# Patient Record
Sex: MALE | Race: WHITE | NOT HISPANIC OR LATINO | URBAN - METROPOLITAN AREA
[De-identification: names, ages, dates, MRNs, and addresses within clinical notes are randomized per-mention and may not be internally consistent; named-entity substitution may affect disease eponyms.]

---

## 2017-02-27 ENCOUNTER — FOLLOW UP ESTABLISHED (OUTPATIENT)
Dept: URBAN - METROPOLITAN AREA CLINIC 85 | Facility: CLINIC | Age: 77
End: 2017-02-27
Payer: MEDICARE

## 2017-02-27 DIAGNOSIS — H26.493 OTHER SECONDARY CATARACT, BILATERAL: ICD-10-CM

## 2017-02-27 DIAGNOSIS — E11.3293 DIABETES MELLITUS TYPE 2 WITH MILD NON-PROLIFERATI: Primary | ICD-10-CM

## 2017-02-27 DIAGNOSIS — Z79.4 LONG TERM (CURRENT) USE OF INSULIN: ICD-10-CM

## 2017-02-27 DIAGNOSIS — H35.372 PUCKERING OF MACULA, LEFT EYE: ICD-10-CM

## 2017-02-27 PROCEDURE — 92014 COMPRE OPH EXAM EST PT 1/>: CPT | Performed by: OPTOMETRIST

## 2017-02-27 ASSESSMENT — INTRAOCULAR PRESSURE
OS: 13
OD: 13

## 2017-02-27 ASSESSMENT — VISUAL ACUITY
OD: 20/25
OS: 20/40

## 2019-01-16 ENCOUNTER — APPOINTMENT (OUTPATIENT)
Dept: URGENT CARE | Facility: PHYSICIAN GROUP | Age: 79
End: 2019-01-16
Payer: MEDICARE

## 2019-01-17 ENCOUNTER — OFFICE VISIT (OUTPATIENT)
Dept: URGENT CARE | Facility: PHYSICIAN GROUP | Age: 79
End: 2019-01-17
Payer: MEDICARE

## 2019-01-17 VITALS
BODY MASS INDEX: 27.2 KG/M2 | WEIGHT: 190 LBS | HEIGHT: 70 IN | TEMPERATURE: 98.3 F | OXYGEN SATURATION: 98 % | HEART RATE: 96 BPM | SYSTOLIC BLOOD PRESSURE: 130 MMHG | RESPIRATION RATE: 16 BRPM | DIASTOLIC BLOOD PRESSURE: 80 MMHG

## 2019-01-17 DIAGNOSIS — B37.9 CANDIDA INFECTION: ICD-10-CM

## 2019-01-17 DIAGNOSIS — L30.4 INTERTRIGO: ICD-10-CM

## 2019-01-17 DIAGNOSIS — N48.1 BALANITIS: ICD-10-CM

## 2019-01-18 NOTE — PROGRESS NOTES
Willard through the physical exam patient decided that he would rather be seen in a different facility by a male doctor.  I did examine him enough to determine that he had balanitis and intertrigo.  Patient states that he wants to forego treatment from me and wait until he can see a male doctor.  Advised patient that if he should do that he should seek care sooner rather than later.

## 2019-03-20 ENCOUNTER — HOSPITAL ENCOUNTER (OUTPATIENT)
Facility: MEDICAL CENTER | Age: 79
End: 2019-03-21
Attending: EMERGENCY MEDICINE | Admitting: HOSPITALIST
Payer: MEDICARE

## 2019-03-20 DIAGNOSIS — T83.9XXA PROBLEM WITH FOLEY CATHETER, INITIAL ENCOUNTER (HCC): ICD-10-CM

## 2019-03-20 LAB
APPEARANCE UR: ABNORMAL
BACTERIA #/AREA URNS HPF: ABNORMAL /HPF
BASOPHILS # BLD AUTO: 0.8 % (ref 0–1.8)
BASOPHILS # BLD: 0.09 K/UL (ref 0–0.12)
BILIRUB UR QL STRIP.AUTO: NEGATIVE
COLOR UR: YELLOW
EOSINOPHIL # BLD AUTO: 0.3 K/UL (ref 0–0.51)
EOSINOPHIL NFR BLD: 2.7 % (ref 0–6.9)
EPI CELLS #/AREA URNS HPF: NEGATIVE /HPF
ERYTHROCYTE [DISTWIDTH] IN BLOOD BY AUTOMATED COUNT: 39.8 FL (ref 35.9–50)
GLUCOSE UR STRIP.AUTO-MCNC: >=1000 MG/DL
HCT VFR BLD AUTO: 42.8 % (ref 42–52)
HGB BLD-MCNC: 14.3 G/DL (ref 14–18)
HYALINE CASTS #/AREA URNS LPF: ABNORMAL /LPF
IMM GRANULOCYTES # BLD AUTO: 0.07 K/UL (ref 0–0.11)
IMM GRANULOCYTES NFR BLD AUTO: 0.6 % (ref 0–0.9)
INR PPP: 1.07 (ref 0.87–1.13)
KETONES UR STRIP.AUTO-MCNC: 15 MG/DL
LEUKOCYTE ESTERASE UR QL STRIP.AUTO: ABNORMAL
LYMPHOCYTES # BLD AUTO: 1.52 K/UL (ref 1–4.8)
LYMPHOCYTES NFR BLD: 13.6 % (ref 22–41)
MCH RBC QN AUTO: 29.8 PG (ref 27–33)
MCHC RBC AUTO-ENTMCNC: 33.4 G/DL (ref 33.7–35.3)
MCV RBC AUTO: 89.2 FL (ref 81.4–97.8)
MICRO URNS: ABNORMAL
MONOCYTES # BLD AUTO: 0.72 K/UL (ref 0–0.85)
MONOCYTES NFR BLD AUTO: 6.5 % (ref 0–13.4)
NEUTROPHILS # BLD AUTO: 8.44 K/UL (ref 1.82–7.42)
NEUTROPHILS NFR BLD: 75.8 % (ref 44–72)
NITRITE UR QL STRIP.AUTO: NEGATIVE
NRBC # BLD AUTO: 0 K/UL
NRBC BLD-RTO: 0 /100 WBC
PH UR STRIP.AUTO: 5.5 [PH]
PLATELET # BLD AUTO: 269 K/UL (ref 164–446)
PMV BLD AUTO: 10.9 FL (ref 9–12.9)
PROT UR QL STRIP: 30 MG/DL
PROTHROMBIN TIME: 14 SEC (ref 12–14.6)
RBC # BLD AUTO: 4.8 M/UL (ref 4.7–6.1)
RBC # URNS HPF: >150 /HPF
RBC UR QL AUTO: ABNORMAL
SP GR UR STRIP.AUTO: 1.03
UROBILINOGEN UR STRIP.AUTO-MCNC: 0.2 MG/DL
WBC # BLD AUTO: 11.1 K/UL (ref 4.8–10.8)
WBC #/AREA URNS HPF: ABNORMAL /HPF

## 2019-03-20 PROCEDURE — 85610 PROTHROMBIN TIME: CPT

## 2019-03-20 PROCEDURE — 700105 HCHG RX REV CODE 258: Performed by: EMERGENCY MEDICINE

## 2019-03-20 PROCEDURE — 96372 THER/PROPH/DIAG INJ SC/IM: CPT | Mod: XU

## 2019-03-20 PROCEDURE — 87186 SC STD MICRODIL/AGAR DIL: CPT

## 2019-03-20 PROCEDURE — 303105 HCHG CATHETER EXTRA

## 2019-03-20 PROCEDURE — 87077 CULTURE AEROBIC IDENTIFY: CPT

## 2019-03-20 PROCEDURE — 87086 URINE CULTURE/COLONY COUNT: CPT

## 2019-03-20 PROCEDURE — 99285 EMERGENCY DEPT VISIT HI MDM: CPT

## 2019-03-20 PROCEDURE — 81001 URINALYSIS AUTO W/SCOPE: CPT

## 2019-03-20 PROCEDURE — 80053 COMPREHEN METABOLIC PANEL: CPT

## 2019-03-20 PROCEDURE — 51702 INSERT TEMP BLADDER CATH: CPT

## 2019-03-20 PROCEDURE — 302140 GU CART: Performed by: EMERGENCY MEDICINE

## 2019-03-20 PROCEDURE — 85025 COMPLETE CBC W/AUTO DIFF WBC: CPT

## 2019-03-20 PROCEDURE — 84153 ASSAY OF PSA TOTAL: CPT

## 2019-03-20 RX ORDER — SODIUM CHLORIDE 9 MG/ML
1000 INJECTION, SOLUTION INTRAVENOUS ONCE
Status: COMPLETED | OUTPATIENT
Start: 2019-03-20 | End: 2019-03-21

## 2019-03-20 RX ADMIN — SODIUM CHLORIDE 1000 ML: 9 INJECTION, SOLUTION INTRAVENOUS at 23:20

## 2019-03-21 ENCOUNTER — PATIENT OUTREACH (OUTPATIENT)
Dept: HEALTH INFORMATION MANAGEMENT | Facility: OTHER | Age: 79
End: 2019-03-21

## 2019-03-21 VITALS
WEIGHT: 175.27 LBS | BODY MASS INDEX: 25.09 KG/M2 | SYSTOLIC BLOOD PRESSURE: 167 MMHG | DIASTOLIC BLOOD PRESSURE: 86 MMHG | OXYGEN SATURATION: 95 % | HEIGHT: 70 IN | TEMPERATURE: 98.6 F | RESPIRATION RATE: 16 BRPM | HEART RATE: 88 BPM

## 2019-03-21 PROBLEM — R33.9 URINARY RETENTION: Status: ACTIVE | Noted: 2019-03-21

## 2019-03-21 PROBLEM — N40.0 BPH (BENIGN PROSTATIC HYPERPLASIA): Status: ACTIVE | Noted: 2019-03-21

## 2019-03-21 PROBLEM — E11.65 TYPE 2 DIABETES MELLITUS WITH HYPERGLYCEMIA, WITH LONG-TERM CURRENT USE OF INSULIN (HCC): Status: ACTIVE | Noted: 2019-03-21

## 2019-03-21 PROBLEM — Z79.4 TYPE 2 DIABETES MELLITUS WITH HYPERGLYCEMIA, WITH LONG-TERM CURRENT USE OF INSULIN (HCC): Status: ACTIVE | Noted: 2019-03-21

## 2019-03-21 LAB
ALBUMIN SERPL BCP-MCNC: 4.3 G/DL (ref 3.2–4.9)
ALBUMIN/GLOB SERPL: 1.5 G/DL
ALP SERPL-CCNC: 79 U/L (ref 30–99)
ALT SERPL-CCNC: 9 U/L (ref 2–50)
ANION GAP SERPL CALC-SCNC: 14 MMOL/L (ref 0–11.9)
AST SERPL-CCNC: 17 U/L (ref 12–45)
BILIRUB SERPL-MCNC: 0.7 MG/DL (ref 0.1–1.5)
BUN SERPL-MCNC: 15 MG/DL (ref 8–22)
CALCIUM SERPL-MCNC: 9.5 MG/DL (ref 8.5–10.5)
CHLORIDE SERPL-SCNC: 99 MMOL/L (ref 96–112)
CO2 SERPL-SCNC: 20 MMOL/L (ref 20–33)
CREAT SERPL-MCNC: 0.86 MG/DL (ref 0.5–1.4)
GLOBULIN SER CALC-MCNC: 2.8 G/DL (ref 1.9–3.5)
GLUCOSE BLD-MCNC: 216 MG/DL (ref 65–99)
GLUCOSE BLD-MCNC: 316 MG/DL (ref 65–99)
GLUCOSE BLD-MCNC: 348 MG/DL (ref 65–99)
GLUCOSE SERPL-MCNC: 395 MG/DL (ref 65–99)
POTASSIUM SERPL-SCNC: 4.1 MMOL/L (ref 3.6–5.5)
PROT SERPL-MCNC: 7.1 G/DL (ref 6–8.2)
PSA SERPL-MCNC: 23.93 NG/ML (ref 0–4)
SODIUM SERPL-SCNC: 133 MMOL/L (ref 135–145)

## 2019-03-21 PROCEDURE — 306015 LOCK STAT FOLEY: Performed by: HOSPITALIST

## 2019-03-21 PROCEDURE — 700105 HCHG RX REV CODE 258: Performed by: EMERGENCY MEDICINE

## 2019-03-21 PROCEDURE — G0378 HOSPITAL OBSERVATION PER HR: HCPCS

## 2019-03-21 PROCEDURE — 82962 GLUCOSE BLOOD TEST: CPT

## 2019-03-21 PROCEDURE — 700105 HCHG RX REV CODE 258: Performed by: HOSPITALIST

## 2019-03-21 PROCEDURE — 99235 HOSP IP/OBS SAME DATE MOD 70: CPT | Performed by: FAMILY MEDICINE

## 2019-03-21 PROCEDURE — 96365 THER/PROPH/DIAG IV INF INIT: CPT

## 2019-03-21 PROCEDURE — A9270 NON-COVERED ITEM OR SERVICE: HCPCS | Performed by: EMERGENCY MEDICINE

## 2019-03-21 PROCEDURE — 96372 THER/PROPH/DIAG INJ SC/IM: CPT

## 2019-03-21 PROCEDURE — 700102 HCHG RX REV CODE 250 W/ 637 OVERRIDE(OP): Performed by: HOSPITALIST

## 2019-03-21 PROCEDURE — 96372 THER/PROPH/DIAG INJ SC/IM: CPT | Mod: XU

## 2019-03-21 PROCEDURE — 700111 HCHG RX REV CODE 636 W/ 250 OVERRIDE (IP): Performed by: EMERGENCY MEDICINE

## 2019-03-21 PROCEDURE — 700102 HCHG RX REV CODE 250 W/ 637 OVERRIDE(OP): Performed by: EMERGENCY MEDICINE

## 2019-03-21 RX ORDER — OXYCODONE HYDROCHLORIDE 5 MG/1
2.5 TABLET ORAL
Status: DISCONTINUED | OUTPATIENT
Start: 2019-03-21 | End: 2019-03-21 | Stop reason: HOSPADM

## 2019-03-21 RX ORDER — AMOXICILLIN 250 MG
2 CAPSULE ORAL 2 TIMES DAILY
Status: DISCONTINUED | OUTPATIENT
Start: 2019-03-21 | End: 2019-03-21 | Stop reason: HOSPADM

## 2019-03-21 RX ORDER — OXYBUTYNIN CHLORIDE 10 MG/1
10 TABLET, EXTENDED RELEASE ORAL EVERY EVENING
Status: DISCONTINUED | OUTPATIENT
Start: 2019-03-21 | End: 2019-03-21 | Stop reason: HOSPADM

## 2019-03-21 RX ORDER — DEXTROSE MONOHYDRATE 25 G/50ML
25 INJECTION, SOLUTION INTRAVENOUS
Status: DISCONTINUED | OUTPATIENT
Start: 2019-03-21 | End: 2019-03-21 | Stop reason: HOSPADM

## 2019-03-21 RX ORDER — OXYCODONE HYDROCHLORIDE 5 MG/1
5 TABLET ORAL
Status: DISCONTINUED | OUTPATIENT
Start: 2019-03-21 | End: 2019-03-21 | Stop reason: HOSPADM

## 2019-03-21 RX ORDER — OXYCODONE AND ACETAMINOPHEN 10; 325 MG/1; MG/1
1 TABLET ORAL ONCE
Status: COMPLETED | OUTPATIENT
Start: 2019-03-21 | End: 2019-03-21

## 2019-03-21 RX ORDER — BISACODYL 10 MG
10 SUPPOSITORY, RECTAL RECTAL
Status: DISCONTINUED | OUTPATIENT
Start: 2019-03-21 | End: 2019-03-21 | Stop reason: HOSPADM

## 2019-03-21 RX ORDER — OXYBUTYNIN CHLORIDE 10 MG/1
10 TABLET, EXTENDED RELEASE ORAL EVERY EVENING
Qty: 30 TAB | Refills: 1 | Status: ON HOLD | OUTPATIENT
Start: 2019-03-21 | End: 2019-04-09

## 2019-03-21 RX ORDER — ONDANSETRON 2 MG/ML
4 INJECTION INTRAMUSCULAR; INTRAVENOUS EVERY 4 HOURS PRN
Status: DISCONTINUED | OUTPATIENT
Start: 2019-03-21 | End: 2019-03-21 | Stop reason: HOSPADM

## 2019-03-21 RX ORDER — POLYETHYLENE GLYCOL 3350 17 G/17G
1 POWDER, FOR SOLUTION ORAL
Status: DISCONTINUED | OUTPATIENT
Start: 2019-03-21 | End: 2019-03-21 | Stop reason: HOSPADM

## 2019-03-21 RX ORDER — ONDANSETRON 4 MG/1
4 TABLET, ORALLY DISINTEGRATING ORAL EVERY 4 HOURS PRN
Status: DISCONTINUED | OUTPATIENT
Start: 2019-03-21 | End: 2019-03-21 | Stop reason: HOSPADM

## 2019-03-21 RX ORDER — ACETAMINOPHEN 325 MG/1
650 TABLET ORAL EVERY 6 HOURS PRN
Status: DISCONTINUED | OUTPATIENT
Start: 2019-03-21 | End: 2019-03-21 | Stop reason: HOSPADM

## 2019-03-21 RX ORDER — MORPHINE SULFATE 4 MG/ML
2 INJECTION, SOLUTION INTRAMUSCULAR; INTRAVENOUS
Status: DISCONTINUED | OUTPATIENT
Start: 2019-03-21 | End: 2019-03-21 | Stop reason: HOSPADM

## 2019-03-21 RX ORDER — SODIUM CHLORIDE 9 MG/ML
INJECTION, SOLUTION INTRAVENOUS CONTINUOUS
Status: DISCONTINUED | OUTPATIENT
Start: 2019-03-21 | End: 2019-03-21

## 2019-03-21 RX ADMIN — INSULIN HUMAN 4 UNITS: 100 INJECTION, SOLUTION PARENTERAL at 02:28

## 2019-03-21 RX ADMIN — SODIUM CHLORIDE: 9 INJECTION, SOLUTION INTRAVENOUS at 03:00

## 2019-03-21 RX ADMIN — INSULIN HUMAN 2 UNITS: 100 INJECTION, SOLUTION PARENTERAL at 06:32

## 2019-03-21 RX ADMIN — INSULIN HUMAN 4 UNITS: 100 INJECTION, SOLUTION PARENTERAL at 13:44

## 2019-03-21 RX ADMIN — CEFTRIAXONE 2 G: 2 INJECTION, POWDER, FOR SOLUTION INTRAMUSCULAR; INTRAVENOUS at 00:25

## 2019-03-21 RX ADMIN — OXYCODONE HYDROCHLORIDE AND ACETAMINOPHEN 1 TABLET: 10; 325 TABLET ORAL at 02:25

## 2019-03-21 ASSESSMENT — ENCOUNTER SYMPTOMS
PSYCHIATRIC NEGATIVE: 1
ABDOMINAL PAIN: 1
RESPIRATORY NEGATIVE: 1
EYES NEGATIVE: 1
CARDIOVASCULAR NEGATIVE: 1
NEUROLOGICAL NEGATIVE: 1
CONSTITUTIONAL NEGATIVE: 1
MUSCULOSKELETAL NEGATIVE: 1

## 2019-03-21 ASSESSMENT — PATIENT HEALTH QUESTIONNAIRE - PHQ9
1. LITTLE INTEREST OR PLEASURE IN DOING THINGS: NOT AT ALL
SUM OF ALL RESPONSES TO PHQ9 QUESTIONS 1 AND 2: 0
SUM OF ALL RESPONSES TO PHQ9 QUESTIONS 1 AND 2: 0
1. LITTLE INTEREST OR PLEASURE IN DOING THINGS: NOT AT ALL
2. FEELING DOWN, DEPRESSED, IRRITABLE, OR HOPELESS: NOT AT ALL
2. FEELING DOWN, DEPRESSED, IRRITABLE, OR HOPELESS: NOT AT ALL

## 2019-03-21 ASSESSMENT — LIFESTYLE VARIABLES
EVER_SMOKED: YES
ALCOHOL_USE: NO
DO YOU DRINK ALCOHOL: NO

## 2019-03-21 NOTE — PROGRESS NOTES
Seen pt, AOx 4. Denies any pain. Callejas flowing fine, no blood/clots seen. Fluids on NS at 83 ml/hr. Plan of care discussed includes Safety, pain management, fluids, NPO for possible Sx/procedure? Awaits urology consult and pt understands.

## 2019-03-21 NOTE — PROGRESS NOTES
Pt returned stating he does not have the funds to pay for taxi. House  called to help arrange ride home   Awaiting call back

## 2019-03-21 NOTE — DISCHARGE PLANNING
Received request that patient wanted to speak with CM. Spoke with patient at bedside. Patient request to be discharged and update patient that CM is not incharge of D/C's. Patient then states he has no ride home. Call placed to Gopal @ [phone] to see if [employer] or Transport can go to [place] Awaiting call back.

## 2019-03-21 NOTE — ED NOTES
Med rec updated and complete.  Allergies reviewed. Pt should be using insulin  But ran out approx 1 month ago.  Pt unsure of what kind it was.  Pt denies oral antibiotic use in last 30 days at home.

## 2019-03-21 NOTE — CARE PLAN
Problem: Discharge Barriers/Planning  Goal: Patient's continuum of care needs will be met  Outcome: PROGRESSING AS EXPECTED  Admitted for urinary catheter issues/blockage. Will be on observation for fluids, pain control and possible Sx/procedure. Awaits for urology consult.

## 2019-03-21 NOTE — DISCHARGE PLANNING
Care Transition Team Assessment    Spoke with patient at bedside. Anticipate no needs @ present time. Lives with son who is a long haul . Uncertain of ride home @ present time.    Information Source  Orientation : Oriented x 4  Information Given By: Patient    Readmission Evaluation  Is this a readmission?: No    Interdisciplinary Discharge Planning  Does Admitting Nurse Feel This Could be a Complex Discharge?: No  Primary Care Physician: Banner melanie Montalvo  Lives with - Patient's Self Care Capacity: Adult Children  Patient or legal guardian wants to designate a caregiver (see row info): No  Support Systems: Children  Housing / Facility: [address]  Do You Take your Prescribed Medications Regularly: Yes  Able to Return to Previous ADL's: Yes  Mobility Issues: No  Prior Services: Home-Independent  Patient Expects to be Discharged to:: Home  Assistance Needed: No  Durable Medical Equipment: Not Applicable    Discharge Preparedness  What are your discharge supports?: Child  Prior Functional Level: Ambulatory    Functional Assesment  Prior Functional Level: Ambulatory    Finances  Prescription Coverage: Yes    Anticipated Discharge Information  Anticipated discharge disposition: Home  Discharge [facility] [address] [place] [address]  Discharge Contact Phone Number: [phone]

## 2019-03-21 NOTE — CONSULTS
UROLOGY Consult Note:    Boyd Freedman  Date & Time note created:    3/21/2019   12:26 PM     Referring MD:  Dr. Brandon    Patient ID:   Name:             Alec Howell     YOB: 1940  Age:                 78 y.o.  male   MRN:               [id number]                                                             Reason for Consult:       Urinary retention with an indwelling Callejas catheter that is been quite problematic for the patient.    History of Present Illness:    This is a 78-year-old man who went into urinary in early March.  He has been treated with an indwelling Callejas catheter.  He has had problems with hematuria and catheter drainage issues that have caused him to come to the emergency room on 3-4 separate occasions.    Review of Systems:      Constitutional: Denies fevers, Denies weight changes  Eyes: Denies changes in vision, no eye pain  Ears/Nose/Throat/Mouth: Denies nasal congestion or sore throat   Cardiovascular: no chest pain, no palpitations   Respiratory: no shortness of breath , Denies cough  Gastrointestinal/Hepatic: Denies abdominal pain, nausea, vomiting, diarrhea, constipation or GI bleeding   Genitourinary: Bladder spasms and hematuria, no dysuria or frequency  Musculoskeletal/Rheum: Denies  joint pain and swelling, no edema  Skin: Denies rash  Neurological: Denies headache, confusion, memory loss or focal weakness/parasthesias  Psychiatric: denies mood disorder   Endocrine: Tereza thyroid problems  Heme/Oncology/Lymph Nodes: Denies enlarged lymph nodes, denies brusing or known bleeding disorder  All other systems were reviewed and are negative (AMA/CMS criteria)                Past Medical History:   Past Medical History:   Diagnosis Date   • Diabetes (HCC)    • Hypertension      Active Hospital Problems    Diagnosis   • Type 2 diabetes mellitus with hyperglycemia, with long-term current use of insulin (HCC) [E11.65, Z79.4]   • BPH (benign prostatic hyperplasia) [N40.0]   •  Urinary retention [R33.9]       Past Surgical History:  Past Surgical History:   Procedure Laterality Date   • OTHER CARDIAC SURGERY      Cardiac stent x1       Hospital Medications:    Current Facility-Administered Medications:   •  acetaminophen (TYLENOL) tablet 650 mg, 650 mg, Oral, Q6HRS PRN, Hill Brandon M.D.  •  Notify provider if pain remains uncontrolled, , , CONTINUOUS **AND** Use the numeric rating scale (NRS-11) on regular floors and Critical-Care Pain Observation Tool (CPOT) on ICUs/Trauma to assess pain, , , CONTINUOUS **AND** Pulse Ox (Oximetry), , , CONTINUOUS **AND** Pharmacy Consult Request ...Pain Management Review 1 Each, 1 Each, Other, PHARMACY TO DOSE **AND** If patient difficult to arouse and/or has respiratory depression, stop any opiates that are currently infusing and call a Rapid Response., , , CONTINUOUS **AND** oxyCODONE immediate-release (ROXICODONE) tablet 2.5 mg, 2.5 mg, Oral, Q3HRS PRN **AND** oxyCODONE immediate-release (ROXICODONE) tablet 5 mg, 5 mg, Oral, Q3HRS PRN **AND** morphine (pf) 4 mg/ml injection 2 mg, 2 mg, Intravenous, Q3HRS PRN, Hill Brandon M.D.  •  ondansetron (ZOFRAN) syringe/vial injection 4 mg, 4 mg, Intravenous, Q4HRS PRN, Hill Brandon M.D.  •  ondansetron (ZOFRAN ODT) dispertab 4 mg, 4 mg, Oral, Q4HRS PRN, Hill Brandon M.D.  •  senna-docusate (PERICOLACE or SENOKOT S) 8.6-50 MG per tablet 2 Tab, 2 Tab, Oral, BID **AND** polyethylene glycol/lytes (MIRALAX) PACKET 1 Packet, 1 Packet, Oral, QDAY PRN **AND** magnesium hydroxide (MILK OF MAGNESIA) suspension 30 mL, 30 mL, Oral, QDAY PRN **AND** bisacodyl (DULCOLAX) suppository 10 mg, 10 mg, Rectal, QDAY PRN, Hill Brandon M.D.  •  insulin regular (HUMULIN R) injection 1-6 Units, 1-6 Units, Subcutaneous, Q6HRS, 2 Units at 03/21/19 0632 **AND** Accu-Chek Q6 if NPO, , , Q6H **AND** NOTIFY MD and PharmD, , , Once **AND** glucose 4 g chewable tablet 16 g, 16 g, Oral, Q15 MIN PRN **AND** dextrose 50% (D50W)  "injection 25 mL, 25 mL, Intravenous, Q15 MIN LIBRAN, Hill Brandon M.D.    Current Outpatient Medications:  Prescriptions Prior to Admission   Medication Sig Dispense Refill Last Dose   • SITagliptin (JANUVIA) 100 MG Tab Take 100 mg by mouth every day.   3/20/2019 at 0830       Medication Allergy:  No Known Allergies    Family History:  Family History   Problem Relation Age of Onset   • Alzheimer's Disease Mother    • No Known Problems Father        Social History:  Social History     Social History   • Marital status: Single     Spouse name: N/A   • Number of children: N/A   • Years of education: N/A     Occupational History   • Not on file.     Social History Main Topics   • Smoking status: Current Some Day Smoker     Types: Cigars   • Smokeless tobacco: Never Used   • Alcohol use No   • Drug use: No   • Sexual activity: Not on file     Other Topics Concern   • Not on file     Social History Narrative   • No narrative on file         Physical Exam:  Vitals/ General Appearance:   Weight/BMI: Body mass index is 25.15 kg/m².  Blood pressure 149/76, pulse 79, temperature 36.7 °C (98 °F), temperature source Temporal, resp. rate 16, height 1.778 m (5' 10\"), weight 79.5 kg (175 lb 4.3 oz), SpO2 97 %.  Vitals:    03/21/19 0000 03/21/19 0045 03/21/19 0241 03/21/19 0433   BP:   (!) 162/88 149/76   Pulse: (!) 107 97 91 79   Resp:   16 16   Temp:   37.2 °C (98.9 °F) 36.7 °C (98 °F)   TempSrc:   Temporal Temporal   SpO2: 98% 95% 97% 97%   Weight:   79.5 kg (175 lb 4.3 oz)    Height:   1.778 m (5' 10\")      Oxygen Therapy:  Pulse Oximetry: 97 %, O2 (LPM): 0, O2 Delivery: None (Room Air)    Constitutional:   Well developed, Well nourished, No acute distress  HENMT:  Normocephalic, Atraumatic, Oropharynx moist mucous membranes, No oral exudates, Nose normal.  No thyromegaly.  Eyes:  EOMI, Conjunctiva normal, No discharge.  Neck:  Normal range of motion, No cervical tenderness,  no JVD.  Cardiovascular:  Normal heart rate, Normal " rhythm, No murmurs, No rubs, No gallops.   Extremitites with intact distal pulses, no cyanosis, or edema.  Lungs:  Normal breath sounds, breath sounds clear to auscultation bilaterally,  no crackles, no wheezing.   Abdomen: Bowel sounds normal, Soft, No tenderness, No guarding, No rebound, No masses, No hepatosplenomegaly.  : Normal external genitalia without lesion.  Callejas catheter in place draining clear yellow urine.  Skin: Warm, Dry, No erythema, No rash, no induration.  Neurologic: Alert & oriented x 3, No focal deficits noted, cranial nerves II through X are grossly intact.  Psychiatric: Affect normal, Judgment normal, Mood normal.      MDM (Data Review):     Records reviewed and summarized in current documentation    Lab Data Review:  Recent Results (from the past 24 hour(s))   CBC WITH DIFFERENTIAL    Collection Time: 03/20/19 10:22 PM   Result Value Ref Range    WBC 11.1 (H) 4.8 - 10.8 K/uL    RBC 4.80 4.70 - 6.10 M/uL    Hemoglobin 14.3 14.0 - 18.0 g/dL    Hematocrit 42.8 42.0 - 52.0 %    MCV 89.2 81.4 - 97.8 fL    MCH 29.8 27.0 - 33.0 pg    MCHC 33.4 (L) 33.7 - 35.3 g/dL    RDW 39.8 35.9 - 50.0 fL    Platelet Count 269 164 - 446 K/uL    MPV 10.9 9.0 - 12.9 fL    Neutrophils-Polys 75.80 (H) 44.00 - 72.00 %    Lymphocytes 13.60 (L) 22.00 - 41.00 %    Monocytes 6.50 0.00 - 13.40 %    Eosinophils 2.70 0.00 - 6.90 %    Basophils 0.80 0.00 - 1.80 %    Immature Granulocytes 0.60 0.00 - 0.90 %    Nucleated RBC 0.00 /100 WBC    Neutrophils (Absolute) 8.44 (H) 1.82 - 7.42 K/uL    Lymphs (Absolute) 1.52 1.00 - 4.80 K/uL    Monos (Absolute) 0.72 0.00 - 0.85 K/uL    Eos (Absolute) 0.30 0.00 - 0.51 K/uL    Baso (Absolute) 0.09 0.00 - 0.12 K/uL    Immature Granulocytes (abs) 0.07 0.00 - 0.11 K/uL    NRBC (Absolute) 0.00 K/uL   COMP METABOLIC PANEL    Collection Time: 03/20/19 10:22 PM   Result Value Ref Range    Sodium 133 (L) 135 - 145 mmol/L    Potassium 4.1 3.6 - 5.5 mmol/L    Chloride 99 96 - 112 mmol/L    Co2 20  20 - 33 mmol/L    Anion Gap 14.0 (H) 0.0 - 11.9    Glucose 395 (H) 65 - 99 mg/dL    Bun 15 8 - 22 mg/dL    Creatinine 0.86 0.50 - 1.40 mg/dL    Calcium 9.5 8.5 - 10.5 mg/dL    AST(SGOT) 17 12 - 45 U/L    ALT(SGPT) 9 2 - 50 U/L    Alkaline Phosphatase 79 30 - 99 U/L    Total Bilirubin 0.7 0.1 - 1.5 mg/dL    Albumin 4.3 3.2 - 4.9 g/dL    Total Protein 7.1 6.0 - 8.2 g/dL    Globulin 2.8 1.9 - 3.5 g/dL    A-G Ratio 1.5 g/dL   PROTHROMBIN TIME (INR)    Collection Time: 03/20/19 10:22 PM   Result Value Ref Range    PT 14.0 12.0 - 14.6 sec    INR 1.07 0.87 - 1.13   ESTIMATED GFR    Collection Time: 03/20/19 10:22 PM   Result Value Ref Range    GFR If African American >60 >60 mL/min/1.73 m 2    GFR If Non African American >60 >60 mL/min/1.73 m 2   URINALYSIS,CULTURE IF INDICATED    Collection Time: 03/20/19 11:19 PM   Result Value Ref Range    Color Yellow     Character Cloudy (A)     Specific Gravity 1.028 <1.035    Ph 5.5 5.0 - 8.0    Glucose >=1000 (A) Negative mg/dL    Ketones 15 (A) Negative mg/dL    Protein 30 (A) Negative mg/dL    Bilirubin Negative Negative    Urobilinogen, Urine 0.2 Negative    Nitrite Negative Negative    Leukocyte Esterase Small (A) Negative    Occult Blood Large (A) Negative    Micro Urine Req Microscopic    URINE MICROSCOPIC (W/UA)    Collection Time: 03/20/19 11:19 PM   Result Value Ref Range    WBC  (A) /hpf    RBC >150 (A) /hpf    Bacteria Few (A) None /hpf    Epithelial Cells Negative /hpf    Hyaline Cast 3-5 (A) /lpf   ACCU-CHEK GLUCOSE    Collection Time: 03/21/19  2:21 AM   Result Value Ref Range    Glucose - Accu-Ck 316 (H) 65 - 99 mg/dL   ACCU-CHEK GLUCOSE    Collection Time: 03/21/19  6:31 AM   Result Value Ref Range    Glucose - Accu-Ck 216 (H) 65 - 99 mg/dL       Imaging/Procedures Review:    Reviewed    MDM (Assessment and Plan):     Active Hospital Problems    Diagnosis   • Type 2 diabetes mellitus with hyperglycemia, with long-term current use of insulin (HCC) [E11.65,  Z79.4]   • BPH (benign prostatic hyperplasia) [N40.0]   • Urinary retention [R33.9]     I will try to move this gentlemen's TURP surgery up to next week.  If the surgery can be moved up he should have his catheter removed and start intermittent catheterization and start p.o. antibiotics prior to TURP surgery.  I will order a PSA.

## 2019-03-21 NOTE — ED NOTES
Patient's de león that was in place on arrival removed by Dr. Delgadillo. Patient able to urinate 200 mL of blood urine at this time.

## 2019-03-21 NOTE — ED TRIAGE NOTES
"Chief Complaint   Patient presents with   • Urinary Catheter Problem     Patient had a de león placed at [facility] 2 days ago for bloody urine. Patient has noticed continued bloody urine with clots. Patient is having pain with urine leaking around the catheter.      Patient is supposed to have surgery with a urologist 4/28. Patient has been having issues with his catheters becoming clogged and has had multiple catheters placed.    Blood pressure (!) 164/90, pulse 97, temperature 36.7 °C (98 °F), temperature source Temporal, resp. rate 18, height 1.778 m (5' 10\"), weight 78 kg (172 lb), SpO2 97 %.    "

## 2019-03-21 NOTE — H&P
Hospital Medicine History & Physical Note    Date of Service  3/21/2019    Primary Care Physician  Pcp Pt States None    Consultants  Urology     Code Status  Full code     Chief Complaint  Urine retention    History of Presenting Illness  78 y.o. male with history of diabetes mellitus on regimen with hyperglycemia, BPH awaiting TURP with in-situ de león catheter and reported essential hypertension controlled with unclear medication regimen was in his usual state of health until the day prior to admission, when he noted pelvic pain, and awoke to find large clots in his de león leg bag.  He reports this to be a recurrent issue.  He has presented to the emergency department for the same in the past.  He reports no exacerbating or alleviating factors.  No other complaints.      Review of Systems  Review of Systems   Constitutional: Negative.    HENT: Negative.    Eyes: Negative.    Respiratory: Negative.    Cardiovascular: Negative.    Gastrointestinal: Positive for abdominal pain.   Genitourinary: Positive for dysuria.   Musculoskeletal: Negative.    Skin: Negative.    Neurological: Negative.    Endo/Heme/Allergies: Negative.    Psychiatric/Behavioral: Negative.        Past Medical History   has a past medical history of Diabetes (HCC) and Hypertension.    Surgical History   has a past surgical history that includes other cardiac surgery.     Family History  family history includes Alzheimer's Disease in his mother; No Known Problems in his father.     Social History   reports that he has been smoking Cigars.  He has never used smokeless tobacco. He reports that he does not drink alcohol or use drugs.    Allergies  No Known Allergies    Medications  Prior to Admission Medications   Prescriptions Last Dose Informant Patient Reported? Taking?   SITagliptin (JANUVIA) 100 MG Tab 3/20/2019 at 0830  Yes No   Sig: Take 100 mg by mouth every day.      Facility-Administered Medications: None       Physical Exam  Temp:  [36.7 °C  (98 °F)] 36.7 °C (98 °F)  Pulse:  [] 95  Resp:  [18] 18  BP: (164)/(90) 164/90  SpO2:  [97 %-98 %] 97 %    Physical Exam   Constitutional: He is oriented to person, place, and time. He appears well-developed and well-nourished. No distress.   HENT:   Head: Normocephalic and atraumatic.   Eyes: Pupils are equal, round, and reactive to light. Conjunctivae are normal.   Neck: Normal range of motion. Neck supple. No tracheal deviation present. No thyromegaly present.   Cardiovascular: Normal rate, regular rhythm and normal heart sounds.  Exam reveals no gallop and no friction rub.    No murmur heard.  Pulmonary/Chest: Effort normal and breath sounds normal. No respiratory distress. He has no wheezes.   Abdominal: Soft. Bowel sounds are normal. He exhibits no distension and no mass. There is no tenderness. There is no rebound and no guarding.   Musculoskeletal: Normal range of motion. He exhibits no edema.   Lymphadenopathy:     He has no cervical adenopathy.   Neurological: He is alert and oriented to person, place, and time. No cranial nerve deficit.   Skin: Skin is warm and dry. He is not diaphoretic.   Psychiatric: He has a normal mood and affect.   Nursing note and vitals reviewed.      Laboratory:  Recent Labs      03/20/19 2222   WBC  11.1*   RBC  4.80   HEMOGLOBIN  14.3   HEMATOCRIT  42.8   MCV  89.2   MCH  29.8   MCHC  33.4*   RDW  39.8   PLATELETCT  269   MPV  10.9     Recent Labs      03/20/19   2222   SODIUM  133*   POTASSIUM  4.1   CHLORIDE  99   CO2  20   GLUCOSE  395*   BUN  15   CREATININE  0.86   CALCIUM  9.5     Recent Labs      03/20/19   2222   ALTSGPT  9   ASTSGOT  17   ALKPHOSPHAT  79   TBILIRUBIN  0.7   GLUCOSE  395*     Recent Labs      03/20/19   2222   INR  1.07             No results for input(s): TROPONINI in the last 72 hours.    Urinalysis:    Recent Labs      03/20/19   2319   SPECGRAVITY  1.028   GLUCOSEUR  >=1000*   KETONES  15*   NITRITE  Negative   LEUKESTERAS  Small*   WBCURINE   *   RBCURINE  >150*   BACTERIA  Few*   EPITHELCELL  Negative        Imaging:  No orders to display         Assessment/Plan:  I anticipate this patient is appropriate for observation status at this time.    * BPH (benign prostatic hyperplasia)   Assessment & Plan    Oupatient plans for TURP with Dr. Solano noted in chart.  Will consult urology in AM given recurrent episodes of hematuria/clotting requiring ED visits.  May need additional education regarding flushing catheter at home. Continue current de león catheter.       Urinary retention   Assessment & Plan    Recurrent, de león catheter in place chronically, but with recurrent visits for hematuria, clotting of flow.       Type 2 diabetes mellitus with hyperglycemia, with long-term current use of insulin (HCC)   Assessment & Plan    Questionable compliance with medication regimen, as he states that he is taking insulin but is unaware of the exact dose. Will monitor with correction dose insulin therapy for now.  Will be NPO in the event that urology wants to do further procedure at this time.          VTE prophylaxis: SCD

## 2019-03-21 NOTE — ASSESSMENT & PLAN NOTE
Recurrent, de león catheter in place chronically, but with recurrent visits for hematuria, clotting of flow.

## 2019-03-21 NOTE — ASSESSMENT & PLAN NOTE
Questionable compliance with medication regimen, as he states that he is taking insulin but is unaware of the exact dose. Will monitor with correction dose insulin therapy for now.  Will be NPO in the event that urology wants to do further procedure at this time.

## 2019-03-21 NOTE — DISCHARGE SUMMARY
Discharge Summary    CHIEF COMPLAINT ON ADMISSION  Chief Complaint   Patient presents with   • Urinary Catheter Problem     Patient had a de león placed at [facility] 2 days ago for bloody urine. Patient has noticed continued bloody urine with clots. Patient is having pain with urine leaking around the catheter.        Reason for Admission  EMS     Admission Date  3/20/2019    CODE STATUS  Full Code    HPI & HOSPITAL COURSE  This is a 78 y.o. male with a past medical history of BPH requiring chronic De León catheter here with complaints of pelvic pain and clots being present in his De León bag.  On admission the patient's De León catheter was exchanged and a larger size De León catheter was placed.  The patient is expected to have an outpatient TURP with Dr. Solano next month although the patient is requesting to have the procedure sooner as he is having ongoing issues with his De León catheter requiring multiple admissions.  He was evaluated by Dr. Freedman while in the hospital.  His pain is suspected to be secondary to bladder spasms and the patient was initiated on Ditropan.  His bladder spasms are likely causing intermittent obstruction of his catheter.  At this time the patient's De León catheter is draining appropriately.  He has no evidence of hematuria.  His pain is currently improved.  Dr. Freedman will attempt to move up the patient's surgery date.  The patient is expected to follow as an outpatient for confirmation of his surgery date.  The patient will be discharged home with his De León catheter in place.  If he continues to have ongoing problems with his De León catheter prior to his procedure, the possibility of intermittent catheterization will be evaluated by his urologist as an outpatient.  At this time the patient has no further need for acute intervention and is safe for discharge.       Therefore, he is discharged in good and stable condition to home with close outpatient follow-up.    Discharge Date  3/21/2019    DISCHARGE  DIAGNOSES  Principal Problem:    BPH (benign prostatic hyperplasia) POA: Unknown  Active Problems:    Type 2 diabetes mellitus with hyperglycemia, with long-term current use of insulin (HCC) POA: Unknown    Urinary retention POA: Unknown  Resolved Problems:    * No resolved hospital problems. *      FOLLOW UP    Weston Purcell M.D.  [address] [facility]  [place] [address]  [phone]    Go on 4/5/2019  PLEASE ARRIVE AT 8:15AM FOR YOUR 8:30AM APPOINTMENT. THANK YOU      MEDICATIONS ON DISCHARGE     Medication List      START taking these medications      Instructions   oxybutynin SR 10 MG CR tablet  Commonly known as:  DITROPAN-XL   Take 1 Tab by mouth every evening.  Dose:  10 mg        CONTINUE taking these medications      Instructions   JANUVIA 100 MG Tabs  Generic drug:  SITagliptin   Take 100 mg by mouth every day.  Dose:  100 mg            Allergies  No Known Allergies    DIET  Orders Placed This Encounter   Procedures   • Diet Order Diabetic     Standing Status:   Standing     Number of Occurrences:   1     Order Specific Question:   Diet:     Answer:   Diabetic [3]       ACTIVITY  As tolerated.    CONSULTATIONS  Urology-Dr. Freedman    LABORATORY  Lab Results   Component Value Date    SODIUM 133 (L) 03/20/2019    POTASSIUM 4.1 03/20/2019    CHLORIDE 99 03/20/2019    CO2 20 03/20/2019    GLUCOSE 395 (H) 03/20/2019    BUN 15 03/20/2019    CREATININE 0.86 03/20/2019        Lab Results   Component Value Date    WBC 11.1 (H) 03/20/2019    HEMOGLOBIN 14.3 03/20/2019    HEMATOCRIT 42.8 03/20/2019    PLATELETCT 269 03/20/2019

## 2019-03-21 NOTE — DISCHARGE PLANNING
Received call back from [employer] and [employer] and Transport unavailable. Patient updated and states he figured that. Azeb GODINEZ updated.

## 2019-03-21 NOTE — ED NOTES
Patient repositioned in bed and lights turned off for comfort. Patient given pain medications. Patient has no complaints at this time. NAD. AMBER

## 2019-03-21 NOTE — ASSESSMENT & PLAN NOTE
Oupatient plans for TURP with Dr. Solano noted in chart.  Will consult urology in AM given recurrent episodes of hematuria/clotting requiring ED visits.  May need additional education regarding flushing catheter at home. Continue current de león catheter.

## 2019-03-21 NOTE — PROGRESS NOTES
Pt asking to be discharged. Updated MD. MD stated pt will have to leave AMA if he wants to leave now. Pt agreeing to stay and wait for urology

## 2019-03-21 NOTE — ED PROVIDER NOTES
ED Provider Note    CHIEF COMPLAINT  Chief Complaint   Patient presents with   • Urinary Catheter Problem     Patient had a de león placed at [facility] 2 days ago for bloody urine. Patient has noticed continued bloody urine with clots. Patient is having pain with urine leaking around the catheter.        HPI  Alec Howell is a 78 y.o. male who presents clogged up urinary catheter.  Duration has been for 1 day.  He has been able to urinate.  He states that this is not new it has been going on for a few weeks.  Apparently, the patient states that he has had relief when catheter gets replaced but then soon clots up after a few days.  Patient states that he has a history of prostate issues will get surgery on the 28th.  He has been seen multiple times at multiple hospitals regarding blocked De León catheter.  He states he is unable to flush it at home due to inability to get the clots out and therefore, has to come in several times.    REVIEW OF SYSTEMS  General: No fever or chills.  Eyes: No eye discharge. No eye pain.  Ear nose throat: No sore throat or  trouble swallowing.  Pulmonary: No shortness of breath or cough.  Cardiovascular: No chest pain or chest pressure.  GI: Lower abdominal pain  : See above  Dermatologic: No rashes. No abrasions.  Neurologic: No weakness or numbness.      All other systems are negative      PAST MEDICAL HISTORY  Past Medical History:   Diagnosis Date   • Diabetes (HCC)    • Hypertension        FAMILY HISTORY  History reviewed. No pertinent family history.    SOCIAL HISTORY  Social History     Social History   • Marital status: Single     Spouse name: N/A   • Number of children: N/A   • Years of education: N/A     Social History Main Topics   • Smoking status: Current Some Day Smoker     Types: Cigars   • Smokeless tobacco: Never Used   • Alcohol use No   • Drug use: No   • Sexual activity: Not on file     Other Topics Concern   • Not on file     Social History Narrative   • No narrative  "on file       SURGICAL HISTORY  Past Surgical History:   Procedure Laterality Date   • OTHER CARDIAC SURGERY      Cardiac stent x1       CURRENT MEDICATIONS  Home Medications     Reviewed by Angel Hooks R.N. (Registered Nurse) on 03/20/19 at 2138  Med List Status: Not Addressed   Medication Last Dose Status   Insulin Aspart (NOVOLOG FLEXPEN SC)  Active   SITagliptin (JANUVIA) 100 MG Tab  Active                 ALLERGIES  No Known Allergies    PHYSICAL EXAM  VITAL SIGNS: BP (!) 164/90   Pulse 97   Temp 36.7 °C (98 °F) (Temporal)   Resp 18   Ht 1.778 m (5' 10\")   Wt 78 kg (172 lb)   SpO2 97%   BMI 24.68 kg/m²   Constitutional: Well developed, Well nourished, No acute distress, Non-toxic appearance.   HENT: Normocephalic, Atraumatic, Bilateral external ears normal, Oropharynx moist, No oral exudates, Nose normal.   Eyes: PERRLA, EOMI, Conjunctiva normal, No discharge.   Musculoskeletal: Neck has normal range of motion, No tenderness, Supple.   Lymphatic: No cervical lymphadenopathy noted.   Cardiovascular: Normal heart rate, Normal rhythm, No murmurs, No rubs, No gallops.   Thorax & Lungs: Normal breath sounds, No respiratory distress, No wheezing, No chest tenderness.   Abdomen: Similar tenderness in the abdomen.  Firm noted.  Skin: Warm, Dry, No erythema, No rash.   : Dirty catheter is clogged and appears not to be draining.  Psychiatric: Calm, not anxious  Neurologic: Alert & oriented, moves all extremities equally    RADIOLOGY/PROCEDURES  Flushing of Callejas cath  Patient had a Milton syringe and 20 cc of saline flushed in the suction was attempted.  No clots came out.  Catheter was then removed    Results for orders placed or performed during the hospital encounter of 03/20/19   CBC WITH DIFFERENTIAL   Result Value Ref Range    WBC 11.1 (H) 4.8 - 10.8 K/uL    RBC 4.80 4.70 - 6.10 M/uL    Hemoglobin 14.3 14.0 - 18.0 g/dL    Hematocrit 42.8 42.0 - 52.0 %    MCV 89.2 81.4 - 97.8 fL    MCH 29.8 27.0 - " 33.0 pg    MCHC 33.4 (L) 33.7 - 35.3 g/dL    RDW 39.8 35.9 - 50.0 fL    Platelet Count 269 164 - 446 K/uL    MPV 10.9 9.0 - 12.9 fL    Neutrophils-Polys 75.80 (H) 44.00 - 72.00 %    Lymphocytes 13.60 (L) 22.00 - 41.00 %    Monocytes 6.50 0.00 - 13.40 %    Eosinophils 2.70 0.00 - 6.90 %    Basophils 0.80 0.00 - 1.80 %    Immature Granulocytes 0.60 0.00 - 0.90 %    Nucleated RBC 0.00 /100 WBC    Neutrophils (Absolute) 8.44 (H) 1.82 - 7.42 K/uL    Lymphs (Absolute) 1.52 1.00 - 4.80 K/uL    Monos (Absolute) 0.72 0.00 - 0.85 K/uL    Eos (Absolute) 0.30 0.00 - 0.51 K/uL    Baso (Absolute) 0.09 0.00 - 0.12 K/uL    Immature Granulocytes (abs) 0.07 0.00 - 0.11 K/uL    NRBC (Absolute) 0.00 K/uL   PROTHROMBIN TIME (INR)   Result Value Ref Range    PT 14.0 12.0 - 14.6 sec    INR 1.07 0.87 - 1.13   URINALYSIS,CULTURE IF INDICATED   Result Value Ref Range    Color Yellow     Character Cloudy (A)     Specific Gravity 1.028 <1.035    Ph 5.5 5.0 - 8.0    Glucose >=1000 (A) Negative mg/dL    Ketones 15 (A) Negative mg/dL    Protein 30 (A) Negative mg/dL    Bilirubin Negative Negative    Urobilinogen, Urine 0.2 Negative    Nitrite Negative Negative    Leukocyte Esterase Small (A) Negative    Occult Blood Large (A) Negative    Micro Urine Req Microscopic    URINE MICROSCOPIC (W/UA)   Result Value Ref Range    WBC  (A) /hpf    RBC >150 (A) /hpf    Bacteria Few (A) None /hpf    Epithelial Cells Negative /hpf    Hyaline Cast 3-5 (A) /lpf        COURSE & MEDICAL DECISION MAKING  Pertinent Labs & Imaging studies reviewed. (See chart for details)  Blocked urinary catheter patient really cannot take care of himself appropriately.  Appears to have large clots regardless the patient has failed and has been to several hospitals will get admitted to the hospital continue IV Callejas catheter drainage and continuous bladder irrigation.    Apparently, the patient had Callejas catheter replaced the patient then had clear urine, there is no blood.   Urinalysis shows  white cells with few bacteria and leukocyte esterase positive this could be suspicious for UTI versus colonization.  White cell count is elevated.  Patient has multiple manipulations of skull and treated for suspected UTI in addition, the patient will get IV fluids.  I Yorty spoke to the hospitalist.  I am not calling the urologist at this time is that they can be called in the morning and possibly the patient could get either better Callejas catheter care or they may have to accelerate his time counter for surgery.  Patient is stable condition    FINAL IMPRESSION  1.  Attention to Callejas catheter  2.  Possible UTI  3.      Electronically signed by: Miguelito Delgadillo, 3/20/2019 10:13 PM

## 2019-03-21 NOTE — PROGRESS NOTES
"While during morning assessment pt asked to eat. Educated pt of NPO status. \"Are you starving me or what?\" Pt informed we are contacting urology to determine if pt is going to have surgery. \"is there a [facility] I can order from\" Pt made again informed of NPO status   "

## 2019-03-21 NOTE — DISCHARGE INSTRUCTIONS
Discharge Instructions    Discharged to home by taxi with self. Discharged via wheelchair, hospital escort: Yes.  Special equipment needed: Not Applicable    Be sure to schedule a follow-up appointment with your primary care doctor or any specialists as instructed.     Discharge Plan:   Diet Plan: Discussed  Activity Level: Discussed  Smoking Cessation Offered: Patient Refused  Confirmed Follow up Appointment: Appointment Scheduled  Confirmed Symptoms Management: Discussed  Medication Reconciliation Updated: Yes  Influenza Vaccine Indication: Patient Refuses    I understand that a diet low in cholesterol, fat, and sodium is recommended for good health. Unless I have been given specific instructions below for another diet, I accept this instruction as my diet prescription.   Other diet: Heart Healthy     Special Instructions: None    · Is patient discharged on Warfarin / Coumadin?   No     Depression / Suicide Risk    As you are discharged from this [facility] facility, it is important to learn how to keep safe from harming yourself.    Recognize the warning signs:  · Abrupt changes in personality, positive or negative- including increase in energy   · Giving away possessions  · Change in eating patterns- significant weight changes-  positive or negative  · Change in sleeping patterns- unable to sleep or sleeping all the time   · Unwillingness or inability to communicate  · Depression  · Unusual sadness, discouragement and loneliness  · Talk of wanting to die  · Neglect of personal appearance   · Rebelliousness- reckless behavior  · Withdrawal from people/activities they love  · Confusion- inability to concentrate     If you or a loved one observes any of these behaviors or has concerns about self-harm, here's what you can do:  · Talk about it- your feelings and reasons for harming yourself  · Remove any means that you might use to hurt yourself (examples: pills, rope, extension cords, firearm)  · Get professional  help from the community (Mental Health, Substance Abuse, psychological counseling)  · Do not be alone:Call your Safe Contact- someone whom you trust who will be there for you.  · Call your local CRISIS HOTLINE 146-2748 or 147-875-7011  · Call your local Children's Mobile Crisis Response Team [place] (065) 179-0673 or www.POPRAGEOUS  · Call the toll free National Suicide Prevention Hotlines   · National Suicide Prevention Lifeline 574-476-SRCO (5943)  · [place] Hope Line Network 800-SUICIDE (778-2673)    Callejas Catheter Care, Adult  A Callejas catheter is a soft, flexible tube that is placed into the bladder to drain urine. A Callejas catheter may be inserted if:  · You leak urine or are not able to control when you urinate (urinary incontinence).  · You are not able to urinate when you need to (urinary retention).  · You had prostate surgery or surgery on the genitals.  · You have certain medical conditions, such as multiple sclerosis, dementia, or a spinal cord injury.  If you are going home with a Callejas catheter in place, follow the instructions below.  TAKING CARE OF THE CATHETER  1. Wash your hands with soap and water.  2. Using mild soap and warm water on a clean washcloth:  ¨ Clean the area on your body closest to the catheter insertion site using a circular motion, moving away from the catheter. Never wipe toward the catheter because this could sweep bacteria up into the urethra and cause infection.  ¨ Remove all traces of soap. Pat the area dry with a clean towel. For males, reposition the foreskin.  3. Attach the catheter to your leg so there is no tension on the catheter. Use adhesive tape or a leg strap. If you are using adhesive tape, remove any sticky residue left behind by the previous tape you used.  4. Keep the drainage bag below the level of the bladder, but keep it off the floor.  5. Check throughout the day to be sure the catheter is working and urine is draining freely. Make sure the tubing  does not become kinked.  6. Do not pull on the catheter or try to remove it. Pulling could damage internal tissues.  TAKING CARE OF THE DRAINAGE BAGS  You will be given two drainage bags to take home. One is a large overnight drainage bag, and the other is a smaller leg bag that fits underneath clothing. You may wear the overnight bag at any time, but you should never wear the smaller leg bag at night. Follow the instructions below for how to empty, change, and clean your drainage bags.  Emptying the Drainage Bag  You must empty your drainage bag when it is  -½ full or at least 2-3 times a day.  2. Wash your hands with soap and water.  3. Keep the drainage bag below your hips, below the level of your bladder. This stops urine from going back into the tubing and into your bladder.  4. Hold the dirty bag over the toilet or a clean container.  5. Open the pour spout at the bottom of the bag and empty the urine into the toilet or container. Do not let the pour spout touch the toilet, container, or any other surface. Doing so can place bacteria on the bag, which can cause an infection.  6. Clean the pour spout with a gauze pad or cotton ball that has rubbing alcohol on it.  7. Close the pour spout.  8. Attach the bag to your leg with adhesive tape or a leg strap.  9. Wash your hands well.  Changing the Drainage Bag  Change your drainage bag once a month or sooner if it starts to smell bad or look dirty. Below are steps to follow when changing the drainage bag.  2. Wash your hands with soap and water.  3. Pinch off the rubber catheter so that urine does not spill out.  4. Disconnect the catheter tube from the drainage tube at the connection valve. Do not let the tubes touch any surface.  5. Clean the end of the catheter tube with an alcohol wipe. Use a different alcohol wipe to clean the end of the drainage tube.  6. Connect the catheter tube to the drainage tube of the clean drainage bag.  7. Attach the new bag to the  leg with adhesive tape or a leg strap. Avoid attaching the new bag too tightly.  8. Wash your hands well.  Cleaning the Drainage Bag  1. Wash your hands with soap and water.  2. Wash the bag in warm, soapy water.  3. Rinse the bag thoroughly with warm water.  4. Fill the bag with a solution of white vinegar and water (1 cup vinegar to 1 qt warm water [.2 L vinegar to 1 L warm water]). Close the bag and soak it for 30 minutes in the solution.  5. Rinse the bag with warm water.  6. Hang the bag to dry with the pour spout open and hanging downward.  7. Store the clean bag (once it is dry) in a clean plastic bag.  8. Wash your hands well.  PREVENTING INFECTION  · Wash your hands before and after handling your catheter.  · Take showers daily and wash the area where the catheter enters your body. Do not take baths. Replace wet leg straps with dry ones, if this applies.  · Do not use powders, sprays, or lotions on the genital area. Only use creams, lotions, or ointments as directed by your caregiver.  · For females, wipe from front to back after each bowel movement.  · Drink enough fluids to keep your urine clear or pale yellow unless you have a fluid restriction.  · Do not let the drainage bag or tubing touch or lie on the floor.  · Wear cotton underwear to absorb moisture and to keep your .  SEEK MEDICAL CARE IF:   · Your urine is cloudy or smells unusually bad.  · Your catheter becomes clogged.  · You are not draining urine into the bag or your bladder feels full.  · Your catheter starts to leak.  SEEK IMMEDIATE MEDICAL CARE IF:   · You have pain, swelling, redness, or pus where the catheter enters the body.  · You have pain in the abdomen, legs, lower back, or bladder.  · You have a fever.  · You see blood fill the catheter, or your urine is pink or red.  · You have nausea, vomiting, or chills.  · Your catheter gets pulled out.  MAKE SURE YOU:   · Understand these instructions.  · Will watch your  condition.  · Will get help right away if you are not doing well or get worse.     This information is not intended to replace advice given to you by your health care provider. Make sure you discuss any questions you have with your health care provider.     Document Released: 12/18/2006 Document Revised: 05/04/2015 Document Reviewed: 12/09/2013  [employer] Patient Education ©2016 [employer]

## 2019-03-21 NOTE — PROGRESS NOTES
Discharging patient home. Verbalized understanding of discharge instructions, follow up appointments, and home care. All questions answered.  Belongings with patient at time of discharge.  Vitals signs WNL. Pt given discharge information. Discharge assessment completed.   Callejas care teaching completed

## 2019-03-23 LAB
BACTERIA UR CULT: ABNORMAL
BACTERIA UR CULT: ABNORMAL
SIGNIFICANT IND 70042: ABNORMAL
SITE SITE: ABNORMAL
SOURCE SOURCE: ABNORMAL

## 2019-04-08 ENCOUNTER — ANESTHESIA (OUTPATIENT)
Dept: SURGERY | Facility: MEDICAL CENTER | Age: 79
End: 2019-04-08
Payer: MEDICARE

## 2019-04-08 ENCOUNTER — HOSPITAL ENCOUNTER (OUTPATIENT)
Facility: MEDICAL CENTER | Age: 79
End: 2019-04-09
Attending: UROLOGY | Admitting: UROLOGY
Payer: MEDICARE

## 2019-04-08 ENCOUNTER — ANESTHESIA EVENT (OUTPATIENT)
Dept: SURGERY | Facility: MEDICAL CENTER | Age: 79
End: 2019-04-08
Payer: MEDICARE

## 2019-04-08 DIAGNOSIS — Z79.4 TYPE 2 DIABETES MELLITUS WITH HYPERGLYCEMIA, WITH LONG-TERM CURRENT USE OF INSULIN (HCC): ICD-10-CM

## 2019-04-08 DIAGNOSIS — E11.65 TYPE 2 DIABETES MELLITUS WITH HYPERGLYCEMIA, WITH LONG-TERM CURRENT USE OF INSULIN (HCC): ICD-10-CM

## 2019-04-08 LAB
GLUCOSE BLD-MCNC: 200 MG/DL (ref 65–99)
GLUCOSE BLD-MCNC: 292 MG/DL (ref 65–99)
GLUCOSE BLD-MCNC: 348 MG/DL (ref 65–99)
GLUCOSE BLD-MCNC: 427 MG/DL (ref 65–99)
PATHOLOGY CONSULT NOTE: NORMAL

## 2019-04-08 PROCEDURE — A6222 GAUZE <=16 IN NO W/SAL W/O B: HCPCS | Performed by: UROLOGY

## 2019-04-08 PROCEDURE — 700105 HCHG RX REV CODE 258

## 2019-04-08 PROCEDURE — 700101 HCHG RX REV CODE 250: Performed by: ANESTHESIOLOGY

## 2019-04-08 PROCEDURE — 501329 HCHG SET, CYSTO IRRIG Y TUR: Performed by: UROLOGY

## 2019-04-08 PROCEDURE — 700111 HCHG RX REV CODE 636 W/ 250 OVERRIDE (IP)

## 2019-04-08 PROCEDURE — 160035 HCHG PACU - 1ST 60 MINS PHASE I: Performed by: UROLOGY

## 2019-04-08 PROCEDURE — A4346 CATH INDW FOLEY 3 WAY: HCPCS | Performed by: UROLOGY

## 2019-04-08 PROCEDURE — 700102 HCHG RX REV CODE 250 W/ 637 OVERRIDE(OP): Performed by: UROLOGY

## 2019-04-08 PROCEDURE — 160036 HCHG PACU - EA ADDL 30 MINS PHASE I: Performed by: UROLOGY

## 2019-04-08 PROCEDURE — 500880 HCHG PACK, CYSTO W/SEP LEGGINGS: Performed by: UROLOGY

## 2019-04-08 PROCEDURE — 700102 HCHG RX REV CODE 250 W/ 637 OVERRIDE(OP): Performed by: ANESTHESIOLOGY

## 2019-04-08 PROCEDURE — 160039 HCHG SURGERY MINUTES - EA ADDL 1 MIN LEVEL 3: Performed by: UROLOGY

## 2019-04-08 PROCEDURE — 700101 HCHG RX REV CODE 250

## 2019-04-08 PROCEDURE — 88305 TISSUE EXAM BY PATHOLOGIST: CPT

## 2019-04-08 PROCEDURE — 160028 HCHG SURGERY MINUTES - 1ST 30 MINS LEVEL 3: Performed by: UROLOGY

## 2019-04-08 PROCEDURE — A6454 SELF-ADHER BAND W>=3" <5"/YD: HCPCS | Performed by: UROLOGY

## 2019-04-08 PROCEDURE — 700111 HCHG RX REV CODE 636 W/ 250 OVERRIDE (IP): Performed by: UROLOGY

## 2019-04-08 PROCEDURE — A9270 NON-COVERED ITEM OR SERVICE: HCPCS | Performed by: UROLOGY

## 2019-04-08 PROCEDURE — A4357 BEDSIDE DRAINAGE BAG: HCPCS | Performed by: UROLOGY

## 2019-04-08 PROCEDURE — G0378 HOSPITAL OBSERVATION PER HR: HCPCS

## 2019-04-08 PROCEDURE — 501838 HCHG SUTURE GENERAL: Performed by: UROLOGY

## 2019-04-08 PROCEDURE — 82962 GLUCOSE BLOOD TEST: CPT | Mod: 91

## 2019-04-08 PROCEDURE — 700105 HCHG RX REV CODE 258: Performed by: ANESTHESIOLOGY

## 2019-04-08 PROCEDURE — 160002 HCHG RECOVERY MINUTES (STAT): Performed by: UROLOGY

## 2019-04-08 PROCEDURE — 160048 HCHG OR STATISTICAL LEVEL 1-5: Performed by: UROLOGY

## 2019-04-08 PROCEDURE — 160009 HCHG ANES TIME/MIN: Performed by: UROLOGY

## 2019-04-08 PROCEDURE — 700111 HCHG RX REV CODE 636 W/ 250 OVERRIDE (IP): Performed by: ANESTHESIOLOGY

## 2019-04-08 RX ORDER — TAMSULOSIN HYDROCHLORIDE 0.4 MG/1
0.4 CAPSULE ORAL EVERY MORNING
Status: DISCONTINUED | OUTPATIENT
Start: 2019-04-08 | End: 2019-04-09 | Stop reason: HOSPADM

## 2019-04-08 RX ORDER — BUPIVACAINE HYDROCHLORIDE 2.5 MG/ML
INJECTION, SOLUTION EPIDURAL; INFILTRATION; INTRACAUDAL
Status: DISCONTINUED | OUTPATIENT
Start: 2019-04-08 | End: 2019-04-08 | Stop reason: HOSPADM

## 2019-04-08 RX ORDER — ONDANSETRON 2 MG/ML
4 INJECTION INTRAMUSCULAR; INTRAVENOUS
Status: DISCONTINUED | OUTPATIENT
Start: 2019-04-08 | End: 2019-04-08 | Stop reason: HOSPADM

## 2019-04-08 RX ORDER — CIPROFLOXACIN 2 MG/ML
INJECTION, SOLUTION INTRAVENOUS PRN
Status: DISCONTINUED | OUTPATIENT
Start: 2019-04-08 | End: 2019-04-08 | Stop reason: SURG

## 2019-04-08 RX ORDER — PHENYLEPHRINE HYDROCHLORIDE 10 MG/ML
INJECTION, SOLUTION INTRAMUSCULAR; INTRAVENOUS; SUBCUTANEOUS PRN
Status: DISCONTINUED | OUTPATIENT
Start: 2019-04-08 | End: 2019-04-08 | Stop reason: SURG

## 2019-04-08 RX ORDER — SODIUM CHLORIDE 9 MG/ML
INJECTION, SOLUTION INTRAVENOUS
Status: ACTIVE | OUTPATIENT
Start: 2019-04-08 | End: 2019-04-08

## 2019-04-08 RX ORDER — MEPERIDINE HYDROCHLORIDE 25 MG/ML
12.5 INJECTION INTRAMUSCULAR; INTRAVENOUS; SUBCUTANEOUS
Status: DISCONTINUED | OUTPATIENT
Start: 2019-04-08 | End: 2019-04-08 | Stop reason: HOSPADM

## 2019-04-08 RX ORDER — DIPHENHYDRAMINE HYDROCHLORIDE 50 MG/ML
12.5 INJECTION INTRAMUSCULAR; INTRAVENOUS
Status: DISCONTINUED | OUTPATIENT
Start: 2019-04-08 | End: 2019-04-08 | Stop reason: HOSPADM

## 2019-04-08 RX ORDER — DEXAMETHASONE SODIUM PHOSPHATE 4 MG/ML
INJECTION, SOLUTION INTRA-ARTICULAR; INTRALESIONAL; INTRAMUSCULAR; INTRAVENOUS; SOFT TISSUE PRN
Status: DISCONTINUED | OUTPATIENT
Start: 2019-04-08 | End: 2019-04-08 | Stop reason: SURG

## 2019-04-08 RX ORDER — OXYCODONE HCL 5 MG/5 ML
5 SOLUTION, ORAL ORAL
Status: DISCONTINUED | OUTPATIENT
Start: 2019-04-08 | End: 2019-04-08 | Stop reason: HOSPADM

## 2019-04-08 RX ORDER — SULFAMETHOXAZOLE AND TRIMETHOPRIM 800; 160 MG/1; MG/1
1 TABLET ORAL 2 TIMES DAILY
COMMUNITY
End: 2022-09-12

## 2019-04-08 RX ORDER — SODIUM CHLORIDE, SODIUM LACTATE, POTASSIUM CHLORIDE, CALCIUM CHLORIDE 600; 310; 30; 20 MG/100ML; MG/100ML; MG/100ML; MG/100ML
INJECTION, SOLUTION INTRAVENOUS CONTINUOUS
Status: DISCONTINUED | OUTPATIENT
Start: 2019-04-08 | End: 2019-04-08 | Stop reason: HOSPADM

## 2019-04-08 RX ORDER — ONDANSETRON 2 MG/ML
4 INJECTION INTRAMUSCULAR; INTRAVENOUS EVERY 4 HOURS PRN
Status: DISCONTINUED | OUTPATIENT
Start: 2019-04-08 | End: 2019-04-09 | Stop reason: HOSPADM

## 2019-04-08 RX ORDER — SODIUM CHLORIDE, SODIUM LACTATE, POTASSIUM CHLORIDE, CALCIUM CHLORIDE 600; 310; 30; 20 MG/100ML; MG/100ML; MG/100ML; MG/100ML
INJECTION, SOLUTION INTRAVENOUS
Status: DISCONTINUED | OUTPATIENT
Start: 2019-04-08 | End: 2019-04-08 | Stop reason: SURG

## 2019-04-08 RX ORDER — OXYCODONE HCL 5 MG/5 ML
10 SOLUTION, ORAL ORAL
Status: DISCONTINUED | OUTPATIENT
Start: 2019-04-08 | End: 2019-04-08 | Stop reason: HOSPADM

## 2019-04-08 RX ORDER — ATROPA BELLADONNA AND OPIUM 16.2; 6 MG/1; MG/1
60 SUPPOSITORY RECTAL EVERY 12 HOURS PRN
Status: DISCONTINUED | OUTPATIENT
Start: 2019-04-08 | End: 2019-04-09 | Stop reason: HOSPADM

## 2019-04-08 RX ORDER — SULFAMETHOXAZOLE AND TRIMETHOPRIM 800; 160 MG/1; MG/1
1 TABLET ORAL 2 TIMES DAILY
Status: DISCONTINUED | OUTPATIENT
Start: 2019-04-08 | End: 2019-04-09 | Stop reason: HOSPADM

## 2019-04-08 RX ORDER — ONDANSETRON 2 MG/ML
INJECTION INTRAMUSCULAR; INTRAVENOUS PRN
Status: DISCONTINUED | OUTPATIENT
Start: 2019-04-08 | End: 2019-04-08 | Stop reason: SURG

## 2019-04-08 RX ORDER — HALOPERIDOL 5 MG/ML
1 INJECTION INTRAMUSCULAR
Status: DISCONTINUED | OUTPATIENT
Start: 2019-04-08 | End: 2019-04-08 | Stop reason: HOSPADM

## 2019-04-08 RX ORDER — OXYBUTYNIN CHLORIDE 10 MG/1
10 TABLET, EXTENDED RELEASE ORAL EVERY EVENING
Status: DISCONTINUED | OUTPATIENT
Start: 2019-04-08 | End: 2019-04-09 | Stop reason: HOSPADM

## 2019-04-08 RX ORDER — OXYCODONE HYDROCHLORIDE AND ACETAMINOPHEN 5; 325 MG/1; MG/1
1 TABLET ORAL EVERY 4 HOURS PRN
Status: DISCONTINUED | OUTPATIENT
Start: 2019-04-08 | End: 2019-04-09 | Stop reason: HOSPADM

## 2019-04-08 RX ORDER — SODIUM CHLORIDE, SODIUM LACTATE, POTASSIUM CHLORIDE, CALCIUM CHLORIDE 600; 310; 30; 20 MG/100ML; MG/100ML; MG/100ML; MG/100ML
1000 INJECTION, SOLUTION INTRAVENOUS
Status: COMPLETED | OUTPATIENT
Start: 2019-04-08 | End: 2019-04-08

## 2019-04-08 RX ORDER — SODIUM CHLORIDE 9 MG/ML
INJECTION, SOLUTION INTRAVENOUS
Status: COMPLETED
Start: 2019-04-08 | End: 2019-04-08

## 2019-04-08 RX ORDER — POLYETHYLENE GLYCOL 3350 17 G/17G
1 POWDER, FOR SOLUTION ORAL DAILY
Status: DISCONTINUED | OUTPATIENT
Start: 2019-04-08 | End: 2019-04-09 | Stop reason: HOSPADM

## 2019-04-08 RX ORDER — DEXTROSE MONOHYDRATE 25 G/50ML
25 INJECTION, SOLUTION INTRAVENOUS
Status: DISCONTINUED | OUTPATIENT
Start: 2019-04-08 | End: 2019-04-08

## 2019-04-08 RX ORDER — TAMSULOSIN HYDROCHLORIDE 0.4 MG/1
0.4 CAPSULE ORAL EVERY MORNING
COMMUNITY
End: 2022-09-12

## 2019-04-08 RX ADMIN — PROPOFOL 200 MG: 10 INJECTION, EMULSION INTRAVENOUS at 12:00

## 2019-04-08 RX ADMIN — SODIUM CHLORIDE, SODIUM LACTATE, POTASSIUM CHLORIDE, CALCIUM CHLORIDE 1000 ML: 600; 310; 30; 20 INJECTION, SOLUTION INTRAVENOUS at 11:52

## 2019-04-08 RX ADMIN — TAMSULOSIN HYDROCHLORIDE 0.4 MG: 0.4 CAPSULE ORAL at 16:33

## 2019-04-08 RX ADMIN — SODIUM CHLORIDE, POTASSIUM CHLORIDE, SODIUM LACTATE AND CALCIUM CHLORIDE: 600; 310; 30; 20 INJECTION, SOLUTION INTRAVENOUS at 12:53

## 2019-04-08 RX ADMIN — LIDOCAINE HYDROCHLORIDE 0.5 ML: 10 INJECTION, SOLUTION EPIDURAL; INFILTRATION; INTRACAUDAL; PERINEURAL at 11:05

## 2019-04-08 RX ADMIN — SULFAMETHOXAZOLE AND TRIMETHOPRIM 1 TABLET: 800; 160 TABLET ORAL at 17:47

## 2019-04-08 RX ADMIN — SITAGLIPTIN 100 MG: 100 TABLET, FILM COATED ORAL at 16:40

## 2019-04-08 RX ADMIN — PHENYLEPHRINE HYDROCHLORIDE 100 MCG: 10 INJECTION INTRAVENOUS at 12:16

## 2019-04-08 RX ADMIN — INSULIN HUMAN 4 UNITS: 100 INJECTION, SOLUTION PARENTERAL at 21:37

## 2019-04-08 RX ADMIN — INSULIN HUMAN 1 UNITS: 100 INJECTION, SOLUTION PARENTERAL at 16:41

## 2019-04-08 RX ADMIN — PROPOFOL 50 MG: 10 INJECTION, EMULSION INTRAVENOUS at 12:57

## 2019-04-08 RX ADMIN — POLYETHYLENE GLYCOL 3350 1 PACKET: 17 POWDER, FOR SOLUTION ORAL at 16:34

## 2019-04-08 RX ADMIN — OXYBUTYNIN CHLORIDE 10 MG: 10 TABLET, EXTENDED RELEASE ORAL at 17:47

## 2019-04-08 RX ADMIN — PHENYLEPHRINE HYDROCHLORIDE 100 MCG: 10 INJECTION INTRAVENOUS at 13:32

## 2019-04-08 RX ADMIN — ATROPA BELLADONNA AND OPIUM 60 MG: 16.2; 6 SUPPOSITORY RECTAL at 15:10

## 2019-04-08 RX ADMIN — CIPROFLOXACIN 400 MG: 2 INJECTION INTRAVENOUS at 12:09

## 2019-04-08 RX ADMIN — SODIUM CHLORIDE, POTASSIUM CHLORIDE, SODIUM LACTATE AND CALCIUM CHLORIDE: 600; 310; 30; 20 INJECTION, SOLUTION INTRAVENOUS at 11:56

## 2019-04-08 RX ADMIN — DEXAMETHASONE SODIUM PHOSPHATE 4 MG: 4 INJECTION, SOLUTION INTRA-ARTICULAR; INTRALESIONAL; INTRAMUSCULAR; INTRAVENOUS; SOFT TISSUE at 12:20

## 2019-04-08 RX ADMIN — PHENYLEPHRINE HYDROCHLORIDE 100 MCG: 10 INJECTION INTRAVENOUS at 13:12

## 2019-04-08 RX ADMIN — FENTANYL CITRATE 100 MCG: 50 INJECTION, SOLUTION INTRAMUSCULAR; INTRAVENOUS at 12:41

## 2019-04-08 RX ADMIN — FENTANYL CITRATE 100 MCG: 50 INJECTION, SOLUTION INTRAMUSCULAR; INTRAVENOUS at 12:00

## 2019-04-08 RX ADMIN — SODIUM CHLORIDE 1000 ML: 9 INJECTION, SOLUTION INTRAVENOUS at 14:42

## 2019-04-08 RX ADMIN — EPHEDRINE SULFATE 5 MG: 50 INJECTION INTRAMUSCULAR; INTRAVENOUS; SUBCUTANEOUS at 12:16

## 2019-04-08 RX ADMIN — ONDANSETRON 4 MG: 2 INJECTION INTRAMUSCULAR; INTRAVENOUS at 12:20

## 2019-04-08 RX ADMIN — INSULIN HUMAN 10 UNITS: 100 INJECTION, SOLUTION PARENTERAL at 11:48

## 2019-04-08 RX ADMIN — FENTANYL CITRATE 50 MCG: 50 INJECTION, SOLUTION INTRAMUSCULAR; INTRAVENOUS at 12:46

## 2019-04-08 RX ADMIN — PHENYLEPHRINE HYDROCHLORIDE 100 MCG: 10 INJECTION INTRAVENOUS at 12:36

## 2019-04-08 ASSESSMENT — COGNITIVE AND FUNCTIONAL STATUS - GENERAL
WALKING IN HOSPITAL ROOM: A LITTLE
DAILY ACTIVITIY SCORE: 24
SUGGESTED CMS G CODE MODIFIER DAILY ACTIVITY: CH
SUGGESTED CMS G CODE MODIFIER MOBILITY: CI
MOBILITY SCORE: 23

## 2019-04-08 ASSESSMENT — COPD QUESTIONNAIRES
COPD SCREENING SCORE: 4
DURING THE PAST 4 WEEKS HOW MUCH DID YOU FEEL SHORT OF BREATH: NONE/LITTLE OF THE TIME
IN THE PAST 12 MONTHS DO YOU DO LESS THAN YOU USED TO BECAUSE OF YOUR BREATHING PROBLEMS: DISAGREE/UNSURE
HAVE YOU SMOKED AT LEAST 100 CIGARETTES IN YOUR ENTIRE LIFE: YES
DO YOU EVER COUGH UP ANY MUCUS OR PHLEGM?: NO/ONLY WITH OCCASIONAL COLDS OR INFECTIONS

## 2019-04-08 ASSESSMENT — PATIENT HEALTH QUESTIONNAIRE - PHQ9
SUM OF ALL RESPONSES TO PHQ QUESTIONS 1-9: 13
6. FEELING BAD ABOUT YOURSELF - OR THAT YOU ARE A FAILURE OR HAVE LET YOURSELF OR YOUR FAMILY DOWN: SEVERAL DAYS
7. TROUBLE CONCENTRATING ON THINGS, SUCH AS READING THE NEWSPAPER OR WATCHING TELEVISION: NOT AT ALL
3. TROUBLE FALLING OR STAYING ASLEEP OR SLEEPING TOO MUCH: MORE THAN HALF THE DAYS
9. THOUGHTS THAT YOU WOULD BE BETTER OFF DEAD, OR OF HURTING YOURSELF: NOT AT ALL
2. FEELING DOWN, DEPRESSED, IRRITABLE, OR HOPELESS: MORE THAN HALF THE DAYS
5. POOR APPETITE OR OVEREATING: MORE THAN HALF THE DAYS
SUM OF ALL RESPONSES TO PHQ9 QUESTIONS 1 AND 2: 5
1. LITTLE INTEREST OR PLEASURE IN DOING THINGS: NEARLY EVERY DAY
8. MOVING OR SPEAKING SO SLOWLY THAT OTHER PEOPLE COULD HAVE NOTICED. OR THE OPPOSITE, BEING SO FIGETY OR RESTLESS THAT YOU HAVE BEEN MOVING AROUND A LOT MORE THAN USUAL: NOT AT ALL
4. FEELING TIRED OR HAVING LITTLE ENERGY: NEARLY EVERY DAY

## 2019-04-08 ASSESSMENT — LIFESTYLE VARIABLES
ALCOHOL_USE: NO
EVER_SMOKED: YES

## 2019-04-08 ASSESSMENT — PAIN SCALES - GENERAL: PAIN_LEVEL: 1

## 2019-04-08 NOTE — OR NURSING
"CBI remains patent and return is clear,CBI rate slowed down.Pt. C/o \"I feel like I have to pee\"-pt. educated re:de león cath and CBI as ordered.B&O supp. given.Tolerating sips of water well.Pt's.son was updated.  "

## 2019-04-08 NOTE — OR SURGEON
Immediate Post OP Note    PreOp Diagnosis: phimosis, BPH with urinary retention    PostOp Diagnosis: same    Procedure(s):  CYSTOSCOPY - Wound Class: Clean Contaminated  CIRCUMCISION, ADULT - Wound Class: Clean Contaminated  TURP (TRANSURETHRAL RESECTION OF PROSTATE) - Wound Class: Clean Contaminated    Surgeon(s):  Celestino Solano M.D.    Anesthesiologist/Type of Anesthesia:  Anesthesiologist: Westley Mcbride M.D./General    Surgical Staff:  Circulator: Socorro Mclain R.N.  Relief Circulator: Purnima Michele R.N.  Relief Scrub: Lillian Sauceda  Scrub Person: Miya Muñoz    Specimens removed if any:  ID Type Source Tests Collected by Time Destination   A : PROSTATE CHIPS Tissue Prostate PATHOLOGY SPECIMEN Celestino Solano M.D. 4/8/2019  1:09 PM        Estimated Blood Loss: 150ml    Findings: phimosis; large trilobar prostate    Complications: none    Drain:  24Fr 3-way de león    Pt is being admitted for CBI due to hematuria.    4/8/2019 1:54 PM Celestino Solano M.D.

## 2019-04-08 NOTE — PROGRESS NOTES
Med rec completed per rx bottles- reviewed with pt. Returned to bedside.   Antibiotics within last 30 days: Yes- bactrim on 4/5/19  Patient allergies have been reviewed: STEPHANIE

## 2019-04-08 NOTE — ANESTHESIA QCDR
2019 [facility] Clinical Data Registry (for Quality Improvement)     Postoperative nausea/vomiting risk protocol (Adult = 18 yrs and Pediatric 3-17 yrs)- (430 and 463)  General inhalation anesthetic (NOT TIVA) with PONV risk factors: Yes  Provision of anti-emetic therapy with at least 2 different classes of agents: Yes   Patient DID NOT receive anti-emetic therapy and reason is documented in Medical Record:  N/A    Multimodal Pain Management- (AQI59)  Patient undergoing Elective Surgery (i.e. Outpatient, or ASC, or Prescheduled Surgery prior to Hospital Admission): Yes  Use of Multimodal Pain Management, two or more drugs and/or interventions, NOT including systemic opioids: Yes   Exception: Documented allergy to multiple classes of analgesics:  N/A    PACU assessment of acute postoperative pain prior to Anesthesia Care End- Applies to Patients Age = 18- (ABG7)  Initial PACU pain score is which of the following: < 7/10  Patient unable to report pain score: N/A    Post-anesthetic transfer of care checklist/protocol to PACU/ICU- (426 and 427)  Upon conclusion of case, patient transferred to which of the following locations: PACU/Non-ICU  Use of transfer checklist/protocol: Yes  Exclusion: Service Performed in Patient Hospital Room (and thus did not require transfer): N/A    PACU Reintubation- (AQI31)  General anesthesia requiring endotracheal intubation (ETT) along with subsequent extubation in OR or PACU: No  Required reintubation in the PACU: N/A  Extubation was a planned trial documented in the medical record prior to removal of the original airway device: N/A    Unplanned admission to ICU related to anesthesia service up through end of PACU care- (MD51)  Unplanned admission to ICU (not initially anticipated at anesthesia start time): No

## 2019-04-08 NOTE — ANESTHESIA TIME REPORT
Anesthesia Start and Stop Event Times     Date Time Event    4/8/2019 1157 Anesthesia Start     1356 Anesthesia Stop        Responsible Staff  04/08/19    Name Role Begin End    Westley Mcbride M.D. Anesth 1157 1356        Preop Diagnosis (Free Text):  Pre-op Diagnosis     BENIGN PROSTATIC HYPERPLASIA; PHIMOSIS        Preop Diagnosis (Codes):  Diagnosis Information     Diagnosis Code(s):         Post op Diagnosis  BPH (benign prostatic hyperplasia)  Phimosis    Premium Reason  Non-Premium    Comments:                                                                  TURP. circumcision

## 2019-04-08 NOTE — OP REPORT
DATE OF SERVICE:  04/08/2019    PREOPERATIVE DIAGNOSES:  Phimosis and benign prostatic hypertrophy with   urinary retention.    POSTOPERATIVE DIAGNOSES:  Phimosis and benign prostatic hypertrophy with   urinary retention.    PROCEDURE PERFORMED:  Circumcision and bipolar transurethral resection of the   prostate.    SURGEON:  Celestino Solano MD    ANESTHESIOLOGIST:  Westley Mcbride MD    ANESTHESIA:  General.    INDICATIONS FOR PROCEDURE:  The patient is a 78-year-old gentleman with tight   phimosis, also with BPH and urinary retention despite medical therapy.  After   discussing treatment options, he has elected for circumcision and TURP.    DESCRIPTION OF PROCEDURE:  After obtaining informed consent, the patient was   brought to the operating room.  After the induction of general anesthesia, he   was placed in supine position and his genitalia were prepped and draped in   sterile fashion after removing his Callejas catheter.  A deep dorsal and   circumferential penile block were performed after which 2 lines were marked   circumferentially around the penis and these were incised using a scalpel.    The foreskin was removed using Metzenbaum scissors and Bovie cautery.  Cautery   was also used to obtain hemostasis.  The wound edges were then reapproximated   using 3-0 chromic in interrupted fashion.  The area was then cleaned and   dried.  Antibiotic ointment, Vaseline gauze and Ana wrap were placed for   dressing.  The patient was then repositioned in dorsal lithotomy position and   his urethral meatus was reprepped.  A 26-Georgian resectoscope was then passed   per urethra into the bladder under direct vision using a visual obturator.    The prostate was large and trilobar.  Bladder was moderately trabeculated.  A   bipolar resecting loop was used to resect the obstructing prostatic tissue   starting at the median lobe and then working on the lateral lobes starting at   the bladder neck, working back to just  proximal to the verumontanum.  Care was   taken not to resect into the bladder, nor distal to the verumontanum.  The   ureteral orifices were identified prior to resection and after and were   uninjured, the TURP chips were then evacuated from the bladder using an [facility]   evacuator and the specimen sent off to pathology.  The resection bed was then   cauterized extensively using the bipolar loop until there was good hemostasis.    There was a good open channel and the scope was then removed and a 24-Icelandic   3-way Callejas catheter was placed, 30 mL sterile water placed in the balloon,   irrigated to clear using a Milton syringe and hooked up to continuous bladder   irrigation.  The patient was then awoken from anesthesia and taken to the   recovery room in stable condition.    COMPLICATIONS:  None.    ESTIMATED BLOOD LOSS:  150 mL    SPECIMENS:  TURP chips.    DRAINS:  A 24-Icelandic 3-way Callejas catheter.       ____________________________________     MD CATHERINE Calderon / BASSAM    DD:  04/08/2019 14:05:26  DT:  04/08/2019 15:27:05    D#:  [id number]  Job#:  [id number]

## 2019-04-08 NOTE — ANESTHESIA PREPROCEDURE EVALUATION
Relevant Problems   (+) Type 2 diabetes mellitus with hyperglycemia, with long-term current use of insulin (HCC)       Physical Exam    Airway   Mallampati: II  TM distance: >3 FB  Neck ROM: full       Cardiovascular - normal exam  Rhythm: regular  Rate: normal  (-) murmur     Dental - normal exam         Pulmonary - normal exam  Breath sounds clear to auscultation     Abdominal    Neurological - normal exam                 Anesthesia Plan    ASA 4 - emergent   ASA physical status emergent criteria: compromised vital organ, limb or tissue    Plan - general       Airway plan will be LMA        Induction: intravenous    Postoperative Plan: Postoperative administration of opioids is intended.        Informed Consent:    Anesthetic plan and risks discussed with patient.    Use of blood products discussed with: patient whom.

## 2019-04-08 NOTE — ANESTHESIA POSTPROCEDURE EVALUATION
Patient: Alec Howell    Procedure Summary     Date:  04/08/19 Room / Location:  [place] [address] / SURGERY [facility]    Anesthesia Start:  1157 Anesthesia Stop:  1356    Procedures:       CYSTOSCOPY (Bladder)      CIRCUMCISION, ADULT (Penis)      TURP (TRANSURETHRAL RESECTION OF PROSTATE) (Urethra) Diagnosis:  (BENIGN PROSTATIC HYPERPLASIA; PHIMOSIS)    Surgeon:  Celestino Solano M.D. Responsible Provider:  Westley Mcbride M.D.    Anesthesia Type:  general ASA Status:  4 - Emergent          Final Anesthesia Type: general  Last vitals  BP   Blood Pressure : 141/85    Temp   36.7 °C (98.1 °F)    Pulse   Pulse: 100   Resp   18    SpO2   98 %      Anesthesia Post Evaluation    Patient participation: complete - patient participated  Level of consciousness: sleepy but conscious  Pain score: 1    Airway patency: patent  Anesthetic complications: no  Cardiovascular status: adequate and hemodynamically stable  Respiratory status: acceptable  Hydration status: acceptable    PONV: none           Nurse Pain Score: 0 (NPRS)

## 2019-04-08 NOTE — ANESTHESIA PROCEDURE NOTES
Airway  Date/Time: 4/8/2019 12:03 PM  Performed by: GUSTAVO CHEN  Authorized by: GUSTAVO CHEN     Location:  OR  Urgency:  Elective  Indications for Airway Management:  Anesthesia  Spontaneous Ventilation: absent    Sedation Level:  Deep  Preoxygenated: Yes    Final Airway Type:  Supraglottic airway  Final Supraglottic Airway:  Standard LMA  SGA Size:  4  Number of Attempts at Approach:  1

## 2019-04-08 NOTE — OR NURSING
fsbs 427 called OR spoke with Dr Solano, who relayed the message to Dr Mcbride, Dr Mcbride to call this rn back with orders

## 2019-04-09 VITALS
HEIGHT: 70 IN | SYSTOLIC BLOOD PRESSURE: 105 MMHG | HEART RATE: 104 BPM | WEIGHT: 165.34 LBS | DIASTOLIC BLOOD PRESSURE: 58 MMHG | BODY MASS INDEX: 23.67 KG/M2 | RESPIRATION RATE: 20 BRPM | OXYGEN SATURATION: 95 % | TEMPERATURE: 98.5 F

## 2019-04-09 LAB
ANION GAP SERPL CALC-SCNC: 7 MMOL/L (ref 0–11.9)
BUN SERPL-MCNC: 25 MG/DL (ref 8–22)
CALCIUM SERPL-MCNC: 8.7 MG/DL (ref 8.5–10.5)
CHLORIDE SERPL-SCNC: 102 MMOL/L (ref 96–112)
CO2 SERPL-SCNC: 24 MMOL/L (ref 20–33)
CREAT SERPL-MCNC: 1.07 MG/DL (ref 0.5–1.4)
ERYTHROCYTE [DISTWIDTH] IN BLOOD BY AUTOMATED COUNT: 39.8 FL (ref 35.9–50)
GLUCOSE BLD-MCNC: 220 MG/DL (ref 65–99)
GLUCOSE BLD-MCNC: 379 MG/DL (ref 65–99)
GLUCOSE SERPL-MCNC: 258 MG/DL (ref 65–99)
HCT VFR BLD AUTO: 38.7 % (ref 42–52)
HGB BLD-MCNC: 12.8 G/DL (ref 14–18)
MCH RBC QN AUTO: 30 PG (ref 27–33)
MCHC RBC AUTO-ENTMCNC: 33.1 G/DL (ref 33.7–35.3)
MCV RBC AUTO: 90.6 FL (ref 81.4–97.8)
PLATELET # BLD AUTO: 267 K/UL (ref 164–446)
PMV BLD AUTO: 10.8 FL (ref 9–12.9)
POTASSIUM SERPL-SCNC: 4.6 MMOL/L (ref 3.6–5.5)
RBC # BLD AUTO: 4.27 M/UL (ref 4.7–6.1)
SODIUM SERPL-SCNC: 133 MMOL/L (ref 135–145)
WBC # BLD AUTO: 13.2 K/UL (ref 4.8–10.8)

## 2019-04-09 PROCEDURE — A9270 NON-COVERED ITEM OR SERVICE: HCPCS | Performed by: UROLOGY

## 2019-04-09 PROCEDURE — 36415 COLL VENOUS BLD VENIPUNCTURE: CPT

## 2019-04-09 PROCEDURE — 82962 GLUCOSE BLOOD TEST: CPT

## 2019-04-09 PROCEDURE — 80048 BASIC METABOLIC PNL TOTAL CA: CPT

## 2019-04-09 PROCEDURE — G0378 HOSPITAL OBSERVATION PER HR: HCPCS

## 2019-04-09 PROCEDURE — 700102 HCHG RX REV CODE 250 W/ 637 OVERRIDE(OP): Performed by: UROLOGY

## 2019-04-09 PROCEDURE — 85027 COMPLETE CBC AUTOMATED: CPT

## 2019-04-09 RX ADMIN — SULFAMETHOXAZOLE AND TRIMETHOPRIM 1 TABLET: 800; 160 TABLET ORAL at 05:06

## 2019-04-09 RX ADMIN — SITAGLIPTIN 100 MG: 100 TABLET, FILM COATED ORAL at 05:06

## 2019-04-09 RX ADMIN — INSULIN HUMAN 2 UNITS: 100 INJECTION, SOLUTION PARENTERAL at 08:07

## 2019-04-09 RX ADMIN — TAMSULOSIN HYDROCHLORIDE 0.4 MG: 0.4 CAPSULE ORAL at 05:06

## 2019-04-09 RX ADMIN — INSULIN HUMAN 5 UNITS: 100 INJECTION, SOLUTION PARENTERAL at 12:19

## 2019-04-09 RX ADMIN — OXYCODONE HYDROCHLORIDE AND ACETAMINOPHEN 1 TABLET: 5; 325 TABLET ORAL at 11:02

## 2019-04-09 NOTE — CARE PLAN
Problem: Urinary Elimination:  Goal: Ability to reestablish a normal urinary elimination pattern will improve  Outcome: PROGRESSING AS EXPECTED  Patient questioning CBI bags on IV pole. Writer oriented patient to purpose of CBI and planned de león removal tomorrow morning. Patient verbalized understanding of same.

## 2019-04-09 NOTE — DISCHARGE PLANNING
Anticipated Discharge Disposition: Home with HHC.    Action: RN informed MD GARY has provided [facility] Order for additional support at home, per patient and patient's family request.    FEW met with patient and patient's son to discuss options for HHC.  Patient's choice is [facility], choice form faxed to [facility].    Barriers to Discharge: None.    Plan: Home with HHC, pending acceptance.

## 2019-04-09 NOTE — DISCHARGE SUMMARY
Discharge Summary    CHIEF COMPLAINT ON ADMISSION  Phimosis, BPH with Urinary retention    Reason for Admission  BENIGN PROSTATIC HYPERPLASIA     Admission Date  4/8/2019    CODE STATUS  Full Code    HPI & HOSPITAL COURSE  This is a 78 y.o. male here with phimosis, BPH with urinary retention who is now S/P circumcision and transurethral resection of the prostate 4/9.  He has done well following the procedure.  He has had some pain, but it has been minimal and well controlled.  Callejas catheter has been draining clear, no clots.  Tolerating diet, denies fevers, chills, N/V.  He did have some minimal bleeding at circumcision site, but has stopped.  Dressing is dry and intact.  At this point he is stable for dicsharge and will plan for a close outpatient follow up.       Therefore, he is discharged in good and stable condition to home with close outpatient follow-up.    The patient recovered much more quickly than anticipated on admission.    Discharge Date  04/09/19    FOLLOW UP ITEMS POST DISCHARGE  Post op with Dr. Solano in 1 month    DISCHARGE DIAGNOSES  Active Problems:    Type 2 diabetes mellitus with hyperglycemia, with long-term current use of insulin ([facility]) POA: Yes    BPH (benign prostatic hyperplasia) POA: Yes    Urinary retention POA: Yes  Resolved Problems:    * No resolved hospital problems. *      FOLLOW UP  Post op with Dr. Solano in 1 month    MEDICATIONS ON DISCHARGE     Medication List      CONTINUE taking these medications      Instructions   JANUVIA 100 MG Tabs  Generic drug:  SITagliptin   Take 100 mg by mouth every morning.  Dose:  100 mg     sulfamethoxazole-trimethoprim 800-160 MG tablet  Commonly known as:  BACTRIM DS   Take 1 Tab by mouth 2 times a day. Start date: 4/5/2019 for 7 days  Dose:  1 Tab     tamsulosin 0.4 MG capsule  Commonly known as:  FLOMAX   Take 0.4 mg by mouth every morning.  Dose:  0.4 mg        STOP taking these medications    oxybutynin SR 10 MG CR tablet  Commonly known as:   DITROPAN-XL            Allergies  No Known Allergies    DIET  Orders Placed This Encounter   Procedures   • Diet Order Regular, Diabetic     Standing Status:   Standing     Number of Occurrences:   1     Order Specific Question:   Diet:     Answer:   Regular [1]     Order Specific Question:   Diet:     Answer:   Diabetic [3]       ACTIVITY  Light duty.  10-lb lifting restriction    CONSULTATIONS  None    PROCEDURES   circumcision and transurethral resection of the prostate     LABORATORY  Lab Results   Component Value Date    SODIUM 133 (L) 04/09/2019    POTASSIUM 4.6 04/09/2019    CHLORIDE 102 04/09/2019    CO2 24 04/09/2019    GLUCOSE 258 (H) 04/09/2019    BUN 25 (H) 04/09/2019    CREATININE 1.07 04/09/2019        Lab Results   Component Value Date    WBC 13.2 (H) 04/09/2019    HEMOGLOBIN 12.8 (L) 04/09/2019    HEMATOCRIT 38.7 (L) 04/09/2019    PLATELETCT 267 04/09/2019        Total time of the discharge process exceeds 31 minutes.

## 2019-04-09 NOTE — DISCHARGE INSTRUCTIONS
Discharge Instructions    Discharged to home by car with relative. Discharged via wheelchair, hospital escort: Yes.  Special equipment needed: Not Applicable    Be sure to schedule a follow-up appointment with your primary care doctor or any specialists as instructed.     Discharge Plan:   Diet Plan: Discussed  Activity Level: Discussed  Smoking Cessation Offered: Patient Refused  Confirmed Follow up Appointment: Patient to Call and Schedule Appointment  Confirmed Symptoms Management: Discussed  Medication Reconciliation Updated: Yes  Influenza Vaccine Indication: Not indicated: Previously immunized this influenza season and > 8 years of age    I understand that a diet low in cholesterol, fat, and sodium is recommended for good health. Unless I have been given specific instructions below for another diet, I accept this instruction as my diet prescription.   Other diet: Regular    Special Instructions: None    · Is patient discharged on Warfarin / Coumadin?   No     Transurethral Resection of the Prostate, Care After  Refer to this sheet in the next few weeks. These instructions provide you with information on caring for yourself after your procedure. Your caregiver also may give you specific instructions. Your treatment has been planned according to current medical practices, but complications sometimes occur. Call your caregiver if you have any problems or questions after your procedure.  HOME CARE INSTRUCTIONS   Recovery can take 4-6 weeks. Avoid alcohol, caffeinated drinks, and spicy foods for 2 weeks after your procedure. Drink enough fluids to keep your urine clear or pale yellow. Urinate as soon as you feel the urge to do so. Do not try to hold your urine for long periods of time.  During recovery you may experience pain caused by bladder spasms, which result in a very intense urge to urinate. Take all medicines as directed by your caregiver, including medicines for pain. Try to limit the amount of pain  medicines you take because it can cause constipation. If you do become constipated, do not strain to move your bowels. Straining can increase bleeding. Constipation can be minimized by increasing the amount fluids and fiber in your diet. Your caregiver also may prescribe a stool softener.  Do not lift heavy objects (more than 5 lb [2.25 kg]) or perform exercises that cause you to strain for at least 1 month after your procedure. When sitting, you may want to sit in a soft chair or use a cushion. For the first 10 days after your procedure, avoid the following activities:  · Running.  · Strenuous work.  · Long walks.  · Riding in a car for extended periods.  · Sex.  SEEK MEDICAL CARE IF:  · You have difficulty urinating.  · You have blood in your urine that does not go away after you rest or increase your fluid intake.  · You have swelling in your penis or scrotum.  SEEK IMMEDIATE MEDICAL CARE IF:   · You are suddenly unable to urinate.  · You notice blood clots in your urine.  · You have chills.  · You have a fever.  · You have pain in your back or lower abdomen.  · You have pain or swelling in your legs.  MAKE SURE YOU:   · Understand these instructions.  · Will watch your condition.  · Will get help right away if you are not doing well or get worse.     This information is not intended to replace advice given to you by your health care provider. Make sure you discuss any questions you have with your health care provider.     Document Released: 12/18/2006 Document Revised: 01/08/2016 Document Reviewed: 01/25/2013  [employer] Interactive Patient Education ©2016 [employer]      Circumcision, Adult, Care After  These instructions give you information on caring for yourself after your procedure. Your doctor may also give you more specific instructions. Call your doctor if you have any problems or questions after your procedure.  HOME CARE  · Only take medicine as told by your doctor.  · Any bandages (dressings) should  stay on for at least 24 hours.  · You may take the bandages off at night to let air get to the area where your doctor made a cut (incision site). Once a scab forms over the cut, you will not need to use bandages.  · Carefully remove the bandage if it gets dirty. Apply medicated cream to the cut. Carefully put a new bandage on if a scab has not formed.  · Do not have sex until your doctor says it is okay.  · Do not get your cut wet for 24 hours or as told by your doctor.  · You may take a sponge bath. Clean around the cut gently with mild soap and water.  · You may take a shower after 24 hours or as told by your doctor. Do not take a tub bath. After you shower, gently pat your cut dry. Do not rub it.  · Avoid heavy lifting.  · Avoid contact sports, biking, or swimming until you have healed. This usually takes 10-14 days.  GET HELP IF:  · You have pain that does not go away after you take medicine for it.  · You have puffiness (swelling) or redness that is unexpected.  · You have a fever.  GET HELP RIGHT AWAY IF:   · You cannot pee (urinate).  · You have pain when you pee.  · Your pain is not helped by medicines.  · There is redness, puffiness, and soreness spreading up the shaft of your penis, your thighs, or your lower belly (abdomen).  · There is yellowish-white fluid (pus) coming from your cut.  · You have bleeding that does not stop when you press on it.  MAKE SURE YOU:  · Understand these instructions.  · Will watch your condition.  · Will get help right away if you are not doing well or get worse.     This information is not intended to replace advice given to you by your health care provider. Make sure you discuss any questions you have with your health care provider.     Document Released: 06/05/2009 Document Revised: 01/08/2016 Document Reviewed: 08/14/2014  Elsevier Interactive Patient Education ©2016 [employer]      Depression / Suicide Risk    As you are discharged from this [facility], it  is important to learn how to keep safe from harming yourself.    Recognize the warning signs:  · Abrupt changes in personality, positive or negative- including increase in energy   · Giving away possessions  · Change in eating patterns- significant weight changes-  positive or negative  · Change in sleeping patterns- unable to sleep or sleeping all the time   · Unwillingness or inability to communicate  · Depression  · Unusual sadness, discouragement and loneliness  · Talk of wanting to die  · Neglect of personal appearance   · Rebelliousness- reckless behavior  · Withdrawal from people/activities they love  · Confusion- inability to concentrate     If you or a loved one observes any of these behaviors or has concerns about self-harm, here's what you can do:  · Talk about it- your feelings and reasons for harming yourself  · Remove any means that you might use to hurt yourself (examples: pills, rope, extension cords, firearm)  · Get professional help from the community (Mental Health, Substance Abuse, psychological counseling)  · Do not be alone:Call your Safe Contact- someone whom you trust who will be there for you.  · Call your local CRISIS HOTLINE 838-1769 or 171-190-3275  · Call your local Children's Mobile Crisis Response Team [place] (106) 397-3691 or www.[employer]  · Call the toll free National Suicide Prevention Hotlines   · National Suicide Prevention Lifeline 263-071-MCQS (9074)  · National Hope Line Network 800-SUICIDE (996-0328)

## 2019-04-09 NOTE — DISCHARGE PLANNING
Received Choice form at [phone]  Agency/Facility Name: [facility]   Referral sent per Choice form @ [phone]

## 2019-04-09 NOTE — DISCHARGE PLANNING
Agency[facility]Facility Name: [facility]    Spoke To: Bella  Outcome: Patient accepted. Bella to visit with patient today. Per Bella, there may be a delay in start date as [facility] currently has limited availability.     GARY hubbard

## 2019-04-09 NOTE — CARE PLAN
Problem: Pain Management  Goal: Pain level will decrease to patient's comfort goal  Outcome: PROGRESSING AS EXPECTED  Patient having complaints of pain 3/10 in head and penis, medicated per MAR.      Problem: Urinary Elimination:  Goal: Ability to reestablish a normal urinary elimination pattern will improve  Outcome: PROGRESSING AS EXPECTED  Callejas removed, patient voiding without difficulty.

## 2019-04-09 NOTE — CARE PLAN
"Problem: Safety  Goal: Will remain free from injury  Outcome: PROGRESSING AS EXPECTED  Patient returned from OR at 1545. Alert and oriented x 4 but forgetful and required a lot of reinforcement not to get up without staff, bed alarm applied. Apical strong and regular, no edema. Lungs clear to bases bilaterally, denies cough, SOB and chest pain. Oxygen via mask at 10 L/min per ERAS protocol. Abdomen soft, no bowel sound, denies flatus post op. CBI titrated for clear tea/light sang colored urine. Patient reports he as not had a BM in \"a couple of days.\" Regularly scheduled PEG administered. NS infusing at 70 mls/hr to 18g to right forearm.    Patient's son present for admission and reports that patient has had difficulty in taking care of himself progressively over the last year. Patient reportedly stopped managing diabetes appropriately within that time frame as well. Patient reports that he did not like \"poking my finger so I stopped doing it.\" LSW consult entered per protocol for discharge planning.     Patient resting in bed, denies questions/concerns. Call bell in reach. Alarm bed on.      "

## 2019-04-09 NOTE — PROGRESS NOTES
Received report from evening RN.  Assumed care of patient.  Patient A&Ox4.  No complaints of pain at this time.  No nausea or vomiting.  Tolerating regular diet.  Dressing to penis, CDI.  CBI in place, draining clear, light pink urine.  Ambulating with steady gait and SBA.  +flatus, +BM.  Son at bedside.  Plan of care discussed.  Call light within reach.  Bed in lowest position.

## 2019-04-10 NOTE — PROGRESS NOTES
Patient voided without difficulty post de león removal. Patient and son given discharge instructions.  Patient demonstrated understanding.  Dressing on penis was saturated with urine, so RN changed dressing and provided patient with dressing supplies and instructions.  Home health RN to see patient on Saturday.  PIV removed.  Patient discharged home with son via wheelchair.

## 2020-11-25 ENCOUNTER — HOSPITAL ENCOUNTER (OUTPATIENT)
Facility: MEDICAL CENTER | Age: 80
End: 2020-11-25
Attending: PHYSICIAN ASSISTANT
Payer: MEDICARE

## 2020-11-25 PROCEDURE — 87086 URINE CULTURE/COLONY COUNT: CPT

## 2020-11-28 LAB
BACTERIA UR CULT: NORMAL
SIGNIFICANT IND 70042: NORMAL
SITE SITE: NORMAL
SOURCE SOURCE: NORMAL

## 2021-01-04 NOTE — FACE TO FACE
Face to Face Supporting Documentation - Home Health    The encounter with this patient was in whole or in part the primary reason for home health admission.    Date of encounter:   Patient:                    MRN:                       YOB: 2017  Alec Howell  [id number]  1940     Home health to see patient for:  Home health aide    Skilled need for:  Medication Management circumcision care, ensure takes diabetes meds    Skilled nursing interventions to include:  Wound Care    Homebound status evidenced by:  Needs the assistance of another person in order to leave the home. Leaving home requires a considerable and taxing effort. There is a normal inability to leave the home.    Community Physician to provide follow up care: Weston Purcell M.D.     Optional Interventions? No      I certify the face to face encounter for this home health care referral meets the CMS requirements and the encounter/clinical assessment with the patient was, in whole, or in part, for the medical condition(s) listed above, which is the primary reason for home health care. Based on my clinical findings: the service(s) are medically necessary, support the need for home health care, and the homebound criteria are met.  I certify that this patient has had a face to face encounter by myself.  EARLE Matson. - NPI: [id number]     Implemented All Fall with Harm Risk Interventions:  [place] to call system. Call bell, personal items and telephone within reach. Instruct patient to call for assistance. Room bathroom lighting operational. Non-slip footwear when patient is off stretcher. Physically safe environment: no spills, clutter or unnecessary equipment. Stretcher in lowest position, wheels locked, appropriate side rails in place. Provide visual cue, wrist band, yellow gown, etc. Monitor gait and stability. Monitor for mental status changes and reorient to person, place, and time. Review medications for side effects contributing to fall risk. Reinforce activity limits and safety measures with patient and family. Provide visual clues: red socks.

## 2021-12-29 ENCOUNTER — HOSPITAL ENCOUNTER (OUTPATIENT)
Dept: RADIOLOGY | Facility: MEDICAL CENTER | Age: 81
End: 2021-12-29
Attending: PHYSICIAN ASSISTANT
Payer: MEDICARE

## 2021-12-29 ENCOUNTER — HOSPITAL ENCOUNTER (OUTPATIENT)
Dept: RADIOLOGY | Facility: MEDICAL CENTER | Age: 81
End: 2021-12-29
Payer: MEDICARE

## 2022-09-12 ENCOUNTER — HOSPITAL ENCOUNTER (OUTPATIENT)
Dept: RADIOLOGY | Facility: MEDICAL CENTER | Age: 82
End: 2022-09-12

## 2022-09-12 ENCOUNTER — HOSPITAL ENCOUNTER (INPATIENT)
Facility: MEDICAL CENTER | Age: 82
LOS: 15 days | DRG: 025 | End: 2022-09-27
Attending: EMERGENCY MEDICINE | Admitting: INTERNAL MEDICINE
Payer: MEDICARE

## 2022-09-12 ENCOUNTER — APPOINTMENT (OUTPATIENT)
Dept: RADIOLOGY | Facility: MEDICAL CENTER | Age: 82
DRG: 025 | End: 2022-09-12
Attending: EMERGENCY MEDICINE
Payer: MEDICARE

## 2022-09-12 DIAGNOSIS — E43 SEVERE MALNUTRITION (HCC): ICD-10-CM

## 2022-09-12 DIAGNOSIS — S06.5XAA SDH (SUBDURAL HEMATOMA) (HCC): ICD-10-CM

## 2022-09-12 DIAGNOSIS — S06.5XAA SUBDURAL HEMATOMA (HCC): ICD-10-CM

## 2022-09-12 DIAGNOSIS — G93.40 ENCEPHALOPATHY: ICD-10-CM

## 2022-09-12 DIAGNOSIS — E11.00 TYPE 2 DIABETES MELLITUS WITH HYPEROSMOLARITY WITHOUT COMA, WITHOUT LONG-TERM CURRENT USE OF INSULIN (HCC): ICD-10-CM

## 2022-09-12 PROBLEM — E11.9 TYPE 2 DIABETES MELLITUS (HCC): Status: ACTIVE | Noted: 2019-03-21

## 2022-09-12 PROBLEM — I10 PRIMARY HYPERTENSION: Status: ACTIVE | Noted: 2022-09-12

## 2022-09-12 LAB
ABO GROUP BLD: NORMAL
ALBUMIN SERPL BCP-MCNC: 4 G/DL (ref 3.2–4.9)
ALBUMIN/GLOB SERPL: 1.5 G/DL
ALP SERPL-CCNC: 74 U/L (ref 30–99)
ALT SERPL-CCNC: 15 U/L (ref 2–50)
ANION GAP SERPL CALC-SCNC: 7 MMOL/L (ref 7–16)
APTT PPP: 33.8 SEC (ref 24.7–36)
AST SERPL-CCNC: 22 U/L (ref 12–45)
BILIRUB SERPL-MCNC: 0.3 MG/DL (ref 0.1–1.5)
BLD GP AB SCN SERPL QL: NORMAL
BUN SERPL-MCNC: 18 MG/DL (ref 8–22)
CALCIUM SERPL-MCNC: 8.6 MG/DL (ref 8.5–10.5)
CFT BLD TEG: 5.8 MIN (ref 4.6–9.1)
CFT P HPASE BLD TEG: 5.7 MIN (ref 4.3–8.3)
CHLORIDE SERPL-SCNC: 103 MMOL/L (ref 96–112)
CLOT ANGLE BLD TEG: 75.7 DEGREES (ref 63–78)
CLOT LYSIS 30M P MA LENFR BLD TEG: 0.3 % (ref 0–2.6)
CO2 SERPL-SCNC: 27 MMOL/L (ref 20–33)
CREAT SERPL-MCNC: 0.97 MG/DL (ref 0.5–1.4)
CT.EXTRINSIC BLD ROTEM: 1 MIN (ref 0.8–2.1)
ERYTHROCYTE [DISTWIDTH] IN BLOOD BY AUTOMATED COUNT: 42 FL (ref 35.9–50)
ETHANOL BLD-MCNC: <10.1 MG/DL
GFR SERPLBLD CREATININE-BSD FMLA CKD-EPI: 79 ML/MIN/1.73 M 2
GLOBULIN SER CALC-MCNC: 2.7 G/DL (ref 1.9–3.5)
GLUCOSE SERPL-MCNC: 186 MG/DL (ref 65–99)
HCT VFR BLD AUTO: 33.4 % (ref 42–52)
HGB BLD-MCNC: 11.4 G/DL (ref 14–18)
INR PPP: 1.08 (ref 0.87–1.13)
MCF BLD TEG: 68.4 MM (ref 52–69)
MCF.PLATELET INHIB BLD ROTEM: 34.9 MM (ref 15–32)
MCH RBC QN AUTO: 31.2 PG (ref 27–33)
MCHC RBC AUTO-ENTMCNC: 34.1 G/DL (ref 33.7–35.3)
MCV RBC AUTO: 91.5 FL (ref 81.4–97.8)
PA AA BLD-ACNC: 8.2 % (ref 0–11)
PA ADP BLD-ACNC: 9.4 % (ref 0–17)
PLATELET # BLD AUTO: 349 K/UL (ref 164–446)
PMV BLD AUTO: 10.6 FL (ref 9–12.9)
POTASSIUM SERPL-SCNC: 4.6 MMOL/L (ref 3.6–5.5)
PROT SERPL-MCNC: 6.7 G/DL (ref 6–8.2)
PROTHROMBIN TIME: 13.9 SEC (ref 12–14.6)
RBC # BLD AUTO: 3.65 M/UL (ref 4.7–6.1)
RH BLD: NORMAL
SODIUM SERPL-SCNC: 137 MMOL/L (ref 135–145)
TEG ALGORITHM TGALG: ABNORMAL
WBC # BLD AUTO: 9.1 K/UL (ref 4.8–10.8)

## 2022-09-12 PROCEDURE — 86850 RBC ANTIBODY SCREEN: CPT

## 2022-09-12 PROCEDURE — 770022 HCHG ROOM/CARE - ICU (200)

## 2022-09-12 PROCEDURE — 85027 COMPLETE CBC AUTOMATED: CPT

## 2022-09-12 PROCEDURE — 36415 COLL VENOUS BLD VENIPUNCTURE: CPT

## 2022-09-12 PROCEDURE — 71045 X-RAY EXAM CHEST 1 VIEW: CPT

## 2022-09-12 PROCEDURE — 85384 FIBRINOGEN ACTIVITY: CPT

## 2022-09-12 PROCEDURE — 82077 ASSAY SPEC XCP UR&BREATH IA: CPT

## 2022-09-12 PROCEDURE — A9270 NON-COVERED ITEM OR SERVICE: HCPCS | Performed by: INTERNAL MEDICINE

## 2022-09-12 PROCEDURE — 99291 CRITICAL CARE FIRST HOUR: CPT

## 2022-09-12 PROCEDURE — 700102 HCHG RX REV CODE 250 W/ 637 OVERRIDE(OP): Performed by: NURSE PRACTITIONER

## 2022-09-12 PROCEDURE — A9270 NON-COVERED ITEM OR SERVICE: HCPCS | Performed by: NURSE PRACTITIONER

## 2022-09-12 PROCEDURE — 85730 THROMBOPLASTIN TIME PARTIAL: CPT

## 2022-09-12 PROCEDURE — G0390 TRAUMA RESPONS W/HOSP CRITI: HCPCS

## 2022-09-12 PROCEDURE — 82962 GLUCOSE BLOOD TEST: CPT

## 2022-09-12 PROCEDURE — 700102 HCHG RX REV CODE 250 W/ 637 OVERRIDE(OP): Performed by: INTERNAL MEDICINE

## 2022-09-12 PROCEDURE — 85347 COAGULATION TIME ACTIVATED: CPT

## 2022-09-12 PROCEDURE — 85610 PROTHROMBIN TIME: CPT

## 2022-09-12 PROCEDURE — 99291 CRITICAL CARE FIRST HOUR: CPT | Performed by: INTERNAL MEDICINE

## 2022-09-12 PROCEDURE — 80053 COMPREHEN METABOLIC PANEL: CPT

## 2022-09-12 PROCEDURE — 86901 BLOOD TYPING SEROLOGIC RH(D): CPT

## 2022-09-12 PROCEDURE — 86900 BLOOD TYPING SEROLOGIC ABO: CPT

## 2022-09-12 PROCEDURE — 85576 BLOOD PLATELET AGGREGATION: CPT | Mod: 91

## 2022-09-12 RX ORDER — ZONISAMIDE 50 MG/1
100 CAPSULE ORAL
COMMUNITY

## 2022-09-12 RX ORDER — ZONISAMIDE 50 MG/1
100 CAPSULE ORAL
Status: DISCONTINUED | OUTPATIENT
Start: 2022-09-12 | End: 2022-09-27 | Stop reason: HOSPADM

## 2022-09-12 RX ORDER — POLYETHYLENE GLYCOL 3350 17 G/17G
1 POWDER, FOR SOLUTION ORAL
Status: DISCONTINUED | OUTPATIENT
Start: 2022-09-12 | End: 2022-09-27 | Stop reason: HOSPADM

## 2022-09-12 RX ORDER — ONDANSETRON 4 MG/1
4 TABLET, ORALLY DISINTEGRATING ORAL EVERY 4 HOURS PRN
Status: DISCONTINUED | OUTPATIENT
Start: 2022-09-12 | End: 2022-09-27 | Stop reason: HOSPADM

## 2022-09-12 RX ORDER — ACETAMINOPHEN 325 MG/1
650 TABLET ORAL EVERY 4 HOURS PRN
Status: DISCONTINUED | OUTPATIENT
Start: 2022-09-12 | End: 2022-09-27 | Stop reason: HOSPADM

## 2022-09-12 RX ORDER — AMOXICILLIN 250 MG
2 CAPSULE ORAL 2 TIMES DAILY
Status: DISCONTINUED | OUTPATIENT
Start: 2022-09-12 | End: 2022-09-27 | Stop reason: HOSPADM

## 2022-09-12 RX ORDER — ATORVASTATIN CALCIUM 40 MG/1
40 TABLET, FILM COATED ORAL NIGHTLY
Status: DISCONTINUED | OUTPATIENT
Start: 2022-09-12 | End: 2022-09-27 | Stop reason: HOSPADM

## 2022-09-12 RX ORDER — AMLODIPINE BESYLATE 10 MG/1
10 TABLET ORAL DAILY
COMMUNITY

## 2022-09-12 RX ORDER — ATORVASTATIN CALCIUM 40 MG/1
40 TABLET, FILM COATED ORAL NIGHTLY
COMMUNITY

## 2022-09-12 RX ORDER — GABAPENTIN 100 MG/1
100 CAPSULE ORAL 3 TIMES DAILY
Status: ON HOLD | COMMUNITY
End: 2022-09-27 | Stop reason: SDUPTHER

## 2022-09-12 RX ORDER — ONDANSETRON 2 MG/ML
4 INJECTION INTRAMUSCULAR; INTRAVENOUS EVERY 4 HOURS PRN
Status: DISCONTINUED | OUTPATIENT
Start: 2022-09-12 | End: 2022-09-27 | Stop reason: HOSPADM

## 2022-09-12 RX ORDER — LABETALOL HYDROCHLORIDE 5 MG/ML
10-20 INJECTION, SOLUTION INTRAVENOUS EVERY 4 HOURS PRN
Status: DISCONTINUED | OUTPATIENT
Start: 2022-09-12 | End: 2022-09-27 | Stop reason: HOSPADM

## 2022-09-12 RX ORDER — AMLODIPINE BESYLATE 5 MG/1
10 TABLET ORAL DAILY
Status: DISCONTINUED | OUTPATIENT
Start: 2022-09-12 | End: 2022-09-27 | Stop reason: HOSPADM

## 2022-09-12 RX ORDER — BISACODYL 10 MG
10 SUPPOSITORY, RECTAL RECTAL
Status: DISCONTINUED | OUTPATIENT
Start: 2022-09-12 | End: 2022-09-27 | Stop reason: HOSPADM

## 2022-09-12 RX ORDER — HYDRALAZINE HYDROCHLORIDE 20 MG/ML
20 INJECTION INTRAMUSCULAR; INTRAVENOUS EVERY 4 HOURS PRN
Status: DISCONTINUED | OUTPATIENT
Start: 2022-09-12 | End: 2022-09-27 | Stop reason: HOSPADM

## 2022-09-12 RX ORDER — GABAPENTIN 100 MG/1
100 CAPSULE ORAL 3 TIMES DAILY
Status: DISCONTINUED | OUTPATIENT
Start: 2022-09-12 | End: 2022-09-17

## 2022-09-12 RX ADMIN — ACETAMINOPHEN 650 MG: 325 TABLET, FILM COATED ORAL at 22:17

## 2022-09-12 RX ADMIN — DOCUSATE SODIUM 50 MG AND SENNOSIDES 8.6 MG 2 TABLET: 8.6; 5 TABLET, FILM COATED ORAL at 18:00

## 2022-09-12 RX ADMIN — INSULIN HUMAN 3 UNITS: 100 INJECTION, SOLUTION PARENTERAL at 20:58

## 2022-09-12 RX ADMIN — AMLODIPINE BESYLATE 10 MG: 10 TABLET ORAL at 16:55

## 2022-09-12 RX ADMIN — ATORVASTATIN CALCIUM 40 MG: 40 TABLET, FILM COATED ORAL at 20:51

## 2022-09-12 RX ADMIN — ZONISAMIDE 100 MG: 50 CAPSULE ORAL at 20:51

## 2022-09-12 RX ADMIN — GABAPENTIN 100 MG: 100 CAPSULE ORAL at 18:13

## 2022-09-12 ASSESSMENT — COGNITIVE AND FUNCTIONAL STATUS - GENERAL
EATING MEALS: A LITTLE
SUGGESTED CMS G CODE MODIFIER DAILY ACTIVITY: CK
TOILETING: A LITTLE
MOVING FROM LYING ON BACK TO SITTING ON SIDE OF FLAT BED: A LITTLE
MOVING TO AND FROM BED TO CHAIR: A LITTLE
HELP NEEDED FOR BATHING: A LITTLE
CLIMB 3 TO 5 STEPS WITH RAILING: A LITTLE
MOBILITY SCORE: 18
DAILY ACTIVITIY SCORE: 18
DRESSING REGULAR LOWER BODY CLOTHING: A LITTLE
SUGGESTED CMS G CODE MODIFIER MOBILITY: CK
STANDING UP FROM CHAIR USING ARMS: A LITTLE
DRESSING REGULAR UPPER BODY CLOTHING: A LITTLE
TURNING FROM BACK TO SIDE WHILE IN FLAT BAD: A LITTLE
WALKING IN HOSPITAL ROOM: A LITTLE
PERSONAL GROOMING: A LITTLE

## 2022-09-12 ASSESSMENT — LIFESTYLE VARIABLES
ON A TYPICAL DAY WHEN YOU DRINK ALCOHOL HOW MANY DRINKS DO YOU HAVE: 0
TOTAL SCORE: 0
TOTAL SCORE: 0
DOES PATIENT WANT TO STOP DRINKING: NO
HOW MANY TIMES IN THE PAST YEAR HAVE YOU HAD 5 OR MORE DRINKS IN A DAY: 0
CONSUMPTION TOTAL: NEGATIVE
EVER FELT BAD OR GUILTY ABOUT YOUR DRINKING: NO
HAVE PEOPLE ANNOYED YOU BY CRITICIZING YOUR DRINKING: NO
TOTAL SCORE: 0
DO YOU DRINK ALCOHOL: NO
TOTAL SCORE: 0
TOTAL SCORE: 0
EVER HAD A DRINK FIRST THING IN THE MORNING TO STEADY YOUR NERVES TO GET RID OF A HANGOVER: NO
HAVE YOU EVER FELT YOU SHOULD CUT DOWN ON YOUR DRINKING: NO
ALCOHOL_USE: NO
HAVE PEOPLE ANNOYED YOU BY CRITICIZING YOUR DRINKING: NO
HAVE YOU EVER FELT YOU SHOULD CUT DOWN ON YOUR DRINKING: NO
TOTAL SCORE: 0
EVER HAD A DRINK FIRST THING IN THE MORNING TO STEADY YOUR NERVES TO GET RID OF A HANGOVER: NO
AVERAGE NUMBER OF DAYS PER WEEK YOU HAVE A DRINK CONTAINING ALCOHOL: 0
CONSUMPTION TOTAL: INCOMPLETE
EVER FELT BAD OR GUILTY ABOUT YOUR DRINKING: NO

## 2022-09-12 ASSESSMENT — ENCOUNTER SYMPTOMS
EYE DISCHARGE: 0
VOMITING: 0
SORE THROAT: 0
EYE PAIN: 0
FEVER: 0
CHILLS: 0
SHORTNESS OF BREATH: 0
EYE REDNESS: 0
HEMOPTYSIS: 0
FALLS: 1
STRIDOR: 0
NAUSEA: 0
PALPITATIONS: 0
HEADACHES: 1
MYALGIAS: 0
SEIZURES: 0
LOSS OF CONSCIOUSNESS: 0
COUGH: 0
BRUISES/BLEEDS EASILY: 0
ABDOMINAL PAIN: 0
HALLUCINATIONS: 0
DIAPHORESIS: 0
NERVOUS/ANXIOUS: 0

## 2022-09-12 ASSESSMENT — PAIN DESCRIPTION - PAIN TYPE: TYPE: CHRONIC PAIN;NEUROPATHIC PAIN

## 2022-09-12 ASSESSMENT — PATIENT HEALTH QUESTIONNAIRE - PHQ9
1. LITTLE INTEREST OR PLEASURE IN DOING THINGS: NOT AT ALL
2. FEELING DOWN, DEPRESSED, IRRITABLE, OR HOPELESS: NOT AT ALL
SUM OF ALL RESPONSES TO PHQ9 QUESTIONS 1 AND 2: 0

## 2022-09-12 ASSESSMENT — FIBROSIS 4 INDEX: FIB4 SCORE: 1.3

## 2022-09-12 NOTE — ED NOTES
Med rec updated and complete. Allergies reviewed . Confirmed name and date of birth with pt.  Pt unable to clarify his medications or strengths. Pt unable to verify last doses taken. Pt reports that he fills at [facility].   Placed call to confirm medications. No diabetic medications on  file. Last known fill > 1 year.  Pt stated he fills at no other pharmacies.      Home pharmacy [facility] [phone]

## 2022-09-12 NOTE — ASSESSMENT & PLAN NOTE
Goal SBP less than 160  Continue amlodipine, 10 mg daily  IVP Antihypertensives as needed to keep within goal of SBP less than 160

## 2022-09-12 NOTE — ASSESSMENT & PLAN NOTE
Right-sided subdural hematoma with mass-effect  -POD #3- right craniotomy with evacuation of subdural hematoma and resection of subdural membrane. EVD placement  -CT head shows pneumocephalus- placed on 100% NRB - for nitrogen washout  -EVD- output- possible removal of drain today 9/17  -Required 1 dose of Labetalol this am to keep SBP within parameter goal of less than 160.   -Receiving PO antihypertensives.  -SCDs only no chem DVT PPX - NSG to determine  -No asa  Maintain eunatremic, euglycemic, normothermic  -Melatonin to help promote sleep

## 2022-09-12 NOTE — ASSESSMENT & PLAN NOTE
-Glycohemoglobin- 8.6  -Sliding scale insulin-   -Lantus added for better control  -Monitor glucose with FSBS and CMP/BMP

## 2022-09-12 NOTE — ED NOTES
Pt transferred from [facility], pt fell off the toilet 2 days prior and presented to the ED in [place] for evaluation of a headache, R shoulder and R hi pain. Pt hit his head during the fall and takes daily ASA. Pt found to have 2.1 cm subdural hematoma in the R frontal area and 1 mm right to left midline shift. Pt arrives AAOx4 with a GCS of 15 and moving all extremities. No obvious trauma noted.

## 2022-09-12 NOTE — H&P
"PULMONARY AND CRITICAL CARE MEDICINE HISTORY AND PHYSICAL EXAMINATION    Date of Admission:  9/12/2022    Admitting Physician:  Dc Leiva MD    Reason for Admission: Subdural hematoma    Chief Complaint: Subdural hematoma    History of Present Illness:    I was kindly asked to see and evaluate Alec Howell, a 82 y.o. male for evaluation and management of the above problem.    This gentleman has a history of DM type II, primary hypertension and BPH.  He tells me that 2 days ago, he slipped while he was trying to sit on his commode and fell striking the right side of his head and right hip.  At no point in time, did this gentleman develop any loss of consciousness.  He lives with his son.  His son was not home at the time of this incident.  It took him a great deal of time to eventually pull himself up from the floor.  He has been having mobility problems involving the right side of his body for some time and these mobility issues predate his fall.  He presented to an outside hospital with a complaint of right hip pain.  CT imaging of his head reveals a right sided subdural hematoma apparently measuring 2.1 cm with a 1 mm right to left midline shift.  Radiographic imaging of his right hip did not reveal any fractures.  Tragically, medical records from the outside hospital that accompanied him to [facility] ED today are \"missing in action\" and not able to be located for my review at the time of this authoring.    Medications Prior to Admission:    No current facility-administered medications on file prior to encounter.     Current Outpatient Medications on File Prior to Encounter   Medication Sig Dispense Refill    amLODIPine (NORVASC) 10 MG Tab Take 10 mg by mouth every day.      atorvastatin (LIPITOR) 40 MG Tab Take 40 mg by mouth every evening.      gabapentin (NEURONTIN) 100 MG Cap Take 100 mg by mouth 3 times a day.      aspirin EC (ECOTRIN) 81 MG Tablet Delayed Response Take 81 mg by mouth " every day.      zonisamide (ZONEGRAN) 50 MG capsule Take 100 mg by mouth at bedtime.         Current Medications:      Current Facility-Administered Medications:     senna-docusate (PERICOLACE or SENOKOT S) 8.6-50 MG per tablet 2 Tablet, 2 Tablet, Oral, BID **AND** polyethylene glycol/lytes (MIRALAX) PACKET 1 Packet, 1 Packet, Oral, QDAY PRN **AND** magnesium hydroxide (MILK OF MAGNESIA) suspension 30 mL, 30 mL, Oral, QDAY PRN **AND** bisacodyl (DULCOLAX) suppository 10 mg, 10 mg, Rectal, QDAY PRN, Dc Leiva M.D.    Respiratory Therapy Consult, , Nebulization, Continuous RT, Dc Leiva M.D.    ondansetron (ZOFRAN) syringe/vial injection 4 mg, 4 mg, Intravenous, Q4HRS PRN, Dc Leiva M.D.    ondansetron (ZOFRAN ODT) dispertab 4 mg, 4 mg, Oral, Q4HRS PRN, Dc Leiva M.D.    amLODIPine (NORVASC) tablet 10 mg, 10 mg, Oral, DAILY, Dc Leiva M.D., 10 mg at 09/12/22 1655    atorvastatin (LIPITOR) tablet 40 mg, 40 mg, Oral, Nightly, Dc Leiva M.D.    gabapentin (NEURONTIN) capsule 100 mg, 100 mg, Oral, TID, Dc Leiva M.D.    zonisamide (ZONEGRAN) capsule 100 mg, 100 mg, Oral, QHS, Dc Leiva M.D.    hydrALAZINE (APRESOLINE) injection 20 mg, 20 mg, Intravenous, Q4HRS PRN, Dc Leiva M.D.    labetalol (NORMODYNE/TRANDATE) injection 10-20 mg, 10-20 mg, Intravenous, Q4HRS PRN, Dc Leiva M.D.    insulin regular (HumuLIN R,NovoLIN R) injection, 2-9 Units, Subcutaneous, 4X/DAY ACHS **AND** POC blood glucose manual result, , , Q AC AND BEDTIME(S) **AND** NOTIFY MD and PharmD, , , Once **AND** Administer 20 grams of glucose (approximately 8 ounces of fruit juice) every 15 minutes PRN FSBG less than 70 mg/dL, , , PRN **AND** dextrose 10 % BOLUS 25 g, 25 g, Intravenous, Q15 MIN PRN, Dc Leiva M.D.    MD Alert...ICU Electrolyte Replacement per Pharmacy, , Other, PHARMACY TO DOSE, Dc Berry  NAUN Erickson    Current Outpatient Medications:     amLODIPine (NORVASC) 10 MG Tab, Take 10 mg by mouth every day., Disp: , Rfl:     atorvastatin (LIPITOR) 40 MG Tab, Take 40 mg by mouth every evening., Disp: , Rfl:     gabapentin (NEURONTIN) 100 MG Cap, Take 100 mg by mouth 3 times a day., Disp: , Rfl:     aspirin EC (ECOTRIN) 81 MG Tablet Delayed Response, Take 81 mg by mouth every day., Disp: , Rfl:     zonisamide (ZONEGRAN) 50 MG capsule, Take 100 mg by mouth at bedtime., Disp: , Rfl:     Allergies:    Patient has no known allergies.    Past Surgical History:    Past Surgical History:   Procedure Laterality Date    [place] TRANSURETHRAL ELEC-SURG PROSTATECTOM  4/8/2019    Procedure: TURP (TRANSURETHRAL RESECTION OF PROSTATE);  Surgeon: Celestino Solano M.D.;  Location: SURGERY [facility];  Service: Urology    CYSTOSCOPY  4/8/2019    Procedure: CYSTOSCOPY;  Surgeon: Celestino Solano M.D.;  Location: SURGERY [facility];  Service: Urology    CIRCUMCISION ADULT  4/8/2019    Procedure: CIRCUMCISION, ADULT;  Surgeon: Celestino Solano M.D.;  Location: SURGERY [facility];  Service: Urology    OTHER CARDIAC SURGERY      Cardiac stent x1       Past Medical History:    Past Medical History:   Diagnosis Date    Diabetes (HCC)     Hypertension        Social History:    Social History     Socioeconomic History    Marital status: Single     Spouse name: Not on file    Number of children: Not on file    Years of education: Not on file    Highest education level: Not on file   Occupational History    Not on file   Tobacco Use    Smoking status: Some Days     Types: Cigars    Smokeless tobacco: Never   Substance and Sexual Activity    Alcohol use: No    Drug use: No    Sexual activity: Not on file   Other Topics Concern    Not on file   Social History Narrative    Not on file     Social Determinants of Health     Financial Resource Strain: Not on file   Food Insecurity: Not on file   Transportation Needs: Not on file  "  Physical Activity: Not on file   Stress: Not on file   Social Connections: Not on file   Intimate Partner Violence: Not on file   Housing Stability: Not on file       Family History:    Family History   Problem Relation Age of Onset    Alzheimer's Disease Mother     No Known Problems Father        Review of System:    Review of Systems   Constitutional:  Negative for chills, diaphoresis and fever.   HENT:  Negative for ear pain, nosebleeds and sore throat.    Eyes:  Negative for pain, discharge and redness.   Respiratory:  Negative for cough, hemoptysis, shortness of breath and stridor.    Cardiovascular:  Negative for chest pain, palpitations and leg swelling.   Gastrointestinal:  Negative for abdominal pain, nausea and vomiting.   Genitourinary:  Negative for dysuria and urgency.   Musculoskeletal:  Positive for joint pain. Negative for myalgias.   Skin:  Negative for rash.   Neurological:  Negative for seizures and loss of consciousness.   Endo/Heme/Allergies:  Does not bruise/bleed easily.   Psychiatric/Behavioral:  Negative for hallucinations. The patient is not nervous/anxious.      Physical Examination:    /65   Pulse 83   Temp 36.8 °C (98.2 °F)   Resp 16   Ht 1.753 m (5' 9\")   Wt 74.8 kg (165 lb)   SpO2 95%   BMI 24.37 kg/m²   Physical Exam  Constitutional:       Appearance: He is not diaphoretic.   HENT:      Head: Normocephalic.      Nose: Nose normal.      Mouth/Throat:      Pharynx: Oropharynx is clear.   Eyes:      Pupils: Pupils are equal, round, and reactive to light.   Cardiovascular:      Comments: Sinus rhythm  Pulmonary:      Breath sounds: No wheezing or rales.   Abdominal:      General: There is no distension.      Tenderness: There is no abdominal tenderness.   Musculoskeletal:      Cervical back: Normal range of motion.      Right lower leg: No edema.      Left lower leg: No edema.   Skin:     General: Skin is warm.      Capillary Refill: Capillary refill takes less than 2 " seconds.   Neurological:      General: No focal deficit present.      Mental Status: He is oriented to person, place, and time.      Cranial Nerves: No cranial nerve deficit.       Laboratory Data:        Recent Labs     09/12/22  1429   WBC 9.1   RBC 3.65*   HEMOGLOBIN 11.4*   HEMATOCRIT 33.4*   MCV 91.5   MCH 31.2   MCHC 34.1   RDW 42.0   PLATELETCT 349   MPV 10.6     Recent Labs     09/12/22  1429   SODIUM 137   POTASSIUM 4.6   CHLORIDE 103   CO2 27   GLUCOSE 186*   BUN 18   CREATININE 0.97   CALCIUM 8.6                   Imaging:    I personally viewed all the available CXR and CT scan images as well as reviewed the radiology interpretation reports.    DX-CHEST-LIMITED (1 VIEW)   Final Result      1.  No acute cardiac or pulmonary abnormalities are identified.      OUTSIDE IMAGES-DX UPPER EXTREMITY, RIGHT   Final Result      [id number] FILM X-RAY   Final Result      OUTSIDE IMAGES-CT HEAD   Final Result          Assessment and Plan:    * Subdural hematoma (HCC)- (present on admission)  Assessment & Plan  Imaging reveals right-sided subdural hematoma with mass-effect  Neuro checks every 2 hours  Dr. Gleason on board with plans for surgical evacuation  Strict blood pressure control with goal SBP less than 160  Check thromboelastogram with platelet mapping    Primary hypertension  Assessment & Plan  Goal SBP less than 160  Continue amlodipine, 10 mg daily    Type 2 diabetes mellitus (HCC)- (present on admission)  Assessment & Plan  Check glycohemoglobin  Sliding scale insulin      This gentleman is critically ill with a right-sided subdural hematoma with mass-effect.  He requires very close neurological monitoring and strict blood pressure control.  I have assessed and reassessed his neurologic status, blood pressure, hemodynamics and cardiovascular status.  He is at increased risk for worsening CNS system dysfunction.    High risk of deterioration and worsening vital organ dysfunction and death without the  above critical care interventions.    The patient is critically ill.  Critical care time = 40 minutes in directly providing and coordinating critical care and extensive data review.  No time overlap and excludes procedures.    Discussed with Dr. Gleason, ER physician, RN    Dc Leiva MD  Pulmonary and Critical Care Medicine

## 2022-09-12 NOTE — ED PROVIDER NOTES
ED Provider Note    Scribed for Luis Goel M.D. by Omar Guallpa. 9/12/2022, 2:25 PM.    Primary care provider: Weston Purcell M.D.  Means of arrival: EMS  History obtained from: Patient and EMS  History limited by: None     CHIEF COMPLAINT  Chief Complaint   Patient presents with    Trauma Green       HPI  Alec Howell is a 82 y.o. male who presents to the Emergency Department via EMS as a trauma green transfer from [facility] for a subdural hematoma. Per EMS the patient fell off his toilet two days ago and hit his head. He presented to [facility] today for evaluation of a headache, right shoulder pain, and right hip pain. CT revealed the patient had a 2.1 cm subdural hematoma in the right frontal area and a 1 mm right to left midline shift. Prompting him to be transferred to [facility] for further evaluation and care. Upon arrival the patient is arrives AAOx4 with a GCS of 15 and moving all extremities. The patient denies any loss of consciousness or chest pain. He also notes that his headache has resolved. He still complains of right hip and shoulder pain. However Shoulder and Hip x-rays at [facility] indicated no abnormalities. He has a history of a stent placement, hypertension, and diabetes. He denies any history of heart attack or stoke. He is not on blood thinners, but he does take Asprin daily. He has no known allergies to medications. There are no known alleviating or exacerbating factors.       REVIEW OF SYSTEMS  Review of Systems   Cardiovascular:  Negative for chest pain.   Musculoskeletal:  Positive for falls and joint pain (right hip and shoulder).   Neurological:  Positive for headaches (resolved). Negative for loss of consciousness.   All other systems reviewed and are negative.    PAST MEDICAL HISTORY   has a past medical history of Diabetes (HCC) and Hypertension.    SURGICAL HISTORY   has a past surgical history that includes other cardiac surgery; transurethral elec-surg prostatectom  "(4/8/2019); cystoscopy (4/8/2019); and circumcision adult (4/8/2019).    SOCIAL HISTORY  Social History     Tobacco Use    Smoking status: Some Days     Types: Cigars    Smokeless tobacco: Never   Substance Use Topics    Alcohol use: No    Drug use: No      Social History     Substance and Sexual Activity   Drug Use No       FAMILY HISTORY  Family History   Problem Relation Age of Onset    Alzheimer's Disease Mother     No Known Problems Father        CURRENT MEDICATIONS  Current medications can be seen on the nurse's note.       ALLERGIES  No Known Allergies    PHYSICAL EXAM  VITAL SIGNS: /65   Pulse 78   Temp 36.8 °C (98.2 °F)   Resp 18   Ht 1.753 m (5' 9\")   Wt 74.8 kg (165 lb)   SpO2 96%   BMI 24.37 kg/m²   Vitals reviewed.  Constitutional: Awake alert nontoxic no acute distress.  HENT: Normocephalic, Atraumatic, no overt signs of trauma.  Eyes: PERRL, EOMI, Conjunctiva normal, No discharge.   Neck: Normal range of motion, No tenderness, Supple, No stridor.   Cardiovascular: Normal heart rate, Normal rhythm, No murmurs, No rubs, No gallops.   Thorax & Lungs: Normal breath sounds, No respiratory distress, No wheezing,  Abdomen: Bowel sounds normal, Soft, No tenderness  Skin: Warm, Dry, No erythema, No rash.   Back: No tenderness, No CVA tenderness.  No signs of trauma.  Musculoskeletal: Good range of motion in all major joints.  Tenderness around the right shoulder.  Normal range of motion.  Neurologic: Alert, Normal motor function, Normal sensory function, No focal deficits noted.   Psychiatric: Affect normal    LABS  Results for orders placed or performed during the hospital encounter of 09/12/22   DIAGNOSTIC ALCOHOL   Result Value Ref Range    Diagnostic Alcohol <10.1 <10.1 mg/dL   CBC WITHOUT DIFFERENTIAL   Result Value Ref Range    WBC 9.1 4.8 - 10.8 K/uL    RBC 3.65 (L) 4.70 - 6.10 M/uL    Hemoglobin 11.4 (L) 14.0 - 18.0 g/dL    Hematocrit 33.4 (L) 42.0 - 52.0 %    MCV 91.5 81.4 - 97.8 fL    " MCH 31.2 27.0 - 33.0 pg    MCHC 34.1 33.7 - 35.3 g/dL    RDW 42.0 35.9 - 50.0 fL    Platelet Count 349 164 - 446 K/uL    MPV 10.6 9.0 - 12.9 fL   Comp Metabolic Panel   Result Value Ref Range    Sodium 137 135 - 145 mmol/L    Potassium 4.6 3.6 - 5.5 mmol/L    Chloride 103 96 - 112 mmol/L    Co2 27 20 - 33 mmol/L    Anion Gap 7.0 7.0 - 16.0    Glucose 186 (H) 65 - 99 mg/dL    Bun 18 8 - 22 mg/dL    Creatinine 0.97 0.50 - 1.40 mg/dL    Calcium 8.6 8.5 - 10.5 mg/dL    AST(SGOT) 22 12 - 45 U/L    ALT(SGPT) 15 2 - 50 U/L    Alkaline Phosphatase 74 30 - 99 U/L    Total Bilirubin 0.3 0.1 - 1.5 mg/dL    Albumin 4.0 3.2 - 4.9 g/dL    Total Protein 6.7 6.0 - 8.2 g/dL    Globulin 2.7 1.9 - 3.5 g/dL    A-G Ratio 1.5 g/dL   Prothrombin Time   Result Value Ref Range    PT 13.9 12.0 - 14.6 sec    INR 1.08 0.87 - 1.13   APTT   Result Value Ref Range    APTT 33.8 24.7 - 36.0 sec   COD - Adult (Type and Screen)   Result Value Ref Range    ABO Grouping Only O     Rh Grouping Only NEG     Antibody Screen-Cod NEG    ESTIMATED GFR   Result Value Ref Range    GFR (CKD-EPI) 79 >60 mL/min/1.73 m 2       All labs reviewed by me.    RADIOLOGY  DX-CHEST-LIMITED (1 VIEW)   Final Result      1.  No acute cardiac or pulmonary abnormalities are identified.      OUTSIDE IMAGES-DX UPPER EXTREMITY, RIGHT   Final Result      [id number] FILM X-RAY   Final Result      OUTSIDE IMAGES-CT HEAD   Final Result        The radiologist's interpretation of all radiological studies have been reviewed by me.    COURSE & MEDICAL DECISION MAKING  Pertinent Labs & Imaging studies reviewed. (See chart for details)    Obtained and reviewed past medical records from [facility].  Records here to the transferring hospital including physicians notes and images.    The patient was a trauma activation trauma APN was here who saw the patient.  This was discussed with the trauma surgeon who felt the patient should be admitted to the ICU.  He has a big 3 by imaging size  criteria and anticoagulation use.        2:25 PM Patient seen and examined at bedside. The patient presents as a trauma green transfer for a subdural hematoma, and the differential diagnosis includes but is not limited to intercranial hemorrhage. Ordered for DX-Chest, Diagnostic alcohol, CBC without differential, CMP, Prothrombin time, APTT, Platelet mapping with basic TEG, ABO Rh confirmation and COD to evaluate.     2:20 PM Paged neurosurgery.  Trauma request hospitalist evaluation and admission.    3:16 PM I discussed the patient's case and the above findings with Dr. Rosa (Victorino) who would like the patient admitted to the ICU.  He felt the patient could be hospitalized in the medical ICU not the trauma ICU.  He is great deal of atrophy.  Does not feel he requires a trauma evaluation.    3:29 PM Paged Intensivist.     3:32 PM I discussed the patient's case and the above findings with Dr. Leiva (Intensivist) who agrees to evaluate the patient for hospitalization.     The patient was reassessed.  He initially had some hip and shoulder pain.  X-rays were obtained to the other hospital.  He now has no pain and good range of motion of both of these joints.  I think an occult hip fracture is unlikely given his exam at this time she was not worked up with a CT.    DISPOSITION:  Patient will be hospitalized by Dr. Leiva in guarded condition.      FINAL IMPRESSION  1. SDH (subdural hematoma) (HCC)     2.  Anticoagulation with aspirin  3.  Critical care time 35 minutes no procedures     Omar REDDY (Zina), am scribing for, and in the presence of, Luis Goel M.D..    Electronically signed by: Omar Guallpa (Zina), 9/12/2022    Luis REDDY M.D. personally performed the services described in this documentation, as scribed by Omar Guallpa in my presence, and it is both accurate and complete.    The note accurately reflects work and decisions made by me.  Luis Goel,  M.D.  9/12/2022  5:07 PM

## 2022-09-13 ENCOUNTER — ANESTHESIA EVENT (OUTPATIENT)
Dept: SURGERY | Facility: MEDICAL CENTER | Age: 82
DRG: 025 | End: 2022-09-13
Payer: MEDICARE

## 2022-09-13 PROBLEM — E87.8 ELECTROLYTE ABNORMALITY: Status: ACTIVE | Noted: 2022-09-13

## 2022-09-13 LAB
ABO + RH BLD: NORMAL
ANION GAP SERPL CALC-SCNC: 8 MMOL/L (ref 7–16)
BASOPHILS # BLD AUTO: 1.1 % (ref 0–1.8)
BASOPHILS # BLD: 0.11 K/UL (ref 0–0.12)
BUN SERPL-MCNC: 24 MG/DL (ref 8–22)
CALCIUM SERPL-MCNC: 9 MG/DL (ref 8.5–10.5)
CHLORIDE SERPL-SCNC: 103 MMOL/L (ref 96–112)
CO2 SERPL-SCNC: 25 MMOL/L (ref 20–33)
CREAT SERPL-MCNC: 1.06 MG/DL (ref 0.5–1.4)
EOSINOPHIL # BLD AUTO: 0.58 K/UL (ref 0–0.51)
EOSINOPHIL NFR BLD: 6 % (ref 0–6.9)
ERYTHROCYTE [DISTWIDTH] IN BLOOD BY AUTOMATED COUNT: 42.9 FL (ref 35.9–50)
EST. AVERAGE GLUCOSE BLD GHB EST-MCNC: 200 MG/DL
GFR SERPLBLD CREATININE-BSD FMLA CKD-EPI: 71 ML/MIN/1.73 M 2
GLUCOSE BLD STRIP.AUTO-MCNC: 154 MG/DL (ref 65–99)
GLUCOSE BLD STRIP.AUTO-MCNC: 188 MG/DL (ref 65–99)
GLUCOSE BLD STRIP.AUTO-MCNC: 201 MG/DL (ref 65–99)
GLUCOSE BLD STRIP.AUTO-MCNC: 243 MG/DL (ref 65–99)
GLUCOSE BLD STRIP.AUTO-MCNC: 246 MG/DL (ref 65–99)
GLUCOSE SERPL-MCNC: 178 MG/DL (ref 65–99)
HBA1C MFR BLD: 8.6 % (ref 4–5.6)
HCT VFR BLD AUTO: 36.5 % (ref 42–52)
HGB BLD-MCNC: 11.8 G/DL (ref 14–18)
IMM GRANULOCYTES # BLD AUTO: 0.05 K/UL (ref 0–0.11)
IMM GRANULOCYTES NFR BLD AUTO: 0.5 % (ref 0–0.9)
LYMPHOCYTES # BLD AUTO: 3.02 K/UL (ref 1–4.8)
LYMPHOCYTES NFR BLD: 31.2 % (ref 22–41)
MAGNESIUM SERPL-MCNC: 1.9 MG/DL (ref 1.5–2.5)
MCH RBC QN AUTO: 30.5 PG (ref 27–33)
MCHC RBC AUTO-ENTMCNC: 32.3 G/DL (ref 33.7–35.3)
MCV RBC AUTO: 94.3 FL (ref 81.4–97.8)
MONOCYTES # BLD AUTO: 1.02 K/UL (ref 0–0.85)
MONOCYTES NFR BLD AUTO: 10.5 % (ref 0–13.4)
NEUTROPHILS # BLD AUTO: 4.9 K/UL (ref 1.82–7.42)
NEUTROPHILS NFR BLD: 50.7 % (ref 44–72)
NRBC # BLD AUTO: 0 K/UL
NRBC BLD-RTO: 0 /100 WBC
PHOSPHATE SERPL-MCNC: 3.6 MG/DL (ref 2.5–4.5)
PLATELET # BLD AUTO: 340 K/UL (ref 164–446)
PMV BLD AUTO: 10.7 FL (ref 9–12.9)
POTASSIUM SERPL-SCNC: 4.1 MMOL/L (ref 3.6–5.5)
RBC # BLD AUTO: 3.87 M/UL (ref 4.7–6.1)
SODIUM SERPL-SCNC: 136 MMOL/L (ref 135–145)
WBC # BLD AUTO: 9.7 K/UL (ref 4.8–10.8)

## 2022-09-13 PROCEDURE — 700102 HCHG RX REV CODE 250 W/ 637 OVERRIDE(OP): Performed by: INTERNAL MEDICINE

## 2022-09-13 PROCEDURE — 700111 HCHG RX REV CODE 636 W/ 250 OVERRIDE (IP): Performed by: NURSE PRACTITIONER

## 2022-09-13 PROCEDURE — A9270 NON-COVERED ITEM OR SERVICE: HCPCS | Performed by: NURSE PRACTITIONER

## 2022-09-13 PROCEDURE — 99291 CRITICAL CARE FIRST HOUR: CPT | Performed by: NURSE PRACTITIONER

## 2022-09-13 PROCEDURE — 83036 HEMOGLOBIN GLYCOSYLATED A1C: CPT

## 2022-09-13 PROCEDURE — 700111 HCHG RX REV CODE 636 W/ 250 OVERRIDE (IP): Performed by: INTERNAL MEDICINE

## 2022-09-13 PROCEDURE — A9270 NON-COVERED ITEM OR SERVICE: HCPCS | Performed by: INTERNAL MEDICINE

## 2022-09-13 PROCEDURE — 85025 COMPLETE CBC W/AUTO DIFF WBC: CPT

## 2022-09-13 PROCEDURE — 83735 ASSAY OF MAGNESIUM: CPT

## 2022-09-13 PROCEDURE — 770022 HCHG ROOM/CARE - ICU (200)

## 2022-09-13 PROCEDURE — 82962 GLUCOSE BLOOD TEST: CPT | Mod: 91

## 2022-09-13 PROCEDURE — 700102 HCHG RX REV CODE 250 W/ 637 OVERRIDE(OP): Performed by: NURSE PRACTITIONER

## 2022-09-13 PROCEDURE — 80048 BASIC METABOLIC PNL TOTAL CA: CPT

## 2022-09-13 PROCEDURE — 84100 ASSAY OF PHOSPHORUS: CPT

## 2022-09-13 RX ORDER — MAGNESIUM SULFATE HEPTAHYDRATE 40 MG/ML
2 INJECTION, SOLUTION INTRAVENOUS ONCE
Status: COMPLETED | OUTPATIENT
Start: 2022-09-13 | End: 2022-09-13

## 2022-09-13 RX ADMIN — GABAPENTIN 100 MG: 100 CAPSULE ORAL at 05:01

## 2022-09-13 RX ADMIN — HYDRALAZINE HYDROCHLORIDE 20 MG: 20 INJECTION INTRAMUSCULAR; INTRAVENOUS at 18:10

## 2022-09-13 RX ADMIN — MAGNESIUM SULFATE HEPTAHYDRATE 2 G: 40 INJECTION, SOLUTION INTRAVENOUS at 08:18

## 2022-09-13 RX ADMIN — ATORVASTATIN CALCIUM 40 MG: 40 TABLET, FILM COATED ORAL at 21:20

## 2022-09-13 RX ADMIN — ACETAMINOPHEN 650 MG: 325 TABLET, FILM COATED ORAL at 05:01

## 2022-09-13 RX ADMIN — INSULIN HUMAN 2 UNITS: 100 INJECTION, SOLUTION PARENTERAL at 08:19

## 2022-09-13 RX ADMIN — GABAPENTIN 100 MG: 100 CAPSULE ORAL at 16:58

## 2022-09-13 RX ADMIN — GABAPENTIN 100 MG: 100 CAPSULE ORAL at 11:38

## 2022-09-13 RX ADMIN — AMLODIPINE BESYLATE 10 MG: 10 TABLET ORAL at 05:01

## 2022-09-13 RX ADMIN — ZONISAMIDE 100 MG: 50 CAPSULE ORAL at 21:19

## 2022-09-13 RX ADMIN — HYDRALAZINE HYDROCHLORIDE 20 MG: 20 INJECTION INTRAMUSCULAR; INTRAVENOUS at 03:13

## 2022-09-13 RX ADMIN — DOCUSATE SODIUM 50 MG AND SENNOSIDES 8.6 MG 2 TABLET: 8.6; 5 TABLET, FILM COATED ORAL at 05:01

## 2022-09-13 RX ADMIN — INSULIN HUMAN 3 UNITS: 100 INJECTION, SOLUTION PARENTERAL at 11:38

## 2022-09-13 RX ADMIN — INSULIN HUMAN 3 UNITS: 100 INJECTION, SOLUTION PARENTERAL at 21:23

## 2022-09-13 RX ADMIN — INSULIN HUMAN 2 UNITS: 100 INJECTION, SOLUTION PARENTERAL at 16:58

## 2022-09-13 ASSESSMENT — ENCOUNTER SYMPTOMS
VOMITING: 0
BLURRED VISION: 0
NAUSEA: 0
ABDOMINAL PAIN: 0
COUGH: 0
SEIZURES: 0
MYALGIAS: 1
HEARTBURN: 0
WEAKNESS: 1
SENSORY CHANGE: 1
FEVER: 0
CHILLS: 0
FLANK PAIN: 0
DIZZINESS: 0
HEADACHES: 0

## 2022-09-13 ASSESSMENT — PAIN DESCRIPTION - PAIN TYPE
TYPE: ACUTE PAIN
TYPE: CHRONIC PAIN;NEUROPATHIC PAIN
TYPE: ACUTE PAIN

## 2022-09-13 NOTE — PROGRESS NOTES
4 Eyes Skin Assessment Completed by GRETCHEN Barker and Demetria Ordonez RN.    Head WDL  Ears WDL  Nose WDL  Mouth WDL  Neck WDL  Breast/Chest WDL  Shoulder Blades WDL  Spine WDL  (R) Arm/Elbow/Hand Redness, Blanching, and Abrasion  (L) Arm/Elbow/Hand Redness and Blanching  Abdomen WDL  Groin WDL  Scrotum/Coccyx/Buttocks Redness, Blanching, Excoriation, and Moisture Fissure  (R) Leg Bruising  (L) Leg Bruising  (R) Heel/Foot/Toe Redness, Blanching, Boggy, Bruising, and Scab  (L) Heel/Foot/Toe Redness, Blanching, Discoloration, and Boggy          Devices In Places ECG, Blood Pressure Cuff, Pulse Ox, and SCD's      Interventions In Place Sacral Mepilex, Pillows, Q2 Turns, Low Air Loss Mattress, and Heels Loaded W/Pillows    Possible Skin Injury Yes    Pictures Uploaded Into Epic Yes  Wound Consult Placed N/A  RN Wound Prevention Protocol Ordered No

## 2022-09-13 NOTE — CONSULTS
Neurosurgery Consultation  9/12/2022 1700  Referring MD:  Dr. Goel  Reason for referral:  right SDH      CHIEF COMPLAINT  Right hip pain     HPI  Alec Howell is a 82 y.o. male who  fell off his toilet two days ago and hit his head. He presented to [facility] today for evaluation of a headache, right shoulder pain, and right hip pain. CT revealed the patient had a 2.1 cm subdural hematoma in the right frontal area and a 1 mm right to left midline shift. He was transferred to [facility] for further evaluation and care. He was noted to be  AAOx4 with a GCS of 15 and moving all extremities in theER.  The patient denies any loss of consciousness. His headache has resolved. He still complains of right hip and shoulder pain. However Shoulder and Hip x-rays at [facility] indicated no abnormalities. He has a history of a stent placement, hypertension, and diabetes. And takes ASA.           REVIEW OF SYSTEMS  Review of Systems   Cardiovascular:  Negative for chest pain.   Musculoskeletal:  Positive for falls and joint pain (right hip and shoulder).   Neurological:  Positive for headaches (resolved). Negative for loss of consciousness.   All other systems reviewed and are negative.     PAST MEDICAL HISTORY   has a past medical history of Diabetes (HCC) and Hypertension.     SURGICAL HISTORY   has a past surgical history that includes other cardiac surgery; transurethral elec-surg prostatectom (4/8/2019); cystoscopy (4/8/2019); and circumcision adult (4/8/2019).     SOCIAL HISTORY  Social History            Tobacco Use    Smoking status: Some Days       Types: Cigars    Smokeless tobacco: Never   Substance Use Topics    Alcohol use: No    Drug use: No      Social History          Substance and Sexual Activity   Drug Use No         FAMILY HISTORY  Family History         Family History   Problem Relation Age of Onset    Alzheimer's Disease Mother      No Known Problems Father              CURRENT MEDICATIONS  Current  "medications can be seen on the nurse's note.         ALLERGIES  No Known Allergies     PHYSICAL EXAM  VITAL SIGNS: /65   Pulse 78   Temp 36.8 °C (98.2 °F)   Resp 18   Ht 1.753 m (5' 9\")   Wt 74.8 kg (165 lb)   SpO2 96%   BMI 24.37 kg/m²   Vitals reviewed.    PHYSICAL EXAMINATION:    Awake, alert   Speech fluent appropriate  In no apparent distress  Affect, mood appropriate  Oriented   Pupils 3 mm midline, reactive.  Conjugate gaze.  Visual fields full to confrontation  Face symmetric  Facial sensation intact light touch  Hearing intact to conversation, light finger rub bilaterally  Motor:  Bilateral   bicep, tricep, , wrist extension, hand intrinsics                IP, thigh adduction, thigh abduction, quadriceps, hamstrings, dorsiflexion,  5/5 no pronator drift  Sensation:  Intact touch face, neck, torso, four extremities  Reflex:  No Odonnell's no clonus     LABS        Results for orders placed or performed during the hospital encounter of 09/12/22   DIAGNOSTIC ALCOHOL   Result Value Ref Range     Diagnostic Alcohol <10.1 <10.1 mg/dL   CBC WITHOUT DIFFERENTIAL   Result Value Ref Range     WBC 9.1 4.8 - 10.8 K/uL     RBC 3.65 (L) 4.70 - 6.10 M/uL     Hemoglobin 11.4 (L) 14.0 - 18.0 g/dL     Hematocrit 33.4 (L) 42.0 - 52.0 %     MCV 91.5 81.4 - 97.8 fL     MCH 31.2 27.0 - 33.0 pg     MCHC 34.1 33.7 - 35.3 g/dL     RDW 42.0 35.9 - 50.0 fL     Platelet Count 349 164 - 446 K/uL     MPV 10.6 9.0 - 12.9 fL   Comp Metabolic Panel   Result Value Ref Range     Sodium 137 135 - 145 mmol/L     Potassium 4.6 3.6 - 5.5 mmol/L     Chloride 103 96 - 112 mmol/L     Co2 27 20 - 33 mmol/L     Anion Gap 7.0 7.0 - 16.0     Glucose 186 (H) 65 - 99 mg/dL     Bun 18 8 - 22 mg/dL     Creatinine 0.97 0.50 - 1.40 mg/dL     Calcium 8.6 8.5 - 10.5 mg/dL     AST(SGOT) 22 12 - 45 U/L     ALT(SGPT) 15 2 - 50 U/L     Alkaline Phosphatase 74 30 - 99 U/L     Total Bilirubin 0.3 0.1 - 1.5 mg/dL     Albumin 4.0 3.2 - 4.9 g/dL     " Total Protein 6.7 6.0 - 8.2 g/dL     Globulin 2.7 1.9 - 3.5 g/dL     A-G Ratio 1.5 g/dL   Prothrombin Time   Result Value Ref Range     PT 13.9 12.0 - 14.6 sec     INR 1.08 0.87 - 1.13   APTT   Result Value Ref Range     APTT 33.8 24.7 - 36.0 sec   COD - Adult (Type and Screen)   Result Value Ref Range     ABO Grouping Only O       Rh Grouping Only NEG       Antibody Screen-Cod NEG     ESTIMATED GFR   Result Value Ref Range     GFR (CKD-EPI) 79 >60 mL/min/1.73 m 2         RADIOLOGY  As above    AP:  82- year old male with subacute ~2 cm right frontal parietal SAH with obvious membrane formation. This results in 1 mm midline shift due to very severe diffuse cerebral atrophy.  He has no focal deficits.  Given the size of the hematoma, evacuation was discussed.  Given obvious membrane formation I recommend craniotomy with membrane resection and subdural drain placement.  Risks including but not limited to infection, bleeding, seizure, recurrent hematoma, incision healing difficulty was discussed.  He expressed understanding of these issues.  OR time is available Wednesday.  D/C ASA

## 2022-09-13 NOTE — HOSPITAL COURSE
82 yom with a PMHx of cardiac stent placement (approximately 10 years ago)  primary HTN, BPH, diabetes type II, who presented to the ED at [facility] as a transfer from outside facility with a chief complaint of headache, right shoulder and hip pain after falling off of his toilet 2 days ago.  He denies any LOC, chest pain or shortness of breath. He does state that he has had chronic right-sided weakness, as well as decreased sensation to his right side for the past year or so that has not improved or worsened.   Shoulder and hip films at outlying facility were found to be negative for any fractures.  CT of head showed a 2.1 cm subdural hematoma in the right frontal area with a 1 mm right to left midline shift, therefore he  was transferred to [facility] for neurosurgical intervention and critical care management.

## 2022-09-13 NOTE — PROGRESS NOTES
Neurosurgery team (Justine Larios) at the bedside for assessment, per NP, ok to change neuro checks to every 4 hours.

## 2022-09-13 NOTE — ED NOTES
Pt admitted to ICU, vss, report to receiving RN, pt transported upstairs on [place] with RN on monitor.

## 2022-09-13 NOTE — PROGRESS NOTES
Critical Care Progress Note    Date of admission  9/12/2022    Chief Complaint  82 y.o. male admitted 9/12/2022 post GLF complaining of right shoulder and hip pain    Hospital Course  82 yom with a PMHx of cardiac stent placement (approximately 10 years ago)  primary HTN, BPH, diabetes type II, who presented to the ED at [facility] as a transfer from outside facility with a chief complaint of headache, right shoulder and hip pain after falling off of his toilet 2 days ago.  He denies any LOC, chest pain or shortness of breath. He does state that he has had chronic right-sided weakness, as well as decreased sensation to his right side for the past year or so that has not improved or worsened.   Shoulder and hip films at outlying facility were found to be negative for any fractures.  CT of head showed a 2.1 cm subdural hematoma in the right frontal area with a 1 mm right to left midline shift, therefore he  was transferred to [facility] for neurosurgical intervention and critical care management.      Interval Problem Update  Reviewed last 24 hour events:  -No events overnight  -NO changes in neuro status- slight decrease in strenght and sensation to right extrem   Did not require any treatment for HTN - to keep goal to keep SBP less than 160  -NPO for craniotomy with membrane resection scheduled for tomorrow Wednesday 9/14  -Electrolytes- replace mag  -Remains on RA without distress  I-/O - 24 hr +210/ since admit -680  -DVT PPX- SCDs      Review of Systems  Review of Systems   Constitutional:  Positive for malaise/fatigue. Negative for chills and fever.   Eyes:  Negative for blurred vision.   Respiratory:  Negative for cough.    Cardiovascular:  Negative for chest pain.   Gastrointestinal:  Negative for abdominal pain, heartburn, nausea and vomiting.   Genitourinary:  Positive for frequency. Negative for dysuria and flank pain.   Musculoskeletal:  Positive for myalgias.   Neurological:  Positive for sensory  "change and weakness. Negative for dizziness, seizures and headaches.        \"My left side is weak with decrease in sensation      Vital Signs for last 24 hours   Temp:  [36.1 °C (97 °F)-36.8 °C (98.2 °F)] 36.4 °C (97.6 °F)  Pulse:  [70-94] 79  Resp:  [12-30] 19  BP: (108-161)/(58-83) 134/71  SpO2:  [95 %-99 %] 99 %    Hemodynamic parameters for last 24 hours       Respiratory Information for the last 24 hours       Physical Exam   Physical Exam  Vitals and nursing note reviewed.   Constitutional:       General: He is awake.      Appearance: Normal appearance.   HENT:      Head: Normocephalic.      Mouth/Throat:      Mouth: Mucous membranes are moist.   Eyes:      General: Lids are normal.      Pupils: Pupils are equal, round, and reactive to light.   Cardiovascular:      Rate and Rhythm: Regular rhythm. Tachycardia present.      Pulses:           Radial pulses are 2+ on the right side and 2+ on the left side.        Dorsalis pedis pulses are 1+ on the right side and 1+ on the left side.   Pulmonary:      Effort: Pulmonary effort is normal.      Breath sounds: Normal breath sounds and air entry.   Abdominal:      General: Bowel sounds are normal.      Palpations: Abdomen is soft.   Genitourinary:     Comments: Voiding clear yellow urine  Skin:     General: Skin is warm and dry.      Capillary Refill: Capillary refill takes less than 2 seconds.   Neurological:      Mental Status: He is alert and oriented to person, place, and time. Mental status is at baseline.      GCS: GCS eye subscore is 4. GCS verbal subscore is 5. GCS motor subscore is 6.      Comments: AAO X 4   MONTEMAYOR spontaneously- Right side extrem slightly weaker than Left side extrem  NEDA 2.5mm      Psychiatric:         Attention and Perception: Attention normal.         Mood and Affect: Mood normal.         Speech: Speech normal.         Behavior: Behavior normal.       Medications  Current Facility-Administered Medications   Medication Dose Route Frequency " Provider Last Rate Last Admin    senna-docusate (PERICOLACE or SENOKOT S) 8.6-50 MG per tablet 2 Tablet  2 Tablet Oral BID Dc Leiva M.D.   2 Tablet at 09/13/22 0501    And    polyethylene glycol/lytes (MIRALAX) PACKET 1 Packet  1 Packet Oral QDAY PRN Dc Leiva M.D.        And    magnesium hydroxide (MILK OF MAGNESIA) suspension 30 mL  30 mL Oral QDAY PRN Dc Leiva M.D.        And    bisacodyl (DULCOLAX) suppository 10 mg  10 mg Rectal QDAY PRN Dc Leiva M.D.        Respiratory Therapy Consult   Nebulization Continuous RT Dc Leiva M.D.        ondansetron (ZOFRAN) syringe/vial injection 4 mg  4 mg Intravenous Q4HRS PRN Dc Leiva M.D.        ondansetron (ZOFRAN ODT) dispertab 4 mg  4 mg Oral Q4HRS PRN Dc Leiva M.D.        amLODIPine (NORVASC) tablet 10 mg  10 mg Oral DAILY Dc Leiva M.D.   10 mg at 09/13/22 0501    atorvastatin (LIPITOR) tablet 40 mg  40 mg Oral Nightly Dc Leiva M.D.   40 mg at 09/12/22 2051    gabapentin (NEURONTIN) capsule 100 mg  100 mg Oral TID Dc Leiva M.D.   100 mg at 09/13/22 1138    zonisamide (ZONEGRAN) capsule 100 mg  100 mg Oral QHS Dc Leiva M.D.   100 mg at 09/12/22 2051    hydrALAZINE (APRESOLINE) injection 20 mg  20 mg Intravenous Q4HRS PRN Dc Leiva M.D.   20 mg at 09/13/22 0313    labetalol (NORMODYNE/TRANDATE) injection 10-20 mg  10-20 mg Intravenous Q4HRS PRN Dc Leiva M.D.        insulin regular (HumuLIN R,NovoLIN R) injection  2-9 Units Subcutaneous 4X/DAY ACHS Dc Leiva M.D.   3 Units at 09/13/22 1138    And    dextrose 10 % BOLUS 25 g  25 g Intravenous Q15 MIN PRN Dc Leiva M.D.        MD Alert...ICU Electrolyte Replacement per Pharmacy   Other PHARMACY TO DOSE Dc Leiva M.D.        acetaminophen (Tylenol) tablet 650 mg  650 mg Oral Q4HRS PRN Giselle MEGAN Latona   650 mg at  09/13/22 0501       Fluids    Intake/Output Summary (Last 24 hours) at 9/13/2022 1317  Last data filed at 9/13/2022 1300  Gross per 24 hour   Intake 910 ml   Output 1300 ml   Net -390 ml       Laboratory          Recent Labs     09/12/22 1429 09/13/22  0333   SODIUM 137 136   POTASSIUM 4.6 4.1   CHLORIDE 103 103   CO2 27 25   BUN 18 24*   CREATININE 0.97 1.06   MAGNESIUM  --  1.9   PHOSPHORUS  --  3.6   CALCIUM 8.6 9.0     Recent Labs     09/12/22 1429 09/13/22  0333   ALTSGPT 15  --    ASTSGOT 22  --    ALKPHOSPHAT 74  --    TBILIRUBIN 0.3  --    GLUCOSE 186* 178*     Recent Labs     09/12/22 1429 09/13/22  0333   WBC 9.1 9.7   NEUTSPOLYS  --  50.70   LYMPHOCYTES  --  31.20   MONOCYTES  --  10.50   EOSINOPHILS  --  6.00   BASOPHILS  --  1.10   ASTSGOT 22  --    ALTSGPT 15  --    ALKPHOSPHAT 74  --    TBILIRUBIN 0.3  --      Recent Labs     09/12/22 1429 09/13/22  0333   RBC 3.65* 3.87*   HEMOGLOBIN 11.4* 11.8*   HEMATOCRIT 33.4* 36.5*   PLATELETCT 349 340   PROTHROMBTM 13.9  --    APTT 33.8  --    INR 1.08  --        Imaging  X-Ray:  I have personally reviewed the images and compared with prior images.  CT:    Reviewed    Assessment/Plan  * Subdural hematoma (HCC)- (present on admission)  Assessment & Plan  Imaging reveals right-sided subdural hematoma with mass-effect  Neuro checks every 4 hours  Dr. Gleason on board with plans for surgical evacuation- craniotomy with hematoma removal,membrane resection scheduled for tomorrow Wednesday 9/14  NPO at midnight  Strict blood pressure control with goal SBP less than 160  Check thromboelastogram with platelet mapping  SCDs only no chem DVT ppx  No asa  Okay to mobilize to chair with meals  Maintain eunatremic, euglycemic, normothermic    Electrolyte abnormality  Assessment & Plan  Active replacement of Mag- will continue to follow daily labs- cmp and replace as needed      Primary hypertension  Assessment & Plan  Goal SBP less than 160  Continue amlodipine, 10 mg  daily    Type 2 diabetes mellitus (HCC)- (present on admission)  Assessment & Plan   Glycohemoglobin- 8.6  Sliding scale insulin- adjust as needed        VTE:  Contraindicated  Ulcer: Not Indicated  Lines: None    I have performed a physical exam and reviewed and updated ROS and Plan today (9/13/2022). In review of yesterday's note (9/12/2022), there are no changes except as documented above.     Discussed patient condition and risk of morbidity and/or mortality with RN, RT, Pharmacy, Charge nurse / hot rounds, Patient, and neurosurgery  The patient remains critically ill.  Critical care time = 40 minutes in directly providing and coordinating critical care and extensive data review.  No time overlap and excludes procedures.

## 2022-09-13 NOTE — PROGRESS NOTES
Neurosurgery Progress Note    Subjective:  No events overnight   No complaints   Denies HA, vision change, paralysis     Exam:  AAO x3, fluent speech   3 PERRl, EOMI   -drift, tongue midline   MONTEMAYOR, FAC   Sensation intact throughout       BP  Min: 108/68  Max: 161/83  Pulse  Av.9  Min: 70  Max: 91  Resp  Av.7  Min: 12  Max: 30  Temp  Av.5 °C (97.7 °F)  Min: 36.1 °C (97 °F)  Max: 36.8 °C (98.2 °F)  SpO2  Av.4 %  Min: 95 %  Max: 99 %    No data recorded    Recent Labs     22  14222  0333   WBC 9.1 9.7   RBC 3.65* 3.87*   HEMOGLOBIN 11.4* 11.8*   HEMATOCRIT 33.4* 36.5*   MCV 91.5 94.3   MCH 31.2 30.5   MCHC 34.1 32.3*   RDW 42.0 42.9   PLATELETCT 349 340   MPV 10.6 10.7     Recent Labs     22  1429 22  0333   SODIUM 137 136   POTASSIUM 4.6 4.1   CHLORIDE 103 103   CO2 27 25   GLUCOSE 186* 178*   BUN 18 24*   CREATININE 0.97 1.06   CALCIUM 8.6 9.0     Recent Labs     22  142   APTT 33.8   INR 1.08     Recent Labs     22  142   REACTMIN 5.8   CLOTKINET 1.0   CLOTANGL 75.7   MAXCLOTS 68.4   [id number] 0.3   PRCINADP 9.4   PRCINAA 8.2       Intake/Output                         22 - 22 - 22 0659      Total  Total                 Intake    P.O.  60  200 260  --  -- --    P.O. 60 200 260 -- -- --    I.V.  0  -- 0  --  -- --    Pre-Hospital Volume 0 -- 0 -- -- --    Trauma Resuscitation Volume 0 -- 0 -- -- --    Blood  0  -- 0  --  -- --    PRBC Total Volume (Non-Barcoded) 0 -- 0 -- -- --    FFP Total Volume (Non-Barcoded) 0 -- 0 -- -- --    Platelets Total Volume (Non-Barcoded) 0 -- 0 -- -- --    Cryoprecipitate (Pooled) Total Volume (Non-Barcoded) 0 -- 0 -- -- --    Total Intake 60 200 260 -- -- --       Output    Urine  150  1000 1150  --  -- --    Number of Times Voided 1 x 6 x 7 x -- -- --    Urine Void (mL) 150 1000 1150 -- -- --    Other  0  -- 0  --  -- --    Pre-Hospital Output 0 --  0 -- -- --    Trauma Resuscitation Output 0 -- 0 -- -- --    Stool  --  -- --  --  -- --    Number of Times Stooled -- 0 x 0 x -- -- --    Blood  0  -- 0  --  -- --    Est. Blood Loss 0 -- 0 -- -- --    Total Output 150 1000 1150 -- -- --       Net I/O     -90 -800 -890 -- -- --              Intake/Output Summary (Last 24 hours) at 9/13/2022 0848  Last data filed at 9/13/2022 0657  Gross per 24 hour   Intake 260 ml   Output 1150 ml   Net -890 ml             magnesium sulfate  2 g Once    senna-docusate  2 Tablet BID    And    polyethylene glycol/lytes  1 Packet QDAY PRN    And    magnesium hydroxide  30 mL QDAY PRN    And    bisacodyl  10 mg QDAY PRN    Respiratory Therapy Consult   Continuous RT    ondansetron  4 mg Q4HRS PRN    ondansetron  4 mg Q4HRS PRN    amLODIPine  10 mg DAILY    atorvastatin  40 mg Nightly    gabapentin  100 mg TID    zonisamide  100 mg QHS    hydrALAZINE  20 mg Q4HRS PRN    labetalol  10-20 mg Q4HRS PRN    insulin regular  2-9 Units 4X/DAY ACHS    And    dextrose bolus  25 g Q15 MIN PRN    MD Alert...Adult ICU Electrolyte Replacement per Pharmacy   PHARMACY TO DOSE    acetaminophen  650 mg Q4HRS PRN       Assessment and Plan:  Hospital day #2  POD #NA   Prophylactic anticoagulation: no         Start date/time: TBD     Plan:  Stable  OR tomorrow for Right craniotomy, evacuation of hematoma, membrane resection at 1400 with Dr. Gleason   NPO midnight   NO asa or anticoagulants   Labs stable, keep eunatremic   Okay for meals in chair   Q4 neuro

## 2022-09-13 NOTE — CARE PLAN
The patient is Stable - Low risk of patient condition declining or worsening    Shift Goals  Clinical Goals: Stable Q2H neuro exam, mobility, comfort, rest  Patient Goals: Warmth, comfort, rest  Family Goals: EMMANUEL- not present    Q2H Neuro exams performed. Stable exam. Pt ambulated with standby assist and front wheel walker. Pt reported R hip/leg pain/neuropathy that he has dealt with for the last year. PRN tylenol given and heat applied. Bed alarm on. Call light within reach.    Progress made toward(s) clinical / shift goals:      Problem: Skin Integrity  Goal: Skin integrity is maintained or improved  Outcome: Progressing     Problem: Fall Risk  Goal: Patient will remain free from falls  Outcome: Progressing     Problem: Pain - Standard  Goal: Alleviation of pain or a reduction in pain to the patient’s comfort goal  Outcome: Progressing     Patient is not progressing towards the following goals:

## 2022-09-13 NOTE — ASSESSMENT & PLAN NOTE
-Actively replacing mag,   -Follow up BMP-  replace as needed  -multivitamin and thiamine   -dietary consult ordered- following with recs  - pt taking adequate PO and therefore IV fluid dcd   -Poor I and O measurements due to condom cath with episodes of being removed with unkn amount of urine output noted on pads

## 2022-09-13 NOTE — DIETARY
"Nutrition services: Day 1 of admit.  Alec Howell is a 82 y.o. male with admitting DX of SDH.    Consult received for MST 5 with weight loss and poor PO intake.    Pt seen at bedside with family present. Pt reports minimal appetite for years and has been eating ~50% of meals > 1 month. Pt reports previously at 155 lbs within last 3 months and current weight is 139 lbs, which is 16 lb (10.3%) is severe weight loss. Pt reports drinking Boost shakes at home sometimes 2-3 shakes per day and likes them, agreeable to having them inpatient.    Assessment:  Height: 177.8 cm (5' 10\")  Weight: 63.3 kg (139 lb 8.8 oz) - bed scale  Body mass index is 20.02 kg/m²., BMI classification: normal  Diet/Intake: RD modified for diabetic diet based off A1C, 50-75% of 2 meals    Evaluation:   PMH of T2DM, HTN, and BPH.   Pt reports eating 50% of meals for > 1 month with 10.3% severe weight loss in 3 months.   Per weight hx: last weight on file is 165 lbs on 4/8/2019 and weight had showed downtrend.   Nutrition focused physical exam showing severe muscle and severe fat wasting of the triceps, temples, scapula, and calves.   Skin: No wounds or edema.   Labs: glucose 178, BUN 24, A1C 8.6  Meds: lipitor, neurontin, SSI, colace  Last BM: PTA    Malnutrition Risk: Severe malnutrition in the context of starvation-related as evidenced by eating <75% of meals for > 1 month, 10.3% severe weight loss in ~ 3 months, and severe muscle and severe fat wasting.    Recommendations/Plan:  Diabetic diet with Boost glucose with meals to promote healthy body weight.   Encourage intake of meals and supplements.  Document intake of all meals and supplements as % taken in ADL's to provide interdisciplinary communication across all shifts.   Monitor weight.  Nutrition rep will continue to see patient for ongoing meal and snack preferences.     RD following.      "

## 2022-09-14 ENCOUNTER — ANESTHESIA (OUTPATIENT)
Dept: SURGERY | Facility: MEDICAL CENTER | Age: 82
DRG: 025 | End: 2022-09-14
Payer: MEDICARE

## 2022-09-14 LAB
ANION GAP SERPL CALC-SCNC: 12 MMOL/L (ref 7–16)
BASOPHILS # BLD AUTO: 1.7 % (ref 0–1.8)
BASOPHILS # BLD: 0.18 K/UL (ref 0–0.12)
BUN SERPL-MCNC: 29 MG/DL (ref 8–22)
BURR CELLS BLD QL SMEAR: NORMAL
CALCIUM SERPL-MCNC: 9.2 MG/DL (ref 8.5–10.5)
CHLORIDE SERPL-SCNC: 102 MMOL/L (ref 96–112)
CO2 SERPL-SCNC: 25 MMOL/L (ref 20–33)
CREAT SERPL-MCNC: 1.11 MG/DL (ref 0.5–1.4)
EKG IMPRESSION: NORMAL
EOSINOPHIL # BLD AUTO: 0.09 K/UL (ref 0–0.51)
EOSINOPHIL NFR BLD: 0.9 % (ref 0–6.9)
ERYTHROCYTE [DISTWIDTH] IN BLOOD BY AUTOMATED COUNT: 43.3 FL (ref 35.9–50)
GFR SERPLBLD CREATININE-BSD FMLA CKD-EPI: 67 ML/MIN/1.73 M 2
GLUCOSE BLD STRIP.AUTO-MCNC: 112 MG/DL (ref 65–99)
GLUCOSE BLD STRIP.AUTO-MCNC: 159 MG/DL (ref 65–99)
GLUCOSE BLD STRIP.AUTO-MCNC: 172 MG/DL (ref 65–99)
GLUCOSE BLD STRIP.AUTO-MCNC: 175 MG/DL (ref 65–99)
GLUCOSE SERPL-MCNC: 185 MG/DL (ref 65–99)
HCT VFR BLD AUTO: 38 % (ref 42–52)
HGB BLD-MCNC: 12.5 G/DL (ref 14–18)
LYMPHOCYTES # BLD AUTO: 2.62 K/UL (ref 1–4.8)
LYMPHOCYTES NFR BLD: 25.2 % (ref 22–41)
MAGNESIUM SERPL-MCNC: 2.3 MG/DL (ref 1.5–2.5)
MANUAL DIFF BLD: NORMAL
MCH RBC QN AUTO: 31 PG (ref 27–33)
MCHC RBC AUTO-ENTMCNC: 32.9 G/DL (ref 33.7–35.3)
MCV RBC AUTO: 94.3 FL (ref 81.4–97.8)
MONOCYTES # BLD AUTO: 0.09 K/UL (ref 0–0.85)
MONOCYTES NFR BLD AUTO: 0.9 % (ref 0–13.4)
MORPHOLOGY BLD-IMP: NORMAL
NEUTROPHILS # BLD AUTO: 7.42 K/UL (ref 1.82–7.42)
NEUTROPHILS NFR BLD: 71.3 % (ref 44–72)
NRBC # BLD AUTO: 0 K/UL
NRBC BLD-RTO: 0 /100 WBC
PHOSPHATE SERPL-MCNC: 3.5 MG/DL (ref 2.5–4.5)
PLATELET # BLD AUTO: 378 K/UL (ref 164–446)
PLATELET BLD QL SMEAR: NORMAL
PMV BLD AUTO: 10.4 FL (ref 9–12.9)
POIKILOCYTOSIS BLD QL SMEAR: NORMAL
POTASSIUM SERPL-SCNC: 4.5 MMOL/L (ref 3.6–5.5)
RBC # BLD AUTO: 4.03 M/UL (ref 4.7–6.1)
RBC BLD AUTO: PRESENT
SODIUM SERPL-SCNC: 139 MMOL/L (ref 135–145)
WBC # BLD AUTO: 10.4 K/UL (ref 4.8–10.8)

## 2022-09-14 PROCEDURE — 700102 HCHG RX REV CODE 250 W/ 637 OVERRIDE(OP): Performed by: INTERNAL MEDICINE

## 2022-09-14 PROCEDURE — 110454 HCHG SHELL REV 250: Performed by: NEUROLOGICAL SURGERY

## 2022-09-14 PROCEDURE — 82962 GLUCOSE BLOOD TEST: CPT

## 2022-09-14 PROCEDURE — 700105 HCHG RX REV CODE 258: Performed by: NURSE PRACTITIONER

## 2022-09-14 PROCEDURE — 80048 BASIC METABOLIC PNL TOTAL CA: CPT

## 2022-09-14 PROCEDURE — 110371 HCHG SHELL REV 272: Performed by: NEUROLOGICAL SURGERY

## 2022-09-14 PROCEDURE — C1729 CATH, DRAINAGE: HCPCS | Performed by: NEUROLOGICAL SURGERY

## 2022-09-14 PROCEDURE — 00U20KZ SUPPLEMENT DURA MATER WITH NONAUTOLOGOUS TISSUE SUBSTITUTE, OPEN APPROACH: ICD-10-PCS | Performed by: NEUROLOGICAL SURGERY

## 2022-09-14 PROCEDURE — 83735 ASSAY OF MAGNESIUM: CPT

## 2022-09-14 PROCEDURE — A9270 NON-COVERED ITEM OR SERVICE: HCPCS | Performed by: INTERNAL MEDICINE

## 2022-09-14 PROCEDURE — 009400Z DRAINAGE OF INTRACRANIAL SUBDURAL SPACE WITH DRAINAGE DEVICE, OPEN APPROACH: ICD-10-PCS | Performed by: NEUROLOGICAL SURGERY

## 2022-09-14 PROCEDURE — 84100 ASSAY OF PHOSPHORUS: CPT

## 2022-09-14 PROCEDURE — 85007 BL SMEAR W/DIFF WBC COUNT: CPT

## 2022-09-14 PROCEDURE — 160009 HCHG ANES TIME/MIN: Performed by: NEUROLOGICAL SURGERY

## 2022-09-14 PROCEDURE — 160002 HCHG RECOVERY MINUTES (STAT): Performed by: NEUROLOGICAL SURGERY

## 2022-09-14 PROCEDURE — 770022 HCHG ROOM/CARE - ICU (200)

## 2022-09-14 PROCEDURE — 700111 HCHG RX REV CODE 636 W/ 250 OVERRIDE (IP): Performed by: ANESTHESIOLOGY

## 2022-09-14 PROCEDURE — 88305 TISSUE EXAM BY PATHOLOGIST: CPT

## 2022-09-14 PROCEDURE — 700102 HCHG RX REV CODE 250 W/ 637 OVERRIDE(OP): Performed by: NURSE PRACTITIONER

## 2022-09-14 PROCEDURE — 160041 HCHG SURGERY MINUTES - EA ADDL 1 MIN LEVEL 4: Performed by: NEUROLOGICAL SURGERY

## 2022-09-14 PROCEDURE — 700101 HCHG RX REV CODE 250: Performed by: ANESTHESIOLOGY

## 2022-09-14 PROCEDURE — A9270 NON-COVERED ITEM OR SERVICE: HCPCS | Performed by: NURSE PRACTITIONER

## 2022-09-14 PROCEDURE — 93005 ELECTROCARDIOGRAM TRACING: CPT | Performed by: NEUROLOGICAL SURGERY

## 2022-09-14 PROCEDURE — 700111 HCHG RX REV CODE 636 W/ 250 OVERRIDE (IP): Performed by: NEUROLOGICAL SURGERY

## 2022-09-14 PROCEDURE — 160048 HCHG OR STATISTICAL LEVEL 1-5: Performed by: NEUROLOGICAL SURGERY

## 2022-09-14 PROCEDURE — 93010 ELECTROCARDIOGRAM REPORT: CPT | Performed by: INTERNAL MEDICINE

## 2022-09-14 PROCEDURE — 00211 ANES ICR PX CRNEC/CRNOT HMTM: CPT | Performed by: ANESTHESIOLOGY

## 2022-09-14 PROCEDURE — 700101 HCHG RX REV CODE 250: Performed by: NEUROLOGICAL SURGERY

## 2022-09-14 PROCEDURE — 160029 HCHG SURGERY MINUTES - 1ST 30 MINS LEVEL 4: Performed by: NEUROLOGICAL SURGERY

## 2022-09-14 PROCEDURE — 99100 ANES PT EXTEME AGE<1 YR&>70: CPT | Performed by: ANESTHESIOLOGY

## 2022-09-14 PROCEDURE — 85025 COMPLETE CBC W/AUTO DIFF WBC: CPT

## 2022-09-14 PROCEDURE — A9270 NON-COVERED ITEM OR SERVICE: HCPCS | Performed by: ANESTHESIOLOGY

## 2022-09-14 PROCEDURE — 160035 HCHG PACU - 1ST 60 MINS PHASE I: Performed by: NEUROLOGICAL SURGERY

## 2022-09-14 PROCEDURE — C1713 ANCHOR/SCREW BN/BN,TIS/BN: HCPCS | Performed by: NEUROLOGICAL SURGERY

## 2022-09-14 PROCEDURE — 700102 HCHG RX REV CODE 250 W/ 637 OVERRIDE(OP): Performed by: ANESTHESIOLOGY

## 2022-09-14 PROCEDURE — 00C40ZZ EXTIRPATION OF MATTER FROM INTRACRANIAL SUBDURAL SPACE, OPEN APPROACH: ICD-10-PCS | Performed by: NEUROLOGICAL SURGERY

## 2022-09-14 PROCEDURE — 99291 CRITICAL CARE FIRST HOUR: CPT | Performed by: NURSE PRACTITIONER

## 2022-09-14 PROCEDURE — 700111 HCHG RX REV CODE 636 W/ 250 OVERRIDE (IP): Performed by: NURSE PRACTITIONER

## 2022-09-14 DEVICE — PLATE NC BURR HOLE COVER 10MM (6NCX6=36): Type: IMPLANTABLE DEVICE | Site: CRANIAL | Status: FUNCTIONAL

## 2022-09-14 DEVICE — DUREPAIR 4X5: Type: IMPLANTABLE DEVICE | Site: CRANIAL | Status: FUNCTIONAL

## 2022-09-14 DEVICE — SCREW STRYK NC 1.5X5MM (6NCX40=240) (80EA/PK) CONSIGNED QTY 240 PRE-LOAD: Type: IMPLANTABLE DEVICE | Site: CRANIAL | Status: FUNCTIONAL

## 2022-09-14 DEVICE — CATHETER VENTRICULAR TRANSLUCENT LARGE: Type: IMPLANTABLE DEVICE | Site: CRANIAL | Status: FUNCTIONAL

## 2022-09-14 DEVICE — SEALANT DURAL AUTOSPRAY ADHERUS (5EA/PK): Type: IMPLANTABLE DEVICE | Site: CRANIAL | Status: FUNCTIONAL

## 2022-09-14 DEVICE — PLATE SHUNT LARGE GAP 14MM WITH TAB (6NCX2=12): Type: IMPLANTABLE DEVICE | Site: CRANIAL | Status: FUNCTIONAL

## 2022-09-14 DEVICE — PLATE NC BURR HOLE COVER FOR SHUNT 14MM (6NCX6=36): Type: IMPLANTABLE DEVICE | Site: CRANIAL | Status: FUNCTIONAL

## 2022-09-14 RX ORDER — METOCLOPRAMIDE HYDROCHLORIDE 5 MG/ML
INJECTION INTRAMUSCULAR; INTRAVENOUS PRN
Status: DISCONTINUED | OUTPATIENT
Start: 2022-09-14 | End: 2022-09-14 | Stop reason: SURG

## 2022-09-14 RX ORDER — DIPHENHYDRAMINE HYDROCHLORIDE 50 MG/ML
12.5 INJECTION INTRAMUSCULAR; INTRAVENOUS
Status: DISCONTINUED | OUTPATIENT
Start: 2022-09-14 | End: 2022-09-14 | Stop reason: HOSPADM

## 2022-09-14 RX ORDER — HALOPERIDOL 5 MG/ML
1 INJECTION INTRAMUSCULAR
Status: DISCONTINUED | OUTPATIENT
Start: 2022-09-14 | End: 2022-09-14 | Stop reason: HOSPADM

## 2022-09-14 RX ORDER — LIDOCAINE HYDROCHLORIDE 20 MG/ML
INJECTION, SOLUTION EPIDURAL; INFILTRATION; INTRACAUDAL; PERINEURAL PRN
Status: DISCONTINUED | OUTPATIENT
Start: 2022-09-14 | End: 2022-09-14 | Stop reason: SURG

## 2022-09-14 RX ORDER — ONDANSETRON 2 MG/ML
4 INJECTION INTRAMUSCULAR; INTRAVENOUS
Status: DISCONTINUED | OUTPATIENT
Start: 2022-09-14 | End: 2022-09-14 | Stop reason: HOSPADM

## 2022-09-14 RX ORDER — CEFAZOLIN SODIUM 1 G/3ML
INJECTION, POWDER, FOR SOLUTION INTRAMUSCULAR; INTRAVENOUS PRN
Status: DISCONTINUED | OUTPATIENT
Start: 2022-09-14 | End: 2022-09-14 | Stop reason: SURG

## 2022-09-14 RX ORDER — OXYCODONE HYDROCHLORIDE 5 MG/1
5 TABLET ORAL EVERY 4 HOURS PRN
Status: DISCONTINUED | OUTPATIENT
Start: 2022-09-14 | End: 2022-09-16

## 2022-09-14 RX ORDER — BUPIVACAINE HYDROCHLORIDE AND EPINEPHRINE 5; 5 MG/ML; UG/ML
INJECTION, SOLUTION PERINEURAL
Status: DISCONTINUED | OUTPATIENT
Start: 2022-09-14 | End: 2022-09-14 | Stop reason: HOSPADM

## 2022-09-14 RX ORDER — ACETAMINOPHEN 500 MG
1000 TABLET ORAL ONCE
Status: COMPLETED | OUTPATIENT
Start: 2022-09-14 | End: 2022-09-14

## 2022-09-14 RX ORDER — CEFAZOLIN SODIUM 1 G/3ML
INJECTION, POWDER, FOR SOLUTION INTRAMUSCULAR; INTRAVENOUS
Status: DISCONTINUED | OUTPATIENT
Start: 2022-09-14 | End: 2022-09-14 | Stop reason: HOSPADM

## 2022-09-14 RX ORDER — ONDANSETRON 2 MG/ML
INJECTION INTRAMUSCULAR; INTRAVENOUS PRN
Status: DISCONTINUED | OUTPATIENT
Start: 2022-09-14 | End: 2022-09-14 | Stop reason: SURG

## 2022-09-14 RX ORDER — MORPHINE SULFATE 4 MG/ML
4 INJECTION INTRAVENOUS EVERY 4 HOURS PRN
Status: DISCONTINUED | OUTPATIENT
Start: 2022-09-14 | End: 2022-09-16

## 2022-09-14 RX ORDER — SODIUM CHLORIDE 9 MG/ML
INJECTION, SOLUTION INTRAVENOUS CONTINUOUS
Status: DISCONTINUED | OUTPATIENT
Start: 2022-09-14 | End: 2022-09-16

## 2022-09-14 RX ADMIN — ACETAMINOPHEN 650 MG: 325 TABLET, FILM COATED ORAL at 19:45

## 2022-09-14 RX ADMIN — FENTANYL CITRATE 25 MCG: 50 INJECTION, SOLUTION INTRAMUSCULAR; INTRAVENOUS at 17:58

## 2022-09-14 RX ADMIN — GABAPENTIN 100 MG: 100 CAPSULE ORAL at 09:11

## 2022-09-14 RX ADMIN — METOCLOPRAMIDE 10 MG: 5 INJECTION, SOLUTION INTRAMUSCULAR; INTRAVENOUS at 17:25

## 2022-09-14 RX ADMIN — ROCURONIUM BROMIDE 50 MG: 10 INJECTION, SOLUTION INTRAVENOUS at 15:27

## 2022-09-14 RX ADMIN — ZONISAMIDE 100 MG: 50 CAPSULE ORAL at 20:25

## 2022-09-14 RX ADMIN — SODIUM CHLORIDE: 9 INJECTION, SOLUTION INTRAVENOUS at 10:23

## 2022-09-14 RX ADMIN — ATORVASTATIN CALCIUM 40 MG: 40 TABLET, FILM COATED ORAL at 20:24

## 2022-09-14 RX ADMIN — ONDANSETRON 4 MG: 2 INJECTION INTRAMUSCULAR; INTRAVENOUS at 17:25

## 2022-09-14 RX ADMIN — LIDOCAINE HYDROCHLORIDE 50 MG: 20 INJECTION, SOLUTION EPIDURAL; INFILTRATION; INTRACAUDAL at 15:27

## 2022-09-14 RX ADMIN — FENTANYL CITRATE 100 MCG: 50 INJECTION, SOLUTION INTRAMUSCULAR; INTRAVENOUS at 17:21

## 2022-09-14 RX ADMIN — SUGAMMADEX 200 MG: 100 INJECTION, SOLUTION INTRAVENOUS at 17:26

## 2022-09-14 RX ADMIN — DOCUSATE SODIUM 50 MG AND SENNOSIDES 8.6 MG 2 TABLET: 8.6; 5 TABLET, FILM COATED ORAL at 19:46

## 2022-09-14 RX ADMIN — GABAPENTIN 100 MG: 100 CAPSULE ORAL at 19:46

## 2022-09-14 RX ADMIN — FENTANYL CITRATE 50 MCG: 50 INJECTION, SOLUTION INTRAMUSCULAR; INTRAVENOUS at 18:10

## 2022-09-14 RX ADMIN — OXYCODONE 5 MG: 5 TABLET ORAL at 21:38

## 2022-09-14 RX ADMIN — CEFAZOLIN 2 G: 330 INJECTION, POWDER, FOR SOLUTION INTRAMUSCULAR; INTRAVENOUS at 15:26

## 2022-09-14 RX ADMIN — GABAPENTIN 100 MG: 100 CAPSULE ORAL at 12:20

## 2022-09-14 RX ADMIN — PROPOFOL 150 MG: 10 INJECTION, EMULSION INTRAVENOUS at 15:27

## 2022-09-14 RX ADMIN — MORPHINE SULFATE 4 MG: 4 INJECTION INTRAVENOUS at 20:24

## 2022-09-14 RX ADMIN — EPHEDRINE SULFATE 10 MG: 50 INJECTION INTRAMUSCULAR; INTRAVENOUS; SUBCUTANEOUS at 15:45

## 2022-09-14 RX ADMIN — ACETAMINOPHEN 1000 MG: 500 TABLET ORAL at 14:34

## 2022-09-14 RX ADMIN — SODIUM CHLORIDE: 9 INJECTION, SOLUTION INTRAVENOUS at 15:21

## 2022-09-14 RX ADMIN — SODIUM CHLORIDE: 9 INJECTION, SOLUTION INTRAVENOUS at 19:12

## 2022-09-14 RX ADMIN — INSULIN HUMAN 2 UNITS: 100 INJECTION, SOLUTION PARENTERAL at 21:28

## 2022-09-14 RX ADMIN — METOCLOPRAMIDE 10 MG: 5 INJECTION, SOLUTION INTRAMUSCULAR; INTRAVENOUS at 17:26

## 2022-09-14 RX ADMIN — INSULIN HUMAN 2 UNITS: 100 INJECTION, SOLUTION PARENTERAL at 09:12

## 2022-09-14 ASSESSMENT — PAIN DESCRIPTION - PAIN TYPE
TYPE: ACUTE PAIN

## 2022-09-14 ASSESSMENT — ENCOUNTER SYMPTOMS
WEAKNESS: 1
CHILLS: 0
FEVER: 0
NAUSEA: 0
MYALGIAS: 1
HEARTBURN: 0
DIZZINESS: 0
ABDOMINAL PAIN: 0
SEIZURES: 0
COUGH: 0
VOMITING: 0
FLANK PAIN: 0
HEADACHES: 0
BLURRED VISION: 0

## 2022-09-14 NOTE — PROGRESS NOTES
Critical Care Progress Note    Date of admission  9/12/2022    Chief Complaint  82 y.o. male admitted 9/12/2022 post GLF complaining of right shoulder and hip pain    Hospital Course  82 yom with a PMHx of cardiac stent placement (approximately 10 years ago)  primary HTN, BPH, diabetes type II, who presented to the ED at [facility] as a transfer from outside facility with a chief complaint of headache, right shoulder and hip pain after falling off of his toilet 2 days ago.  He denies any LOC, chest pain or shortness of breath. He does state that he has had chronic right-sided weakness, as well as decreased sensation to his right side for the past year or so that has not improved or worsened.   Shoulder and hip films at outlying facility were found to be negative for any fractures.  CT of head showed a 2.1 cm subdural hematoma in the right frontal area with a 1 mm right to left midline shift, therefore he  was transferred to [facility] for neurosurgical intervention and critical care management.      Interval Problem Update  Reviewed last 24 hour events:  -No events overnight  -- Neuro assessment- No changes- slight decrease in strenght and sensation to right extrem   -Required hydralazine last night and this a.m. to keep SBP within parameter goal of less than 160  -NPO last night and this a.m. for craniotomy, evacuation of hematoma with membrane resection scheduled for tomorrow   I-/O - 24 hr +360 since admit - 670  -DVT PPX- SCDs  -T max 97.7  -NS @ 100- while NPO, and until oral intake increases post op    Review of Systems  Review of Systems   Constitutional:  Negative for chills, fever and malaise/fatigue.   Eyes:  Negative for blurred vision.   Respiratory:  Negative for cough.    Cardiovascular:  Negative for chest pain.   Gastrointestinal:  Negative for abdominal pain, heartburn, nausea and vomiting.   Genitourinary:  Positive for frequency. Negative for dysuria and flank pain.        Not eating   "  Musculoskeletal:  Positive for myalgias.   Neurological:  Positive for weakness. Negative for dizziness, seizures and headaches.        \"Left side is weak\"      Vital Signs for last 24 hours   Temp:  [36.1 °C (97 °F)-36.5 °C (97.7 °F)] 36.5 °C (97.7 °F)  Pulse:  [] 78  Resp:  [12-24] 15  BP: (106-167)/(56-92) 145/66  SpO2:  [95 %-100 %] 96 %    Hemodynamic parameters for last 24 hours       Respiratory Information for the last 24 hours       Physical Exam   Physical Exam  Vitals and nursing note reviewed.   Constitutional:       General: He is awake.      Appearance: Normal appearance.   HENT:      Head: Normocephalic.      Mouth/Throat:      Mouth: Mucous membranes are moist.   Eyes:      General: Lids are normal.      Pupils: Pupils are equal, round, and reactive to light.   Neck:      Comments: Trach midline  Cardiovascular:      Rate and Rhythm: Regular rhythm. Tachycardia present.      Pulses:           Radial pulses are 2+ on the right side and 2+ on the left side.   Pulmonary:      Effort: Pulmonary effort is normal.      Breath sounds: Normal breath sounds and air entry.      Comments: Without any SOB, respiratory distress  Abdominal:      General: Bowel sounds are normal.      Palpations: Abdomen is soft.   Genitourinary:     Comments: Voiding clear yellow urine  Skin:     General: Skin is warm and dry.      Capillary Refill: Capillary refill takes less than 2 seconds.   Neurological:      Mental Status: He is alert and oriented to person, place, and time. Mental status is at baseline.      GCS: GCS eye subscore is 4. GCS verbal subscore is 5. GCS motor subscore is 6.      Comments: AAO X 4   NEDA 3 mm  MONTEMAYOR spontaneously- Left extrem slightly stronger than right extrem   Slgiht decrease in sensation leftt extrem.       Psychiatric:         Attention and Perception: Attention normal.         Mood and Affect: Mood normal.         Speech: Speech normal.         Behavior: Behavior normal. "       Medications  Current Facility-Administered Medications   Medication Dose Route Frequency Provider Last Rate Last Admin    NS infusion   Intravenous Continuous STEPHANIE EcholsRIsabelNIsabel 100 mL/hr at 09/14/22 1200 Rate Change at 09/14/22 1200    [MAR Hold] senna-docusate (PERICOLACE or SENOKOT S) 8.6-50 MG per tablet 2 Tablet  2 Tablet Oral BID Dc Leiva M.D.   2 Tablet at 09/13/22 0501    And    [MAR Hold] polyethylene glycol/lytes (MIRALAX) PACKET 1 Packet  1 Packet Oral QDAY PRN Dc Leiva M.D.        And    [MAR Hold] magnesium hydroxide (MILK OF MAGNESIA) suspension 30 mL  30 mL Oral QDAY PRN Dc Leiva M.D.        And    [MAR Hold] bisacodyl (DULCOLAX) suppository 10 mg  10 mg Rectal QDAY PRN Dc Leiva M.D.        [MAR Hold] Respiratory Therapy Consult   Nebulization Continuous RT Dc Leiva M.D.        [MAR Hold] ondansetron (ZOFRAN) syringe/vial injection 4 mg  4 mg Intravenous Q4HRS PRN Dc Leiva M.D.        [MAR Hold] ondansetron (ZOFRAN ODT) dispertab 4 mg  4 mg Oral Q4HRS PRN Dc Leiva M.D.        [MAR Hold] amLODIPine (NORVASC) tablet 10 mg  10 mg Oral DAILY Dc Leiva M.D.   10 mg at 09/13/22 0501    [MAR Hold] atorvastatin (LIPITOR) tablet 40 mg  40 mg Oral Nightly Dc Leiva M.D.   40 mg at 09/13/22 2120    [MAR Hold] gabapentin (NEURONTIN) capsule 100 mg  100 mg Oral TID Dc Leiva M.D.   100 mg at 09/14/22 1220    [MAR Hold] zonisamide (ZONEGRAN) capsule 100 mg  100 mg Oral QHS Dc Leiva M.D.   100 mg at 09/13/22 2119    [MAR Hold] hydrALAZINE (APRESOLINE) injection 20 mg  20 mg Intravenous Q4HRS PRN Dc Leiva M.D.   20 mg at 09/13/22 1810    [MAR Hold] labetalol (NORMODYNE/TRANDATE) injection 10-20 mg  10-20 mg Intravenous Q4HRS PRN Dc Leiva M.D.        [MAR Hold] insulin regular (HumuLIN R,NovoLIN R) injection  2-9 Units Subcutaneous  4X/DAY ACHS Dc Leiva M.D.   2 Units at 09/14/22 0912    And    [MAR Hold] dextrose 10 % BOLUS 25 g  25 g Intravenous Q15 MIN PRN Dc Leiva M.D.        [MAR Hold] MD Alert...ICU Electrolyte Replacement per Pharmacy   Other PHARMACY TO DOSE Dc Leiva M.D.        [MAR Hold] acetaminophen (Tylenol) tablet 650 mg  650 mg Oral Q4HRS PRN Giselle LUCILLE. Latona   650 mg at 09/13/22 0501       Fluids    Intake/Output Summary (Last 24 hours) at 9/14/2022 1457  Last data filed at 9/14/2022 1400  Gross per 24 hour   Intake 1111.67 ml   Output 950 ml   Net 161.67 ml       Laboratory          Recent Labs     09/12/22 1429 09/13/22 0333 09/14/22  0606   SODIUM 137 136 139   POTASSIUM 4.6 4.1 4.5   CHLORIDE 103 103 102   CO2 27 25 25   BUN 18 24* 29*   CREATININE 0.97 1.06 1.11   MAGNESIUM  --  1.9 2.3   PHOSPHORUS  --  3.6 3.5   CALCIUM 8.6 9.0 9.2     Recent Labs     09/12/22 1429 09/13/22 0333 09/14/22  0606   ALTSGPT 15  --   --    ASTSGOT 22  --   --    ALKPHOSPHAT 74  --   --    TBILIRUBIN 0.3  --   --    GLUCOSE 186* 178* 185*     Recent Labs     09/12/22 1429 09/13/22 0333 09/14/22  0606   WBC 9.1 9.7 10.4   NEUTSPOLYS  --  50.70 71.30   LYMPHOCYTES  --  31.20 25.20   MONOCYTES  --  10.50 0.90   EOSINOPHILS  --  6.00 0.90   BASOPHILS  --  1.10 1.70   ASTSGOT 22  --   --    ALTSGPT 15  --   --    ALKPHOSPHAT 74  --   --    TBILIRUBIN 0.3  --   --      Recent Labs     09/12/22 1429 09/13/22 0333 09/14/22  0606   RBC 3.65* 3.87* 4.03*   HEMOGLOBIN 11.4* 11.8* 12.5*   HEMATOCRIT 33.4* 36.5* 38.0*   PLATELETCT 349 340 378   PROTHROMBTM 13.9  --   --    APTT 33.8  --   --    INR 1.08  --   --        Imaging  X-Ray:  I have personally reviewed the images and compared with prior images.  CT:    Reviewed    Assessment/Plan  * Subdural hematoma (HCC)- (present on admission)  Assessment & Plan  Imaging reveals right-sided subdural hematoma with mass-effect  Neuro checks every 4 hours  -  craniotomy with hematoma removal,membrane resection scheduled for tomorrow Wednesday 9/14  NPO  Strict blood pressure control with goal SBP less than 160-received 2 doses of hydralazine last p.m. as needed to keep within goal BP  SCDs only no chem DVT ppx  No asa  Okay to mobilize to chair with meals  Maintain eunatremic, euglycemic, normothermic-we will start normal saline at 100 cc an hour while n.p.o.and until taking good PO  -follow cmp    Electrolyte abnormality  Assessment & Plan  Active replacement of Mag- will continue to follow daily labs- cmp and replace as needed      Primary hypertension  Assessment & Plan  Goal SBP less than 160  Continue amlodipine, 10 mg daily    Type 2 diabetes mellitus (HCC)- (present on admission)  Assessment & Plan  -Glycohemoglobin- 8.6  -Sliding scale insulin- adjust as needed  -Monitor glucose with FSBS and CMP/BMP          VTE:  Contraindicated  Ulcer: Not Indicated  Lines: None    I have performed a physical exam and reviewed and updated ROS and Plan today (9/14/2022). In review of yesterday's note (9/13/2022), there are no changes except as documented above.     Discussed patient condition and risk of morbidity and/or mortality with RN, RT, Pharmacy, Charge nurse / hot rounds, Patient, and neurosurgery  The patient remains critically ill.  Critical care time = 35 minutes in directly providing and coordinating critical care and extensive data review.  No time overlap and excludes procedures.

## 2022-09-14 NOTE — PROGRESS NOTES
Neurosurgery Progress Note    Subjective:  No events overnight   No complaints   Denies HA, vision change, paralysis     Exam:  AAO x3, fluent speech   3 PERRl, EOMI   -drift, tongue midline   MONTEMAYOR, FAC   Sensation intact throughout   NPO     BP  Min: 106/56  Max: 167/77  Pulse  Av.4  Min: 74  Max: 110  Resp  Avg: 15.8  Min: 12  Max: 24  Temp  Av.4 °C (97.5 °F)  Min: 36.1 °C (97 °F)  Max: 36.5 °C (97.7 °F)  SpO2  Av %  Min: 95 %  Max: 100 %    No data recorded    Recent Labs     22  14222  0333 09/14/22  0606   WBC 9.1 9.7 10.4   RBC 3.65* 3.87* 4.03*   HEMOGLOBIN 11.4* 11.8* 12.5*   HEMATOCRIT 33.4* 36.5* 38.0*   MCV 91.5 94.3 94.3   MCH 31.2 30.5 31.0   MCHC 34.1 32.3* 32.9*   RDW 42.0 42.9 43.3   PLATELETCT 349 340 378   MPV 10.6 10.7 10.4       Recent Labs     22  14222  0333 22  0606   SODIUM 137 136 139   POTASSIUM 4.6 4.1 4.5   CHLORIDE 103 103 102   CO2 27 25 25   GLUCOSE 186* 178* 185*   BUN 18 24* 29*   CREATININE 0.97 1.06 1.11   CALCIUM 8.6 9.0 9.2       Recent Labs     22  1429   APTT 33.8   INR 1.08       Recent Labs     22  1429   REACTMIN 5.8   CLOTKINET 1.0   CLOTANGL 75.7   MAXCLOTS 68.4   [id number] 0.3   PRCINADP 9.4   PRCINAA 8.2         Intake/Output                         22 - 22 - 09/15/22 0659      Total [id number] Total                 Intake    P.O.  900  50 950  --  -- --    P.O. 900 50 950 -- -- --    I.V.  50  -- 50  --  -- --    Magnesium Sulfate Volume 50 -- 50 -- -- --    Total Intake 238 05 2477 -- -- --       Output    Urine  550  600 1150  --  -- --    Number of Times Voided 5 x 4 x 9 x -- -- --    Number of Times Incontinent of Urine -- 1 x 1 x -- -- --    Urine Void (mL)  -- -- --    Stool  --  -- --  --  -- --    Number of Times Stooled -- 0 x 0 x -- -- --    Total Output  -- -- --       Net I/O     400 -550 -150 -- -- --               Intake/Output Summary (Last 24 hours) at 9/14/2022 1034  Last data filed at 9/14/2022 0600  Gross per 24 hour   Intake 590 ml   Output 900 ml   Net -310 ml               NS   Continuous    senna-docusate  2 Tablet BID    And    polyethylene glycol/lytes  1 Packet QDAY PRN    And    magnesium hydroxide  30 mL QDAY PRN    And    bisacodyl  10 mg QDAY PRN    Respiratory Therapy Consult   Continuous RT    ondansetron  4 mg Q4HRS PRN    ondansetron  4 mg Q4HRS PRN    amLODIPine  10 mg DAILY    atorvastatin  40 mg Nightly    gabapentin  100 mg TID    zonisamide  100 mg QHS    hydrALAZINE  20 mg Q4HRS PRN    labetalol  10-20 mg Q4HRS PRN    insulin regular  2-9 Units 4X/DAY ACHS    And    dextrose bolus  25 g Q15 MIN PRN    MD Alert...Adult ICU Electrolyte Replacement per Pharmacy   PHARMACY TO DOSE    acetaminophen  650 mg Q4HRS PRN       Assessment and Plan:  Hospital day #3  POD #NA   Prophylactic anticoagulation: no         Start date/time: TBD     Plan:  Stable  OR Today @1400 for Right craniotomy, evacuation of hematoma, NPO   NO asa or anticoagulants   Labs stable, keep eunatremic

## 2022-09-14 NOTE — DISCHARGE PLANNING
"Chart review and assessment complete.     HPI: \"Alec Howell is a 82 y.o. male who  fell off his toilet two days ago and hit his head. He presented to [facility] today for evaluation of a headache, right shoulder pain, and right hip pain. CT revealed the patient had a 2.1 cm subdural hematoma in the right frontal area and a 1 mm right to left midline shift. He was transferred to [facility] for further evaluation and care. He was noted to be  AAOx4 with a GCS of 15 and moving all extremities in theER.  The patient denies any loss of consciousness. His headache has resolved. He still complains of right hip and shoulder pain. However Shoulder and Hip x-rays at [facility] indicated no abnormalities. He has a history of a stent placement, hypertension, and diabetes. And takes ASA.\"     Patient lives with his adult son in [place], [place]. Son is involved with care. No ACP documents on file. Prior to admission, pt was independent. He previously was on service with [facility] . PCP is Bird Marin and pharmacy is [facility] in [place].     Plan for OR today with Dr. Gleason. Final disposition unknown however it is likely that patient will require some level of post-acute placement.     No social determinants of health noted.  No SS consults placed.     Plan: Continue to follow and assist with social/dc needs     Care Transition Team Assessment    Information Source  Orientation Level: Oriented X4 (Per RN's assessment)  Information Given By: Other (Comments)  Informant's Name: EMR  Who is responsible for making decisions for patient? : Patient    Readmission Evaluation  Is this a readmission?: No    Elopement Risk  Legal Hold: No  Ambulatory or Self Mobile in Wheelchair: No-Not an Elopement Risk  Elopement Risk: Not at Risk for Elopement    Interdisciplinary Discharge Planning  Primary Care Physician: Bird Marin  Lives with - Patient's Self Care Capacity: Adult Children  Patient or legal guardian wants to designate a " caregiver: No    Discharge Preparedness  What is your plan after discharge?: Uncertain - pending medical team collaboration  What are your discharge supports?: Child  Prior Functional Level: Ambulatory, Independent with Activities of Daily Living, Independent with Medication Management    Functional Assesment  Prior Functional Level: Ambulatory, Independent with Activities of Daily Living, Independent with Medication Management    Finances  Financial Barriers to Discharge: No  Prescription Coverage: Yes    Vision / Hearing Impairment  Vision Impairment : Yes  Right Eye Vision: Impaired, Wears Glasses  Left Eye Vision: Impaired, Wears Glasses  Hearing Impairment : Yes  Hearing Impairment: Both Ears, Hearing Device Not Available  Does Pt Need Special Equipment for the Hearing Impaired?: No    Domestic Abuse  Have you ever been the victim of abuse or violence?: No  Physical Abuse or Sexual Abuse: No  Verbal Abuse or Emotional Abuse: No  Possible Abuse/Neglect Reported to:: Not Applicable    Psychological Assessment  History of Substance Abuse: None  History of Psychiatric Problems: No  Non-compliant with Treatment: No  Newly Diagnosed Illness: Yes    Discharge Risks or Barriers  Discharge risks or barriers?: Post-acute placement / services, Complex medical needs  Patient risk factors: Complex medical needs    Anticipated Discharge Information  Discharge Disposition: Disch to  rehab facility or distinct part unit

## 2022-09-14 NOTE — ANESTHESIA PREPROCEDURE EVALUATION
Case: [id number] Date/Time: 09/14/22 1345    Procedure: RIGHT CRANIOTOMY FOR SUBDURAL HEMATOMA    Location: [facility] OR 04 / SURGERY [facility]    Surgeons: Raúl Gleason M.D.          Relevant Problems   CARDIAC   (positive) Primary hypertension      ENDO   (positive) Type 2 diabetes mellitus (HCC)       Physical Exam    Airway   Mallampati: II  TM distance: >3 FB  Neck ROM: full       Cardiovascular - normal exam  Rhythm: regular  Rate: normal  (-) murmur     Dental - normal exam           Pulmonary - normal exam  Breath sounds clear to auscultation     Abdominal    Neurological - normal exam                 Anesthesia Plan    ASA 3       Plan - general       Airway plan will be ETT          Induction: intravenous      Pertinent diagnostic labs and testing reviewed    Informed Consent:    Anesthetic plan and risks discussed with patient.

## 2022-09-14 NOTE — CARE PLAN
The patient is Stable - Low risk of patient condition declining or worsening    Shift Goals  Clinical Goals: Q4 neuro  Patient Goals: Sleep  Family Goals: EMMANUEL    Progress made toward(s) clinical / shift goals:      Problem: Knowledge Deficit - Standard  Goal: Patient and family/care givers will demonstrate understanding of plan of care, disease process/condition, diagnostic tests and medications  Outcome: Progressing     Problem: Skin Integrity  Goal: Skin integrity is maintained or improved  Outcome: Progressing     Problem: Fall Risk  Goal: Patient will remain free from falls  Outcome: Progressing     Problem: Pain - Standard  Goal: Alleviation of pain or a reduction in pain to the patient’s comfort goal  Outcome: Progressing       Patient is not progressing towards the following goals:  NA

## 2022-09-14 NOTE — PROGRESS NOTES
1415 - Patient transferred to PreOp area accompanied by RN and transport. Vitals stable during move. Report given to preop team, care transitioned over to Preop staff.

## 2022-09-14 NOTE — ANESTHESIA PROCEDURE NOTES
Airway    Date/Time: 9/14/2022 3:27 PM  Performed by: Jabari Frank M.D.  Authorized by: Jabari Frank M.D.     Location:  OR  Urgency:  Elective  Indications for Airway Management:  Anesthesia      Spontaneous Ventilation: absent    Sedation Level:  Deep  Preoxygenated: Yes    Patient Position:  Sniffing  Final Airway Type:  Endotracheal airway  Final Endotracheal Airway:  ETT  Cuffed: Yes    Technique Used for Successful ETT Placement:  Direct laryngoscopy    Insertion Site:  Oral  Blade Type:  [place]  Laryngoscope Blade/Videolaryngoscope Blade Size:  3  ETT Size (mm):  8.0  Measured from:  Teeth  ETT to Teeth (cm):  24  Placement Verified by: auscultation and capnometry    Cormack-Lehane Classification:  Grade I - full view of glottis  Number of Attempts at Approach:  1

## 2022-09-15 ENCOUNTER — APPOINTMENT (OUTPATIENT)
Dept: RADIOLOGY | Facility: MEDICAL CENTER | Age: 82
DRG: 025 | End: 2022-09-15
Attending: NEUROLOGICAL SURGERY
Payer: MEDICARE

## 2022-09-15 PROBLEM — E43 SEVERE MALNUTRITION (HCC): Status: ACTIVE | Noted: 2022-09-15

## 2022-09-15 LAB
ANION GAP SERPL CALC-SCNC: 9 MMOL/L (ref 7–16)
BASOPHILS # BLD AUTO: 0.8 % (ref 0–1.8)
BASOPHILS # BLD: 0.11 K/UL (ref 0–0.12)
BUN SERPL-MCNC: 21 MG/DL (ref 8–22)
CALCIUM SERPL-MCNC: 8.8 MG/DL (ref 8.5–10.5)
CHLORIDE SERPL-SCNC: 105 MMOL/L (ref 96–112)
CO2 SERPL-SCNC: 24 MMOL/L (ref 20–33)
CREAT SERPL-MCNC: 1.09 MG/DL (ref 0.5–1.4)
EOSINOPHIL # BLD AUTO: 0.08 K/UL (ref 0–0.51)
EOSINOPHIL NFR BLD: 0.6 % (ref 0–6.9)
ERYTHROCYTE [DISTWIDTH] IN BLOOD BY AUTOMATED COUNT: 44.1 FL (ref 35.9–50)
GFR SERPLBLD CREATININE-BSD FMLA CKD-EPI: 68 ML/MIN/1.73 M 2
GLUCOSE BLD STRIP.AUTO-MCNC: 174 MG/DL (ref 65–99)
GLUCOSE BLD STRIP.AUTO-MCNC: 180 MG/DL (ref 65–99)
GLUCOSE BLD STRIP.AUTO-MCNC: 196 MG/DL (ref 65–99)
GLUCOSE BLD STRIP.AUTO-MCNC: 240 MG/DL (ref 65–99)
GLUCOSE SERPL-MCNC: 209 MG/DL (ref 65–99)
HCT VFR BLD AUTO: 35.1 % (ref 42–52)
HGB BLD-MCNC: 11.3 G/DL (ref 14–18)
IMM GRANULOCYTES # BLD AUTO: 0.13 K/UL (ref 0–0.11)
IMM GRANULOCYTES NFR BLD AUTO: 0.9 % (ref 0–0.9)
LYMPHOCYTES # BLD AUTO: 1.48 K/UL (ref 1–4.8)
LYMPHOCYTES NFR BLD: 10.7 % (ref 22–41)
MAGNESIUM SERPL-MCNC: 1.9 MG/DL (ref 1.5–2.5)
MCH RBC QN AUTO: 30.9 PG (ref 27–33)
MCHC RBC AUTO-ENTMCNC: 32.2 G/DL (ref 33.7–35.3)
MCV RBC AUTO: 95.9 FL (ref 81.4–97.8)
MONOCYTES # BLD AUTO: 0.81 K/UL (ref 0–0.85)
MONOCYTES NFR BLD AUTO: 5.9 % (ref 0–13.4)
NEUTROPHILS # BLD AUTO: 11.17 K/UL (ref 1.82–7.42)
NEUTROPHILS NFR BLD: 81.1 % (ref 44–72)
NRBC # BLD AUTO: 0 K/UL
NRBC BLD-RTO: 0 /100 WBC
PATHOLOGY CONSULT NOTE: NORMAL
PHOSPHATE SERPL-MCNC: 3.9 MG/DL (ref 2.5–4.5)
PLATELET # BLD AUTO: 336 K/UL (ref 164–446)
PMV BLD AUTO: 11.1 FL (ref 9–12.9)
POTASSIUM SERPL-SCNC: 4.3 MMOL/L (ref 3.6–5.5)
RBC # BLD AUTO: 3.66 M/UL (ref 4.7–6.1)
SODIUM SERPL-SCNC: 138 MMOL/L (ref 135–145)
WBC # BLD AUTO: 13.8 K/UL (ref 4.8–10.8)

## 2022-09-15 PROCEDURE — 99291 CRITICAL CARE FIRST HOUR: CPT | Performed by: NURSE PRACTITIONER

## 2022-09-15 PROCEDURE — 700111 HCHG RX REV CODE 636 W/ 250 OVERRIDE (IP): Performed by: NURSE PRACTITIONER

## 2022-09-15 PROCEDURE — 700102 HCHG RX REV CODE 250 W/ 637 OVERRIDE(OP): Performed by: INTERNAL MEDICINE

## 2022-09-15 PROCEDURE — 700102 HCHG RX REV CODE 250 W/ 637 OVERRIDE(OP): Performed by: NURSE PRACTITIONER

## 2022-09-15 PROCEDURE — A9270 NON-COVERED ITEM OR SERVICE: HCPCS | Performed by: INTERNAL MEDICINE

## 2022-09-15 PROCEDURE — A9270 NON-COVERED ITEM OR SERVICE: HCPCS | Performed by: NURSE PRACTITIONER

## 2022-09-15 PROCEDURE — 82962 GLUCOSE BLOOD TEST: CPT

## 2022-09-15 PROCEDURE — 97162 PT EVAL MOD COMPLEX 30 MIN: CPT

## 2022-09-15 PROCEDURE — 770022 HCHG ROOM/CARE - ICU (200)

## 2022-09-15 PROCEDURE — 83735 ASSAY OF MAGNESIUM: CPT

## 2022-09-15 PROCEDURE — 70450 CT HEAD/BRAIN W/O DYE: CPT

## 2022-09-15 PROCEDURE — 92610 EVALUATE SWALLOWING FUNCTION: CPT

## 2022-09-15 PROCEDURE — 80048 BASIC METABOLIC PNL TOTAL CA: CPT

## 2022-09-15 PROCEDURE — 700111 HCHG RX REV CODE 636 W/ 250 OVERRIDE (IP): Performed by: INTERNAL MEDICINE

## 2022-09-15 PROCEDURE — 97167 OT EVAL HIGH COMPLEX 60 MIN: CPT

## 2022-09-15 PROCEDURE — 700105 HCHG RX REV CODE 258: Performed by: NURSE PRACTITIONER

## 2022-09-15 PROCEDURE — 700101 HCHG RX REV CODE 250: Performed by: INTERNAL MEDICINE

## 2022-09-15 PROCEDURE — 85025 COMPLETE CBC W/AUTO DIFF WBC: CPT

## 2022-09-15 PROCEDURE — 84100 ASSAY OF PHOSPHORUS: CPT

## 2022-09-15 RX ORDER — PHENYLEPHRINE HCL IN 0.9% NACL 0.5 MG/5ML
5 SYRINGE (ML) INTRAVENOUS ONCE
Status: DISCONTINUED | OUTPATIENT
Start: 2022-09-15 | End: 2022-09-15 | Stop reason: CLARIF

## 2022-09-15 RX ORDER — GAUZE BANDAGE 2" X 2"
100 BANDAGE TOPICAL DAILY
Status: DISCONTINUED | OUTPATIENT
Start: 2022-09-15 | End: 2022-09-27 | Stop reason: HOSPADM

## 2022-09-15 RX ORDER — MAGNESIUM SULFATE HEPTAHYDRATE 40 MG/ML
2 INJECTION, SOLUTION INTRAVENOUS ONCE
Status: COMPLETED | OUTPATIENT
Start: 2022-09-15 | End: 2022-09-15

## 2022-09-15 RX ADMIN — SODIUM CHLORIDE: 9 INJECTION, SOLUTION INTRAVENOUS at 18:05

## 2022-09-15 RX ADMIN — ONDANSETRON 4 MG: 2 INJECTION INTRAMUSCULAR; INTRAVENOUS at 04:26

## 2022-09-15 RX ADMIN — DOCUSATE SODIUM 50 MG AND SENNOSIDES 8.6 MG 2 TABLET: 8.6; 5 TABLET, FILM COATED ORAL at 17:08

## 2022-09-15 RX ADMIN — INSULIN HUMAN 3 UNITS: 100 INJECTION, SOLUTION PARENTERAL at 21:11

## 2022-09-15 RX ADMIN — GABAPENTIN 100 MG: 100 CAPSULE ORAL at 17:08

## 2022-09-15 RX ADMIN — Medication 100 MG: at 08:07

## 2022-09-15 RX ADMIN — MORPHINE SULFATE 4 MG: 4 INJECTION INTRAVENOUS at 08:14

## 2022-09-15 RX ADMIN — ACETAMINOPHEN 650 MG: 325 TABLET, FILM COATED ORAL at 08:14

## 2022-09-15 RX ADMIN — AMLODIPINE BESYLATE 10 MG: 10 TABLET ORAL at 06:07

## 2022-09-15 RX ADMIN — INSULIN HUMAN 2 UNITS: 100 INJECTION, SOLUTION PARENTERAL at 08:21

## 2022-09-15 RX ADMIN — ZONISAMIDE 100 MG: 50 CAPSULE ORAL at 21:06

## 2022-09-15 RX ADMIN — INSULIN HUMAN 2 UNITS: 100 INJECTION, SOLUTION PARENTERAL at 10:57

## 2022-09-15 RX ADMIN — MORPHINE SULFATE 4 MG: 4 INJECTION INTRAVENOUS at 03:30

## 2022-09-15 RX ADMIN — INSULIN HUMAN 2 UNITS: 100 INJECTION, SOLUTION PARENTERAL at 16:23

## 2022-09-15 RX ADMIN — LABETALOL HYDROCHLORIDE 10 MG: 5 INJECTION, SOLUTION INTRAVENOUS at 19:15

## 2022-09-15 RX ADMIN — MAGNESIUM SULFATE HEPTAHYDRATE 2 G: 40 INJECTION, SOLUTION INTRAVENOUS at 10:55

## 2022-09-15 RX ADMIN — ACETAMINOPHEN 650 MG: 325 TABLET, FILM COATED ORAL at 21:07

## 2022-09-15 RX ADMIN — GABAPENTIN 100 MG: 100 CAPSULE ORAL at 06:07

## 2022-09-15 RX ADMIN — ATORVASTATIN CALCIUM 40 MG: 40 TABLET, FILM COATED ORAL at 21:07

## 2022-09-15 RX ADMIN — MORPHINE SULFATE 4 MG: 4 INJECTION INTRAVENOUS at 22:15

## 2022-09-15 RX ADMIN — GABAPENTIN 100 MG: 100 CAPSULE ORAL at 12:02

## 2022-09-15 RX ADMIN — OXYCODONE 5 MG: 5 TABLET ORAL at 06:06

## 2022-09-15 RX ADMIN — THERA TABS 1 TABLET: TAB at 08:06

## 2022-09-15 RX ADMIN — POLYETHYLENE GLYCOL 3350 1 PACKET: 17 POWDER, FOR SOLUTION ORAL at 15:17

## 2022-09-15 ASSESSMENT — COGNITIVE AND FUNCTIONAL STATUS - GENERAL
DAILY ACTIVITIY SCORE: 18
MOVING TO AND FROM BED TO CHAIR: A LITTLE
WALKING IN HOSPITAL ROOM: A LITTLE
SUGGESTED CMS G CODE MODIFIER DAILY ACTIVITY: CK
TOILETING: A LITTLE
MOBILITY SCORE: 19
MOVING TO AND FROM BED TO CHAIR: A LITTLE
WALKING IN HOSPITAL ROOM: A LITTLE
MOBILITY SCORE: 17
SUGGESTED CMS G CODE MODIFIER MOBILITY: CK
MOVING FROM LYING ON BACK TO SITTING ON SIDE OF FLAT BED: A LITTLE
STANDING UP FROM CHAIR USING ARMS: A LITTLE
DAILY ACTIVITIY SCORE: 21
MOVING FROM LYING ON BACK TO SITTING ON SIDE OF FLAT BED: A LITTLE
DRESSING REGULAR LOWER BODY CLOTHING: A LITTLE
CLIMB 3 TO 5 STEPS WITH RAILING: A LOT
TOILETING: A LITTLE
SUGGESTED CMS G CODE MODIFIER MOBILITY: CK
HELP NEEDED FOR BATHING: A LITTLE
TURNING FROM BACK TO SIDE WHILE IN FLAT BAD: A LITTLE
HELP NEEDED FOR BATHING: A LOT
CLIMB 3 TO 5 STEPS WITH RAILING: A LITTLE
DRESSING REGULAR UPPER BODY CLOTHING: A LITTLE
SUGGESTED CMS G CODE MODIFIER DAILY ACTIVITY: CJ
DRESSING REGULAR LOWER BODY CLOTHING: A LOT
STANDING UP FROM CHAIR USING ARMS: A LITTLE

## 2022-09-15 ASSESSMENT — ENCOUNTER SYMPTOMS
MYALGIAS: 1
COUGH: 0
DIZZINESS: 0
ABDOMINAL PAIN: 0
FLANK PAIN: 0
NAUSEA: 0
SEIZURES: 0
HEADACHES: 0
BLURRED VISION: 0
WEAKNESS: 0
CHILLS: 0
VOMITING: 0
HEARTBURN: 0
FEVER: 0

## 2022-09-15 ASSESSMENT — PAIN DESCRIPTION - PAIN TYPE
TYPE: ACUTE PAIN

## 2022-09-15 ASSESSMENT — GAIT ASSESSMENTS
DISTANCE (FEET): 90
GAIT LEVEL OF ASSIST: MINIMAL ASSIST
DEVIATION: ANTALGIC
ASSISTIVE DEVICE: FRONT WHEEL WALKER

## 2022-09-15 ASSESSMENT — ACTIVITIES OF DAILY LIVING (ADL): TOILETING: INDEPENDENT

## 2022-09-15 NOTE — PROGRESS NOTES
Neurosurgery APRN at bedside addressing surgical dressing with sanguinous drainage oozing from incision. APRN pulled off surgical dressing and instructed writer to cleanse site with chloraprep and cover with gauze and kerlix. Dressing changed per verbal order. No evidence of drainage from site following dressing change.

## 2022-09-15 NOTE — DOCUMENTATION QUERY
[facility]                                                                       Query Response Note      PATIENT:               DAVID DE JESUS  ACCT #:                  [id number]  MRN:                     [id number]  :                      1940  ADMIT DATE:       2022 2:09 PM  DISCH DATE:          RESPONDING  PROVIDER #:        [id number]           QUERY TEXT:    Severe malnutrition is documented in the Registered Dietician belinda. Please specify if you:    NOTE:  If an appropriate response is not listed below, please respond with a new note.    Thank you.    The patient's Clinical Indicators include:   Critical Care Note: 82 y.o. male admitted post GLF. CT of head showed a subdural hematoma.   RD Note: Severe malnutrition, 10.3% severe weight loss in ~ 3 months, and severe muscle and severe fat wasting.     Risk factors: Severe muscle wasting, weight loss.    Treatment: RD consult, supplemental nutrition.    Thank you,  Lorna Castro  Clinical   Connect via [employer]  Options provided:   -- Agree with dietician  assessment of severe malnutrition   -- Disagree with dietician assessment of severe malnutrition   -- Other explanation, Please specify   -- Unable to determine      Query created by: Lorna Castro on 9/15/2022 6:21 AM    RESPONSE TEXT:    Agree with dietician assessment of severe malnutrition          Electronically signed by:  BECCA ZARAGOZA MD 9/15/2022 7:23 AM

## 2022-09-15 NOTE — ASSESSMENT & PLAN NOTE
-Start daily thiamine  -Nutritional consult has been done- follow nutritionalrecommendations  -Start Multivitamin  -dietary following

## 2022-09-15 NOTE — THERAPY
"Speech Language Pathology   Clinical Swallow Evaluation     Patient Name: Alec Howell  AGE:  82 y.o., SEX:  male  Medical Record #: [id number]  Today's Date: 9/15/2022     Precautions  Precautions: Fall Risk, Swallow Precautions ( See Comments)  Comments: s/p R crani w/ drain    HPI: Pt is a 82 y.o. male who fell off his toilet two days ago and hit his head. He presented to [facility] for evaluation of a headache, right shoulder pain, and right hip pain. The patient denies any loss of consciousness. s/p right craniotomy for SDH on 9/14. Drain in place. No hx of SLP in Epic.    CMHx: SDH, HTN  PMHx: DM, BPH    CXR 9/12:  \"1.  No acute cardiac or pulmonary abnormalities are identified.\"    CT Head w/o 9/15:  \"Postsurgical changes status post right-sided subdural hematoma evacuation with residual subdural hematoma and air and a subdural drain in place.     Multiple chronic bilateral lacunar infarcts.\"    Level of Consciousness: Alert, Awake  Affect/Behavior: Appropriate, Cooperative  Follows Directives: Yes  Orientation: Oriented x 4  Hearing: Functional hearing, hard of hearing  Vision: Functional vision      Prior Living Situation & Level of Function:  Pt reports RG7/TN0 diet at baseline. Of note, he reports some preference for softer foods because of occasionally choking episodes with \"potato chips, pork rinds.\"      Oral Mechanism Evaluation  Facial Symmetry: Equal  Facial Sensation: Equal  Labial Observations: L sided weakness  Lingual Observations:  Reduced movement to L side  Dentition: Good, Upper denture  Comments: Pt reports poor fit of dentures, not observed and w/o impact on pt performance      Voice  Quality: WFL  Resonance: WFL  Intensity: Appropriate  Cough: WFL  Comments: Pt reports pain w/ cued cough      Current Method of Nutrition   Oral diet (RG7/TN0)      Subjective  Pt awake and alert, sitting upright in chair upon SLP arrival. Pt w/ rebreather, but, per RN, not d/t O2 saturation concern and " "can be removed for SLP evaluation and pt PO intake. Pt agreeable and cooperative w/ all SLP evaluation tasks. Pt reports no cough/choke w/ thin liquids but occasional choking sensation w/ solid consistencies. Reports pain in TMJ w/ oral excursion and mastication.        Assessment  Positioning: Sitting [facility]  Bolus Administration: Patient  Oxygen Requirements: None  Factor(s) Affecting Performance: None    Swallowing Trials  Thin Liquid (TN0): WFL  Liquidised (LQ3): WFL  Regular (RG7): WFL    Comments:  Pt w/ appropriate oral phase. Adequate self-feeding, appropriate oral bolus stripping and containment, appropriate mastication. Good lingual manipulation of bolus w/o residue appreciated across consistencies trialed. However, pt reports pain in jaw when eating, worsened w/ regular consistency. Pt also reports pain given oral excursion. No overt s/sx of aspiration noted w/ single and sequential sips via straw. No increase in WOB, no cough/clear, no change in vocal quality. No s/sx concerning for impaired efficiency.    Clinical Impressions  Pt presents w/ functional swallow for PO diet. Given pain w/ mastication at this time, recommend diet downgrade. SLP explained MM5 vs. SB6; pt stated \"I only need food a little bit softer.\" Recommend SB6/TN0 w/ small bites. Pt will need reinforcement of small bite recommendation to maximize safety and minimize pain.        Recommendations  1.  Soft and bite-sized w/ thin liquids  2.  Instrumentation: None indicated at this time  3.  Swallowing Instructions & Precautions:   Supervision: Distant supervision - check on patient 2-3 times per meal, Monitor due to impulsive behavior  Positioning: Fully upright and midline during oral intake, Meals sitting upright in a chair, as tolerated  Medication: Whole with liquid, Whole with puree  Strategies: Small bites/sips, Slow rate of intake  Oral Care: BID  4.  Speech will follow to assess diet tolerance and readiness for upgrade. " "      Plan    Recommend Speech Therapy 2 times per week until therapy goals are met for the following treatments:  Dysphagia Training and Patient / Family / Caregiver Education.    Discharge Recommendations: (P) Anticipate that the patient will have no further speech therapy needs after discharge from the hospital       Objective   09/15/22 1144   Initial Contact Note    Initial Contact Note  Order Received and Verified, Speech Therapy Evaluation in Progress with Full Report to Follow.   Precautions   Precautions Fall Risk;Swallow Precautions ( See Comments)   Comments s/p R crani w/ drain   Vitals   O2 (LPM) 15   O2 Delivery Device Non-Rebreather Mask  (PER RN - NOT D/T OXYGEN NEEDS. Can be removed for PO.)   Pain 0 - 10 Group   Therapist Pain Assessment Nurse Notified;During Activity  (Unrated pain w/ oral excursion and mastication. Pain at location of TMJ)   Prior Living Situation   Prior Services None   Prior Level Of Function   Communication Within Functional Limits   Swallow Within Functional Limits   Dentures Uppers   Patient's Primary Language English   Patient / Family Goals   Patient / Family Goal #1 \"I don't want to choke\"   Short Term Goals   Short Term Goal # 1 Pt will consume diet of SB6/TN0 w/ no overt s/sx of aspiration or exerbation of pain.   Education Group   Education Provided Dysphagia;Role of Speech Therapy   Dysphagia Patient Response Patient;Acceptance;Explanation;Verbal Demonstration;Action Demonstration;Reinforcement Needed   Role of SLP Patient Response Patient;Acceptance;Explanation;Verbal Demonstration   Additional Comments Pt will need reinforcement for swallow precautions   Problem List   Problem List Dysphagia   Anticipated Discharge Needs   Discharge Recommendations Anticipate that the patient will have no further speech therapy needs after discharge from the hospital   Therapy Recommendations Upon DC Not Indicated   Interdisciplinary Plan of Care Collaboration   IDT Collaboration " with  Nursing;Occupational Therapist   Patient Position at End of Therapy Seated;Chair Alarm On;Tray Table within Reach;Call Light within Reach;Phone within Reach  (All verbalized needs met.)   Collaboration Comments RN aware of results and recommendations

## 2022-09-15 NOTE — OP REPORT
NEUROSURGERY OPERATIVE NOTE    9/14/2022 1730    Alec Howell [id number]    PROCEDURE:    1.  Right frontal parietal temporal craniotomy, evacuation acute, subacute subdural hematoma  2.  Resection subdural membrane  3.  Dural augmentation (Dura repair, Medtronic)  4.  Subdural drain placement  5.  Modifier 22: Significant increase in surgical time and technical difficulty given requirement for resection of extensive subdural membrane.  This required microsurgical dissection, increasing the surgical time by over 2 times expected time for subdural hematoma evacuation.    DIAGNOSIS:  Right frontal parietal temporal acute, subacute subdural Hematoma with extensive membrane formation    Surgeon:  Raúl Gleason MD, PhD  Assistant:  None    Anesthesia:  GETA    Indication: 82-year-old male who presents with headaches.  CT demonstrates a 2 to 2.5 cm right sided subacute and acute subdural hematoma with significant mass-effect on the cerebral hemisphere, with obvious membrane formation.  Given the size of the hematoma, and presence of significant subdural membrane formation, craniotomy with evacuation of the hematoma and resection of the membranes is planned.    Procedure:  The patient was identified in the holding area.  The surgical site was marked and consent obtained.  The patient was brought to the operating room and intubated by Anesthesia service.  2 gram Ancef was administered intravenously.  The patient was positioned supine on the operating room table with their head turned to the left supported on a Person horseshoe headholder.  A large gel roll was placed under his right shoulder.  The right scalp was prepped with hair clipping, chlorhexidine and betadine scrub, and Chloroprep.  Sterile drapes were placed including a layer of Ioban.  The planned curved incision just above the superior temporal line was infiltrated with 0.5% Marcaine with epinephrine.     The skin was incised sharply, dissection  was carried deeply using monopolar cautery.  The skin was elevated off the underlying skull and temporalis muscle using monopolar cautery.  The temporalis muscle was divided longitudinally using monopolar cautery, and elevated off the underlying skull in like manner.  Calumet retractors were used for tissue retraction.  4 bur holes were placed using the high-speed drill for with a skull  bit.  Bone edges were waxed, and the dura dissected from the overlying skull using a Penfield 3 dissector.  A craniotomy was created using the high-speed drill for with a B1 footplate attachment.  The skull flap was stored in antibiotic normal saline irrigation.  The dura was opened sharply leaving the extensive subdural membrane intact.  The dura was tacked up inferiorly using traction suture.  The membrane was then opened sharply, brown-brick red fluid was expressed consistent with subacute hematoma.  The membrane edges underneath the craniotomy were dissected free using Penfield 1 dissector and removed, and circumferential cauterization using bipolar cautery performed at the edge of the membrane.  The subdural space was then copiously irrigated with normal saline irrigation, with acute hematoma expressed out in the more anterior aspect.  Irrigation continued until the effluent was clear.    A large ventricular drain (Medtronic) was placed in the subdural space, brought out through a separate incision and secured to the skin using 2-0 nylon suture.  The dura was then reapproximated in augmented with dura repair (Medtronic) using 4 Nurolon suture in a running manner.  Green dural sealant (Adherence, Kendra) was sprayed over the dural closure.  The subdural space was filled with normal saline via the subdural drain, and attached to a standard monitoring and drainage bag system.  Gelfoam was placed over the dura.  The skull was replaced and secured in position using the Leibinger miniplate system (Kendra).  The operative  site was again copiously irrigated with antibiotic normal saline irrigation.  A medium Hemovac drain was placed, brought out through a separate incision and secured to the skin using 2-0 nylon suture.  The dermis was reapproximated using 2 and 3-0 Vicryl suture in interrupted inverted manner.  The skin was reapproximated using staples.  A silver containing dressing was placed.  Final counts were correct.      EBL: 30 cc  Specimen: Subdural membrane for permanent pathology  Findings: Acute, subacute subdural hematoma under pressure  Drains:  Right subdural to gravity, right subgaleal 2 Hemovac  Complication:  None apparent at end of procedure

## 2022-09-15 NOTE — THERAPY
Occupational Therapy   Initial Evaluation     Patient Name: Alec Howell  Age:  82 y.o., Sex:  male  Medical Record #: [id number]  Today's Date: 9/15/2022     Precautions  Precautions: Fall Risk, Swallow Precautions ( See Comments)  Comments: s/p R crani w/ drain    Assessment    Patient is 82 y.o. male admitted after fall from toilet hit his head, found to have R SDH s/p craniotomy with evacuation and EVD placement. Pt normally independent with functional mobility and ADLs living in a H with adult son. Pt required Philip for functional mobility, maxA for lower body ADLs/toileting due to bladder incontinence. Pt would benefit from continued skilled therapy while admitted as well as recommend post-acute placement.      Plan    Recommend Occupational Therapy 3 times per week until therapy goals are met for the following treatments:  Adaptive Equipment, Neuro Re-Education / Balance, Self Care/Activities of Daily Living, Therapeutic Activities, and Therapeutic Exercises.    DC Equipment Recommendations: (P) Unable to determine at this time  Discharge Recommendations: (P) Recommend post-acute placement for additional occupational therapy services prior to discharge home     Objective       09/15/22 1040   Prior Living Situation   Prior Services None   Housing / Facility 1 Story House   Steps Into Home 1   Steps In Home 0   Bathroom Set up Walk In Shower   Equipment Owned 4-Wheel Walker   Lives with - Patient's Self Care Capacity Adult Children   Prior Level of ADL Function   Self Feeding Independent   Grooming / Hygiene Independent   Bathing Independent   Dressing Independent   Toileting Independent   Prior Level of IADL Function   Medication Management Independent   Laundry Independent   Kitchen Mobility Independent   Finances Independent   Home Management Independent   Shopping Independent   Prior Level Of Mobility Independent With Device in Home   Driving / Transportation Relatives / Others Provide  Transportation   Occupation (Pre-Hospital Vocational) Retired Due To Age   History of Falls   History of Falls Yes   Date of Last Fall   (reason for admission)   Precautions   Precautions Fall Risk;Swallow Precautions ( See Comments)   Comments s/p crani w/ EVD   Pain 0 - 10 Group   Therapist Pain Assessment Post Activity Pain Same as Prior to Activity;Nurse Notified   Cognition    Cognition / Consciousness X   Speech/ Communication Hard of Hearing   Level of Consciousness Alert   Attention Impaired   Active ROM Upper Body   Active ROM Upper Body  WDL   Dominant Hand Right   Strength Upper Body   Upper Body Strength  WDL   Sensation Upper Body   Upper Extremity Sensation  WDL   Upper Body Muscle Tone   Upper Body Muscle Tone  WDL   Neurological Concerns   Neurological Concerns No   Coordination Upper Body   Coordination WDL   Balance Assessment   Sitting Balance (Static) Fair   Sitting Balance (Dynamic) Fair   Standing Balance (Static) Fair   Standing Balance (Dynamic) Fair -   Weight Shift Sitting Fair   Weight Shift Standing Fair   Comments w/ FWW   Bed Mobility    Supine to Sit Minimal Assist   Scooting Minimal Assist   Rolling Minimum Assist to Lt.   ADL Assessment   Grooming Minimal Assist;Standing   Upper Body Dressing Minimal Assist   Lower Body Dressing Maximal Assist   Toileting Maximal Assist   How much help from another person does the patient currently need...   Putting on and taking off regular lower body clothing? 2   Bathing (including washing, rinsing, and drying)? 2   Toileting, which includes using a toilet, bedpan, or urinal? 3   Putting on and taking off regular upper body clothing? 3   Taking care of personal grooming such as brushing teeth? 4   Eating meals? 4   6 Clicks Daily Activity Score 18   Functional Mobility   Sit to Stand Minimal Assist   Bed, Chair, Wheelchair Transfer Minimal Assist   Toilet Transfers Refused   Transfer Method Stand Step   Mobility bed mobility, multiple stands at  EOB, transfer to chair   Comments w/ FWW   Visual Perception   Visual Perception  WDL   Activity Tolerance   Sitting in Chair left seated in chair   Sitting Edge of Bed 5 min   Standing 10 min   Short Term Goals   Short Term Goal # 1 Pt will complete ADL transfers with supervision   Short Term Goal # 2 Pt will complete full body dressing with supervision   Short Term Goal # 3 Pt will complete standing G/H with supervision   Education Group   Education Provided Role of Occupational Therapist   Role of Occupational Therapist Patient Response Patient;Acceptance;Explanation   Problem List   Problem List Decreased Active Daily Living Skills;Decreased Homemaking Skills;Decreased Functional Mobility;Decreased Activity Tolerance;Impaired Postural Control / Balance;Impaired Cognitive Function   Anticipated Discharge Equipment and Recommendations   DC Equipment Recommendations Unable to determine at this time   Discharge Recommendations Recommend post-acute placement for additional occupational therapy services prior to discharge home   Interdisciplinary Plan of Care Collaboration   IDT Collaboration with  Nursing;Physical Therapist   Patient Position at End of Therapy Seated;Chair Alarm On;Call Light within Reach;Tray Table within Reach;Phone within Reach   Collaboration Comments RN updated

## 2022-09-15 NOTE — THERAPY
Physical Therapy   Initial Evaluation     Patient Name: Alec Howell  Age:  82 y.o., Sex:  male  Medical Record #: [id number]  Today's Date: 9/15/2022     Precautions  Precautions: Fall Risk;Swallow Precautions ( See Comments)  Comments: s/p R crani w/ drain    Assessment  Patient is 82 y.o. male presenting to physical therapy s/p R frontal parietal temporal craniotomy for evacuation acute, subacute subdural hematoma; resection subdural membrane, and subdural drain and EVD placement.  Pt demonstrated mild impairments in functional mobility, balance, gait and activity tolerance related to post op status and baseline R hip pain and neuropathy. Pt required Min A to complete mobility as detailed below, most limited by impaired balance and is a high fall risk at this time. Pt will benefit from acute PT interventions to address impairments noted as pt is below reported functional baseline.     Plan    Recommend Physical Therapy 4 times per week until therapy goals are met for the following treatments:  Bed Mobility, Equipment, Gait Training, Neuro Re-Education / Balance, Self Care/Home Evaluation, Stair Training, Therapeutic Activities, and Therapeutic Exercises       Discharge Recommendations: Recommend post-acute placement for additional physical therapy services prior to discharge home          09/15/22 1010   Precautions   Precautions Fall Risk;Swallow Precautions ( See Comments)   Comments s/p R crani w/ drain and EVD   Vitals   O2 (LPM) 15   O2 Delivery Device Non-Rebreather Mask   Pain 0 - 10 Group   Therapist Pain Assessment Nurse Notified  (slight HA but reports improved with mobility)   Prior Living Situation   Prior Services None   Housing / Facility 1 Story House   Steps Into Home 1   Steps In Home 0   Equipment Owned Front-Wheel Walker   Lives with - Patient's Self Care Capacity Adult Children  (Son- works during the day)   Comments From CHRISTINE Leon   Prior Level of Functional Mobility   Bed Mobility  Independent   Transfer Status Independent   Distance Ambulation (Feet)   (Community)   Assistive Devices Used Front-Wheel Walker   Stairs Independent   Cognition    Level of Consciousness Alert   Comments receptive and motivated to work with PT   Active ROM Lower Body    Active ROM Lower Body  WDL   Strength Lower Body   Lower Body Strength  X   Comments generalized weakness, grossly assessed at 4-/5   Balance Assessment   Sitting Balance (Static) Fair   Sitting Balance (Dynamic) Fair   Standing Balance (Static) Fair   Standing Balance (Dynamic) Fair -   Weight Shift Sitting Fair   Weight Shift Standing Fair   Comments w/ FWW   Gait Analysis   Gait Level Of Assist Minimal Assist   Assistive Device Front Wheel Walker   Distance (Feet) 90   # of Times Distance was Traveled 1   Deviation Antalgic  (variable step length and ALMA)   # of Stairs Climbed 0   Weight Bearing Status no restrictions   Comments baseline R hip pain  and peripheral neuropathy contributing to gait deviations   Bed Mobility    Supine to Sit Minimal Assist   Sit to Supine   (seated in chair at end of session)   Scooting Minimal Assist   Functional Mobility   Sit to Stand Minimal Assist   Bed, Chair, Wheelchair Transfer Minimal Assist   Transfer Method Stand Step   ICU Target Mobility Level   ICU Mobility - Targeted Level Level 4   How much difficulty does the patient currently have...   Turning over in bed (including adjusting bedclothes, sheets and blankets)? 3   Sitting down on and standing up from a chair with arms (e.g., wheelchair, bedside commode, etc.) 3   Moving from lying on back to sitting on the side of the bed? 3   How much help from another person does the patient currently need...   Moving to and from a bed to a chair (including a wheelchair)? 3   Need to walk in a hospital room? 3   Climbing 3-5 steps with a railing? 2   6 clicks Mobility Score 17   Activity Tolerance   Sitting in Chair up to chair at end of session   Sitting Edge of  Bed 5-7 min, one LOB to L   Standing 10 min   Short Term Goals    Short Term Goal # 1 pt will perform supine <> sit with HOB flat, no railing and SPV in 6 visits   Short Term Goal # 2 pt will perform sit <> stand and functional transfers with LRAD and SPV in 6 visits   Short Term Goal # 3 pt will ambulate > 150 ft with LRAD and SPV to access community in 6 visits   Short Term Goal # 4 pt will negotiate 1 step with LRAD and SPV to access home environment in 6 visits   Education Group   Education Provided Role of Physical Therapist   Role of Physical Therapist Patient Response Patient;Acceptance;Explanation;Verbal Demonstration   Problem List    Problems Pain;Impaired Bed Mobility;Impaired Transfers;Impaired Ambulation;Functional Strength Deficit;Impaired Balance;Decreased Activity Tolerance;Safety Awareness Deficits / Cognition   Anticipated Discharge Equipment and Recommendations   Discharge Recommendations Recommend post-acute placement for additional physical therapy services prior to discharge home

## 2022-09-15 NOTE — THERAPY
PT Contact Note:    PT consult received. Per RN, defer PT evalution today as pt was reintubated due to persistent seizures. Will initiate PT evaluation as medically appropriate. Thanks    Adelia Pruett, PT, DPT    [place]te [id number]

## 2022-09-15 NOTE — OR NURSING
1750: Pt arrived from OR post SDH evacuation under anesthesia. Pt is arousable to RN voice, but lethargic. R scalp sight is CDI with two drains; one is a hemovac to bulb suction, and one is a sudbural drain that is taped to the bed at ear level per Dr. Gleason's verbal order. Cardiac rhythm appears to be ST.    1753: Pt is lethargic, but is oriented to name, year, and situation. Unable to tell me what city he is in. Pt denies nausea. Pt reports 5/10 pain in head; medicated per MAR.    1815: Updated pt's son, Zackary.     1831: Report to GRETCHEN Hough.     1843: Pt to R110 via bed with RN; pt on a monitor.

## 2022-09-15 NOTE — PROGRESS NOTES
1840 - Patient arrived from PACU, connected to monitor, bedside neuro check performed by PACU and RICU RNs. VItals stable at this time. Care transitioned to evening team after bedside report given.

## 2022-09-15 NOTE — PROGRESS NOTES
"Critical Care Progress Note    Date of admission  9/12/2022    Chief Complaint  82 y.o. male admitted 9/12/2022 post GLF complaining of right shoulder and hip pain    Hospital Course  82 yom with a PMHx of cardiac stent placement (approximately 10 years ago)  primary HTN, BPH, diabetes type II, who presented to the ED at [facility] as a transfer from outside facility with a chief complaint of headache, right shoulder and hip pain after falling off of his toilet 2 days ago.  He denies any LOC, chest pain or shortness of breath. He does state that he has had chronic right-sided weakness, as well as decreased sensation to his right side for the past year or so that has not improved or worsened.   Shoulder and hip films at outlying facility were found to be negative for any fractures.  CT of head showed a 2.1 cm subdural hematoma in the right frontal area with a 1 mm right to left midline shift, therefore he  was transferred to [facility] for neurosurgical intervention and critical care management.      Interval Problem Update  Reviewed last 24 hour events:    -POD #1- right craniotomy with evacuation of subdural hematoma and resection of subdural membrane.  -CT head shows pneumocephalus- placed on 100% NRB   -EVD- 110cc  -Hemovac- 130cc  -Required 1 dose of labetalol  last night and this a.m. to keep SBP within parameter goal of less than 160  -I-/O - 24 hr +2000 since admit + 1000  -DVT PPX- SCDs  -afebrile  -NS @ 100- while NPO, and until oral intake increases post op  Started thiamine  and multivitamin for Malnutrition  Mag replacement  -Encourage BM protocol- pt refused treatment las pm  -Q1 neuro assessment  No asa or anticoags      Review of Systems  Review of Systems   Constitutional:  Negative for chills, fever and malaise/fatigue.   HENT:          \"Sore throat\"   Eyes:  Negative for blurred vision.   Respiratory:  Negative for cough.    Cardiovascular:  Negative for chest pain.   Gastrointestinal:  " "Negative for abdominal pain, heartburn, nausea and vomiting.   Genitourinary:  Positive for frequency. Negative for dysuria and flank pain.        \"Im just weak\"   Musculoskeletal:  Positive for myalgias.        Right arm and hip pain    Neurological:  Negative for dizziness, seizures, weakness and headaches.        \"Im still weak\"      Vital Signs for last 24 hours   Temp:  [36.1 °C (96.9 °F)-36.4 °C (97.6 °F)] 36.3 °C (97.3 °F)  Pulse:  [] 82  Resp:  [13-37] 27  BP: (127-173)/(58-80) 127/58  SpO2:  [88 %-100 %] 100 %    Hemodynamic parameters for last 24 hours       Respiratory Information for the last 24 hours       Physical Exam   Physical Exam  Vitals and nursing note reviewed.   Constitutional:       General: He is awake.      Appearance: Normal appearance.   HENT:      Head: Normocephalic.      Comments: Kerlix wrap to head- dry  intact without drainage      Mouth/Throat:      Mouth: Mucous membranes are moist.   Eyes:      Pupils: Pupils are equal, round, and reactive to light.   Neck:      Trachea: Trachea and phonation normal.   Cardiovascular:      Rate and Rhythm: Regular rhythm. Tachycardia present.      Pulses:           Radial pulses are 2+ on the right side and 2+ on the left side.   Pulmonary:      Effort: Pulmonary effort is normal.      Breath sounds: Normal breath sounds and air entry.      Comments: Without any SOB, respiratory distress  Abdominal:      General: Bowel sounds are normal.      Palpations: Abdomen is soft.   Genitourinary:     Comments: Voiding clear yellow urine  Skin:     General: Skin is warm and dry.      Capillary Refill: Capillary refill takes less than 2 seconds.   Neurological:      Mental Status: He is alert and oriented to person, place, and time. Mental status is at baseline.      GCS: GCS eye subscore is 4. GCS verbal subscore is 5. GCS motor subscore is 6.      Comments: Kerlix to head- intact without obvious drainage  AAO X 4   NEDA 3 mm  MONTEMAYOR spontaneously- " Right extrem slightly weaker than Left extrem       Psychiatric:         Attention and Perception: Attention normal.         Mood and Affect: Mood normal.         Speech: Speech normal.         Behavior: Behavior normal.       Medications  Current Facility-Administered Medications   Medication Dose Route Frequency Provider Last Rate Last Admin    thiamine (Vitamin B-1) tablet 100 mg  100 mg Oral DAILY Cristóbal Taylor M.D.   100 mg at 09/15/22 0807    multivitamin (THERAGRAN) tablet 1 Tablet  1 Tablet Oral DAILY Cristóbal Taylor M.D.   1 Tablet at 09/15/22 0806    NS infusion   Intravenous Continuous Livier Garcia A.P.R.N. 100 mL/hr at 09/14/22 1912 New Bag at 09/14/22 1912    morphine 4 MG/ML injection 4 mg  4 mg Intravenous Q4HRS PRN Marce Pacheco A.P.R.N.   4 mg at 09/15/22 0814    oxyCODONE immediate-release (ROXICODONE) tablet 5 mg  5 mg Oral Q4HRS PRN Marce Pacheco A.P.R.N.   5 mg at 09/15/22 0606    senna-docusate (PERICOLACE or SENOKOT S) 8.6-50 MG per tablet 2 Tablet  2 Tablet Oral BID Dc Leiva M.D.   2 Tablet at 09/14/22 1946    And    polyethylene glycol/lytes (MIRALAX) PACKET 1 Packet  1 Packet Oral QDAY PRN Dc Leiva M.D.        And    magnesium hydroxide (MILK OF MAGNESIA) suspension 30 mL  30 mL Oral QDAY PRN Dc Leiva M.D.        And    bisacodyl (DULCOLAX) suppository 10 mg  10 mg Rectal QDAY PRN Dc Leiva M.D.        Respiratory Therapy Consult   Nebulization Continuous RT Dc Leiva M.D.        ondansetron (ZOFRAN) syringe/vial injection 4 mg  4 mg Intravenous Q4HRS PRN Dc Leiva M.D.   4 mg at 09/15/22 0426    ondansetron (ZOFRAN ODT) dispertab 4 mg  4 mg Oral Q4HRS PRN Dc Leiva M.D.        amLODIPine (NORVASC) tablet 10 mg  10 mg Oral DAILY Dc Leiva M.D.   10 mg at 09/15/22 0607    atorvastatin (LIPITOR) tablet 40 mg  40 mg Oral Nightly Dc Leiva M.D.   40 mg at 09/14/22  2024    gabapentin (NEURONTIN) capsule 100 mg  100 mg Oral TID Dc Leiva M.D.   100 mg at 09/15/22 1202    zonisamide (ZONEGRAN) capsule 100 mg  100 mg Oral QHS Dc Leiva M.D.   100 mg at 09/14/22 2025    hydrALAZINE (APRESOLINE) injection 20 mg  20 mg Intravenous Q4HRS PRN Dc Leiva M.D.   20 mg at 09/13/22 1810    labetalol (NORMODYNE/TRANDATE) injection 10-20 mg  10-20 mg Intravenous Q4HRS PRN Dc Leiva M.D.        insulin regular (HumuLIN R,NovoLIN R) injection  2-9 Units Subcutaneous 4X/DAY ACHS Dc Leiva M.D.   2 Units at 09/15/22 1057    And    dextrose 10 % BOLUS 25 g  25 g Intravenous Q15 MIN PRN Dc Leiva M.D. MD Alert...ICU Electrolyte Replacement per Pharmacy   Other PHARMACY TO DOSE Dc Leiva M.D.        acetaminophen (Tylenol) tablet 650 mg  650 mg Oral Q4HRS PRN Giselle L. Latona   650 mg at 09/15/22 0814       Fluids    Intake/Output Summary (Last 24 hours) at 9/15/2022 1433  Last data filed at 9/15/2022 1200  Gross per 24 hour   Intake 3460 ml   Output 1185 ml   Net 2275 ml       Laboratory          Recent Labs     09/13/22  0333 09/14/22  0606 09/15/22  0359   SODIUM 136 139 138   POTASSIUM 4.1 4.5 4.3   CHLORIDE 103 102 105   CO2 25 25 24   BUN 24* 29* 21   CREATININE 1.06 1.11 1.09   MAGNESIUM 1.9 2.3 1.9   PHOSPHORUS 3.6 3.5 3.9   CALCIUM 9.0 9.2 8.8     Recent Labs     09/13/22  0333 09/14/22  0606 09/15/22  0359   GLUCOSE 178* 185* 209*     Recent Labs     09/13/22  0333 09/14/22  0606 09/15/22  0359   WBC 9.7 10.4 13.8*   NEUTSPOLYS 50.70 71.30 81.10*   LYMPHOCYTES 31.20 25.20 10.70*   MONOCYTES 10.50 0.90 5.90   EOSINOPHILS 6.00 0.90 0.60   BASOPHILS 1.10 1.70 0.80     Recent Labs     09/13/22  0333 09/14/22  0606 09/15/22  0359   RBC 3.87* 4.03* 3.66*   HEMOGLOBIN 11.8* 12.5* 11.3*   HEMATOCRIT 36.5* 38.0* 35.1*   PLATELETCT 340 378 336       Imaging  X-Ray:  I have personally reviewed the images  and compared with prior images.  CT:    Reviewed    Assessment/Plan  * Subdural hematoma (HCC)- (present on admission)  Assessment & Plan  Right-sided subdural hematoma with mass-effect  -POD #1- right craniotomy with evacuation of subdural hematoma and resection of subdural membrane. EVD placement  -CT head shows pneumocephalus- placed on 100% NRB   -EVD- okay to clamp when up - open while in bed- Record output Q2 hrs  -Hemovac-recording drainage Q8 hrs   --Required 1 dose of labetalol  last night and this a.m. to keep SBP within parameter goal of less than 160.   -Receiving PO antihypertensives.  -SCDs only no chem DVT ppx  -No asa  Maintain eunatremic, euglycemic, normothermic-we will start normal saline at 100 cc an hour while n.p.o.and until taking good PO  -follow cmp    Severe malnutrition (HCC)  Assessment & Plan    -Start daily thiamine  -Nutritional consult has been done- follow nutritionalrecommendations  -Start Multivitamin    Electrolyte abnormality  Assessment & Plan  -Continue to active replace Magnesium   -Will continue to follow daily labs- cmp and replace as needed  -multivitamin and thiamine     Primary hypertension  Assessment & Plan  Goal SBP less than 160  Continue amlodipine, 10 mg daily  IVP Antihypertensives as needed to keep within goal of SBP less than 160    Type 2 diabetes mellitus (HCC)- (present on admission)  Assessment & Plan  -Glycohemoglobin- 8.6  -Sliding scale insulin- adjust as needed  -Monitor glucose with FSBS and CMP/BMP          VTE:  Contraindicated  Ulcer: Not Indicated  Lines: None    I have performed a physical exam and reviewed and updated ROS and Plan today (9/15/2022). In review of yesterday's note (9/14/2022), there are no changes except as documented above.     Discussed patient condition and risk of morbidity and/or mortality with RN, RT, Pharmacy, Charge nurse / hot rounds, Patient, and neurosurgery  The patient remains critically ill.  Critical care time = 45  minutes in directly providing and coordinating critical care and extensive data review.  No time overlap and excludes procedures.

## 2022-09-15 NOTE — CARE PLAN
The patient is Watcher - Medium risk of patient condition declining or worsening    Shift Goals  Clinical Goals: Stable neuro exam, SBP  <160  Patient Goals: Sleep, pain control  Family Goals: EMMANUEL    Progress made toward(s) clinical / shift goals:      Problem: Knowledge Deficit - Standard  Goal: Patient and family/care givers will demonstrate understanding of plan of care, disease process/condition, diagnostic tests and medications  Outcome: Progressing     Problem: Skin Integrity  Goal: Skin integrity is maintained or improved  Outcome: Progressing     Problem: Fall Risk  Goal: Patient will remain free from falls  Outcome: Progressing     Problem: Pain - Standard  Goal: Alleviation of pain or a reduction in pain to the patient’s comfort goal  Outcome: Progressing       Patient is not progressing towards the following goals: N/A

## 2022-09-15 NOTE — ANESTHESIA TIME REPORT
Anesthesia Start and Stop Event Times     Date Time Event    9/14/2022 1521 Ready for Procedure     1521 Anesthesia Start     1745 Anesthesia Stop        Responsible Staff  09/14/22    Name Role Begin End    Jabari Frank M.D. Anesth 1521 1749        Overtime Reason:  no overtime (within assigned shift)    Comments:

## 2022-09-16 ENCOUNTER — APPOINTMENT (OUTPATIENT)
Dept: RADIOLOGY | Facility: MEDICAL CENTER | Age: 82
DRG: 025 | End: 2022-09-16
Attending: NEUROLOGICAL SURGERY
Payer: MEDICARE

## 2022-09-16 PROBLEM — G93.89 PNEUMOCEPHALUS: Status: ACTIVE | Noted: 2022-09-16

## 2022-09-16 LAB
ANION GAP SERPL CALC-SCNC: 7 MMOL/L (ref 7–16)
ANION GAP SERPL CALC-SCNC: 9 MMOL/L (ref 7–16)
BASOPHILS # BLD AUTO: 0.6 % (ref 0–1.8)
BASOPHILS # BLD: 0.08 K/UL (ref 0–0.12)
BUN SERPL-MCNC: 16 MG/DL (ref 8–22)
BUN SERPL-MCNC: 19 MG/DL (ref 8–22)
CA-I SERPL-SCNC: 1.2 MMOL/L (ref 1.1–1.3)
CALCIUM SERPL-MCNC: 6.6 MG/DL (ref 8.5–10.5)
CALCIUM SERPL-MCNC: 9.1 MG/DL (ref 8.5–10.5)
CHLORIDE SERPL-SCNC: 103 MMOL/L (ref 96–112)
CHLORIDE SERPL-SCNC: 113 MMOL/L (ref 96–112)
CO2 SERPL-SCNC: 19 MMOL/L (ref 20–33)
CO2 SERPL-SCNC: 22 MMOL/L (ref 20–33)
CREAT SERPL-MCNC: 0.66 MG/DL (ref 0.5–1.4)
CREAT SERPL-MCNC: 0.99 MG/DL (ref 0.5–1.4)
EOSINOPHIL # BLD AUTO: 0.16 K/UL (ref 0–0.51)
EOSINOPHIL NFR BLD: 1.2 % (ref 0–6.9)
ERYTHROCYTE [DISTWIDTH] IN BLOOD BY AUTOMATED COUNT: 45 FL (ref 35.9–50)
GFR SERPLBLD CREATININE-BSD FMLA CKD-EPI: 77 ML/MIN/1.73 M 2
GFR SERPLBLD CREATININE-BSD FMLA CKD-EPI: 95 ML/MIN/1.73 M 2
GLUCOSE BLD STRIP.AUTO-MCNC: 171 MG/DL (ref 65–99)
GLUCOSE BLD STRIP.AUTO-MCNC: 202 MG/DL (ref 65–99)
GLUCOSE BLD STRIP.AUTO-MCNC: 204 MG/DL (ref 65–99)
GLUCOSE BLD STRIP.AUTO-MCNC: 226 MG/DL (ref 65–99)
GLUCOSE SERPL-MCNC: 147 MG/DL (ref 65–99)
GLUCOSE SERPL-MCNC: 234 MG/DL (ref 65–99)
HCT VFR BLD AUTO: 28.9 % (ref 42–52)
HGB BLD-MCNC: 9.2 G/DL (ref 14–18)
IMM GRANULOCYTES # BLD AUTO: 0.08 K/UL (ref 0–0.11)
IMM GRANULOCYTES NFR BLD AUTO: 0.6 % (ref 0–0.9)
LYMPHOCYTES # BLD AUTO: 2.22 K/UL (ref 1–4.8)
LYMPHOCYTES NFR BLD: 16 % (ref 22–41)
MAGNESIUM SERPL-MCNC: 1.7 MG/DL (ref 1.5–2.5)
MAGNESIUM SERPL-MCNC: 1.9 MG/DL (ref 1.5–2.5)
MCH RBC QN AUTO: 31 PG (ref 27–33)
MCHC RBC AUTO-ENTMCNC: 31.8 G/DL (ref 33.7–35.3)
MCV RBC AUTO: 97.3 FL (ref 81.4–97.8)
MONOCYTES # BLD AUTO: 1.23 K/UL (ref 0–0.85)
MONOCYTES NFR BLD AUTO: 8.9 % (ref 0–13.4)
NEUTROPHILS # BLD AUTO: 10.07 K/UL (ref 1.82–7.42)
NEUTROPHILS NFR BLD: 72.7 % (ref 44–72)
NRBC # BLD AUTO: 0 K/UL
NRBC BLD-RTO: 0 /100 WBC
PHOSPHATE SERPL-MCNC: 2.2 MG/DL (ref 2.5–4.5)
PHOSPHATE SERPL-MCNC: 2.6 MG/DL (ref 2.5–4.5)
PLATELET # BLD AUTO: 243 K/UL (ref 164–446)
PMV BLD AUTO: 10.8 FL (ref 9–12.9)
POTASSIUM SERPL-SCNC: 3.3 MMOL/L (ref 3.6–5.5)
POTASSIUM SERPL-SCNC: 4.5 MMOL/L (ref 3.6–5.5)
RBC # BLD AUTO: 2.97 M/UL (ref 4.7–6.1)
SODIUM SERPL-SCNC: 134 MMOL/L (ref 135–145)
SODIUM SERPL-SCNC: 139 MMOL/L (ref 135–145)
WBC # BLD AUTO: 13.8 K/UL (ref 4.8–10.8)

## 2022-09-16 PROCEDURE — 99291 CRITICAL CARE FIRST HOUR: CPT | Performed by: NURSE PRACTITIONER

## 2022-09-16 PROCEDURE — A9270 NON-COVERED ITEM OR SERVICE: HCPCS | Performed by: NURSE PRACTITIONER

## 2022-09-16 PROCEDURE — 80048 BASIC METABOLIC PNL TOTAL CA: CPT

## 2022-09-16 PROCEDURE — 700102 HCHG RX REV CODE 250 W/ 637 OVERRIDE(OP): Performed by: NURSE PRACTITIONER

## 2022-09-16 PROCEDURE — A9270 NON-COVERED ITEM OR SERVICE: HCPCS | Performed by: INTERNAL MEDICINE

## 2022-09-16 PROCEDURE — 700101 HCHG RX REV CODE 250: Performed by: NURSE PRACTITIONER

## 2022-09-16 PROCEDURE — 700111 HCHG RX REV CODE 636 W/ 250 OVERRIDE (IP): Performed by: NURSE PRACTITIONER

## 2022-09-16 PROCEDURE — 85025 COMPLETE CBC W/AUTO DIFF WBC: CPT

## 2022-09-16 PROCEDURE — 83735 ASSAY OF MAGNESIUM: CPT

## 2022-09-16 PROCEDURE — 82962 GLUCOSE BLOOD TEST: CPT | Mod: 91

## 2022-09-16 PROCEDURE — 700102 HCHG RX REV CODE 250 W/ 637 OVERRIDE(OP): Performed by: INTERNAL MEDICINE

## 2022-09-16 PROCEDURE — 82330 ASSAY OF CALCIUM: CPT

## 2022-09-16 PROCEDURE — 770022 HCHG ROOM/CARE - ICU (200)

## 2022-09-16 PROCEDURE — 700105 HCHG RX REV CODE 258: Performed by: NURSE PRACTITIONER

## 2022-09-16 PROCEDURE — 84100 ASSAY OF PHOSPHORUS: CPT

## 2022-09-16 PROCEDURE — 70450 CT HEAD/BRAIN W/O DYE: CPT

## 2022-09-16 RX ORDER — MORPHINE SULFATE 4 MG/ML
2-4 INJECTION INTRAVENOUS EVERY 4 HOURS PRN
Status: DISCONTINUED | OUTPATIENT
Start: 2022-09-16 | End: 2022-09-25

## 2022-09-16 RX ORDER — MAGNESIUM SULFATE HEPTAHYDRATE 40 MG/ML
2 INJECTION, SOLUTION INTRAVENOUS ONCE
Status: COMPLETED | OUTPATIENT
Start: 2022-09-16 | End: 2022-09-16

## 2022-09-16 RX ORDER — POTASSIUM CHLORIDE 29.8 MG/ML
40 INJECTION INTRAVENOUS ONCE
Status: DISCONTINUED | OUTPATIENT
Start: 2022-09-16 | End: 2022-09-16

## 2022-09-16 RX ORDER — OXYCODONE HYDROCHLORIDE 5 MG/1
5-10 TABLET ORAL EVERY 4 HOURS PRN
Status: DISCONTINUED | OUTPATIENT
Start: 2022-09-16 | End: 2022-09-25

## 2022-09-16 RX ADMIN — POTASSIUM PHOSPHATE, MONOBASIC AND POTASSIUM PHOSPHATE, DIBASIC 15 MMOL: 224; 236 INJECTION, SOLUTION, CONCENTRATE INTRAVENOUS at 06:44

## 2022-09-16 RX ADMIN — ACETAMINOPHEN 650 MG: 325 TABLET, FILM COATED ORAL at 20:32

## 2022-09-16 RX ADMIN — DOCUSATE SODIUM 50 MG AND SENNOSIDES 8.6 MG 2 TABLET: 8.6; 5 TABLET, FILM COATED ORAL at 17:46

## 2022-09-16 RX ADMIN — INSULIN GLARGINE-YFGN 7 UNITS: 100 INJECTION, SOLUTION SUBCUTANEOUS at 17:48

## 2022-09-16 RX ADMIN — INSULIN HUMAN 3 UNITS: 100 INJECTION, SOLUTION PARENTERAL at 11:22

## 2022-09-16 RX ADMIN — CALCIUM CHLORIDE 1000 MG: 100 INJECTION, SOLUTION INTRAVENOUS at 06:02

## 2022-09-16 RX ADMIN — GABAPENTIN 100 MG: 100 CAPSULE ORAL at 17:46

## 2022-09-16 RX ADMIN — GABAPENTIN 100 MG: 100 CAPSULE ORAL at 11:26

## 2022-09-16 RX ADMIN — GABAPENTIN 100 MG: 100 CAPSULE ORAL at 06:01

## 2022-09-16 RX ADMIN — DOCUSATE SODIUM 50 MG AND SENNOSIDES 8.6 MG 2 TABLET: 8.6; 5 TABLET, FILM COATED ORAL at 06:01

## 2022-09-16 RX ADMIN — OXYCODONE 5 MG: 5 TABLET ORAL at 14:45

## 2022-09-16 RX ADMIN — THERA TABS 1 TABLET: TAB at 06:01

## 2022-09-16 RX ADMIN — INSULIN HUMAN 3 UNITS: 100 INJECTION, SOLUTION PARENTERAL at 17:46

## 2022-09-16 RX ADMIN — ZONISAMIDE 100 MG: 50 CAPSULE ORAL at 20:32

## 2022-09-16 RX ADMIN — ATORVASTATIN CALCIUM 40 MG: 40 TABLET, FILM COATED ORAL at 20:32

## 2022-09-16 RX ADMIN — INSULIN HUMAN 2 UNITS: 100 INJECTION, SOLUTION PARENTERAL at 20:41

## 2022-09-16 RX ADMIN — MAGNESIUM SULFATE HEPTAHYDRATE 2 G: 40 INJECTION, SOLUTION INTRAVENOUS at 09:07

## 2022-09-16 RX ADMIN — OXYCODONE 5 MG: 5 TABLET ORAL at 08:00

## 2022-09-16 RX ADMIN — Medication 100 MG: at 06:01

## 2022-09-16 RX ADMIN — INSULIN HUMAN 3 UNITS: 100 INJECTION, SOLUTION PARENTERAL at 09:16

## 2022-09-16 RX ADMIN — OXYCODONE 5 MG: 5 TABLET ORAL at 22:06

## 2022-09-16 RX ADMIN — AMLODIPINE BESYLATE 10 MG: 10 TABLET ORAL at 06:02

## 2022-09-16 ASSESSMENT — ENCOUNTER SYMPTOMS
SHORTNESS OF BREATH: 0
WEAKNESS: 0
NAUSEA: 0
HEARTBURN: 0
COUGH: 0
FEVER: 0
FLANK PAIN: 0
VOMITING: 0
MYALGIAS: 1
ABDOMINAL PAIN: 0
BLURRED VISION: 0
CHILLS: 0
HEADACHES: 0
FOCAL WEAKNESS: 0
DIZZINESS: 0
SEIZURES: 0

## 2022-09-16 ASSESSMENT — PAIN DESCRIPTION - PAIN TYPE
TYPE: ACUTE PAIN

## 2022-09-16 ASSESSMENT — FIBROSIS 4 INDEX: FIB4 SCORE: 1.87

## 2022-09-16 NOTE — PROGRESS NOTES
Tech from Lab called with critical result of calcium 6.6 at 0417. Critical lab result read back to Tech.   EMY Pacheco notified of critical lab result at 0418.  Critical lab result read back by EMY Pacheco.

## 2022-09-16 NOTE — THERAPY
Physical Therapy Contact Note    PT treatment attempted. RN reported patient just returned to bed with assist from RN staff after sitting in chair most of the day and fatigued. Will re attempt as able and appropriate.    Angeline Balderas, PT, DPT  [phone]

## 2022-09-16 NOTE — DIETARY
Nutrition Services Brief Update:    Day 4 of admit.  Alec Howell is a 82 y.o. male with admitting DX of Subdural hematoma (HCC) [S06.5X9A]    Pt is on soft and bite-sized diet with thin liquids and diabetic diet addition. PO intake is mainly % of meals with occasional meal of 25-50%. Pt and family seen at bedside, reports appetite is okay and pt is liking Boost shakes. Observed ~50% of breakfast today and 100% Boost glucose shake.     Problem: Nutritional:  Goal: Achieve adequate nutritional intake  Description: Patient will consume ~50% of meals  Outcome: Progressing     RD following.

## 2022-09-16 NOTE — PROGRESS NOTES
Critical Care Progress Note    Date of admission  9/12/2022    Chief Complaint  82 y.o. male admitted 9/12/2022 post GLF complaining of right shoulder and hip pain    Hospital Course  82 yom with a PMHx of cardiac stent placement (approximately 10 years ago)  primary HTN, BPH, diabetes type II, who presented to the ED at [facility] as a transfer from outside facility with a chief complaint of headache, right shoulder and hip pain after falling off of his toilet 2 days ago.  He denies any LOC, chest pain or shortness of breath. He does state that he has had chronic right-sided weakness, as well as decreased sensation to his right side for the past year or so that has not improved or worsened.   Shoulder and hip films at outlying facility were found to be negative for any fractures.  CT of head showed a 2.1 cm subdural hematoma in the right frontal area with a 1 mm right to left midline shift, therefore he  was transferred to [facility] for neurosurgical intervention and critical care management.      Interval Problem Update  Reviewed last 24 hour events:    -POD #2  right craniotomy with evacuation of subdural hematoma and resection of subdural membrane.  -CT head shows pneumocephalus- will continue another 24 hr  100% NRB   -EVD- 46 cc- drain lowered by NSG to shoulder level.   -Required 1 dose of hydralazine last night and this a.m. to keep SBP within parameter goal of less than 160  -I-/O - 24 hr- Not accurate 2nd to condom cath falling off a few times during hospitalization with pads saturated with urine.  -DVT PPX- SCDs  -afebrile  -eating 50-75 % of meals - taking 100% of boost supplement  -NS @ 100- Dc'd today 2nd to good PO intake.   -Replacing- mag, phos,K  and calcium- Repeat BMP tonight   -Encourage BM protocol- pt refusing at times  No asa or anticoags      Review of Systems  Review of Systems   Constitutional:  Negative for chills, fever and malaise/fatigue.   Eyes:  Negative for blurred vision.  "  Respiratory:  Negative for cough and shortness of breath.    Cardiovascular:  Negative for chest pain.   Gastrointestinal:  Negative for abdominal pain, heartburn, nausea and vomiting.   Genitourinary:  Positive for frequency. Negative for flank pain.   Musculoskeletal:  Positive for myalgias.        Right arm and hip pain    Neurological:  Negative for dizziness, focal weakness, seizures, weakness and headaches.        \"My right side is still numb and weak\"      Vital Signs for last 24 hours   Temp:  [36.4 °C (97.6 °F)-36.7 °C (98.1 °F)] 36.7 °C (98.1 °F)  Pulse:  [] 93  Resp:  [12-26] 14  BP: (128-183)/(63-83) 148/70  SpO2:  [99 %-100 %] 100 %    Hemodynamic parameters for last 24 hours       Respiratory Information for the last 24 hours       Physical Exam   Physical Exam  Vitals and nursing note reviewed.   Constitutional:       General: He is awake.      Appearance: Normal appearance.   HENT:      Head: Normocephalic.      Comments: Kerlix wrap to head- dry  intact without drainage   Drain - draining  serosang fluid. Drain positioned at shoulder ht.     Mouth/Throat:      Mouth: Mucous membranes are moist.   Eyes:      General: Lids are normal.      Pupils: Pupils are equal, round, and reactive to light.   Neck:      Trachea: Trachea and phonation normal.   Cardiovascular:      Rate and Rhythm: Regular rhythm. Tachycardia present.      Pulses:           Radial pulses are 2+ on the right side and 2+ on the left side.        Dorsalis pedis pulses are 1+ on the right side and 1+ on the left side.   Pulmonary:      Effort: Pulmonary effort is normal.      Breath sounds: Normal breath sounds and air entry.      Comments: Without any SOB, respiratory distress  Abdominal:      General: Bowel sounds are normal.      Palpations: Abdomen is soft.   Genitourinary:     Comments: Voiding clear yellow urine  Skin:     General: Skin is warm and dry.      Capillary Refill: Capillary refill takes less than 2 seconds. "   Neurological:      Mental Status: He is alert and oriented to person, place, and time. Mental status is at baseline.      GCS: GCS eye subscore is 4. GCS verbal subscore is 5. GCS motor subscore is 6.      Comments: Kerlix to head- intact without obvious drainage  Sleeping however wakes easily to stimuli and is oriented x4  NEDA 3 mm  MONTEMAYOR spontaneously-right extremities are with decreased sensation and weak compared to left extremities       Psychiatric:         Attention and Perception: Attention normal.         Mood and Affect: Mood normal.         Speech: Speech normal.         Behavior: Behavior normal.       Medications  Current Facility-Administered Medications   Medication Dose Route Frequency Provider Last Rate Last Admin    thiamine (Vitamin B-1) tablet 100 mg  100 mg Oral DAILY Cristóbal Taylor M.D.   100 mg at 09/16/22 0601    multivitamin (THERAGRAN) tablet 1 Tablet  1 Tablet Oral DAILY Cristóbal Taylor M.D.   1 Tablet at 09/16/22 0601    morphine 4 MG/ML injection 4 mg  4 mg Intravenous Q4HRS PRN SHERRY Mtz.P.R.N.   4 mg at 09/15/22 2215    oxyCODONE immediate-release (ROXICODONE) tablet 5 mg  5 mg Oral Q4HRS PRN Marce Pacheco A.P.R.N.   5 mg at 09/16/22 0800    senna-docusate (PERICOLACE or SENOKOT S) 8.6-50 MG per tablet 2 Tablet  2 Tablet Oral BID Dc Leiva M.D.   2 Tablet at 09/16/22 0601    And    polyethylene glycol/lytes (MIRALAX) PACKET 1 Packet  1 Packet Oral QDAY PRN Dc Leiva M.D.   1 Packet at 09/15/22 1517    And    magnesium hydroxide (MILK OF MAGNESIA) suspension 30 mL  30 mL Oral QDAY PRN Dc Leiva M.D.        And    bisacodyl (DULCOLAX) suppository 10 mg  10 mg Rectal QDAY PRN Dc Leiva M.D.        Respiratory Therapy Consult   Nebulization Continuous RT Dc Leiva M.D.        ondansetron (ZOFRAN) syringe/vial injection 4 mg  4 mg Intravenous Q4HRS PRN Dc Leiva M.D.   4 mg at 09/15/22 5036     ondansetron (ZOFRAN ODT) dispertab 4 mg  4 mg Oral Q4HRS PRN Dc Leiva M.D.        amLODIPine (NORVASC) tablet 10 mg  10 mg Oral DAILY Dc Leiva M.D.   10 mg at 09/16/22 0602    atorvastatin (LIPITOR) tablet 40 mg  40 mg Oral Nightly Dc Leiva M.D.   40 mg at 09/15/22 2107    gabapentin (NEURONTIN) capsule 100 mg  100 mg Oral TID Dc Leiva M.D.   100 mg at 09/16/22 1126    zonisamide (ZONEGRAN) capsule 100 mg  100 mg Oral QHS Dc Leiva M.D.   100 mg at 09/15/22 2106    hydrALAZINE (APRESOLINE) injection 20 mg  20 mg Intravenous Q4HRS PRN Dc Leiva M.D.   20 mg at 09/13/22 1810    labetalol (NORMODYNE/TRANDATE) injection 10-20 mg  10-20 mg Intravenous Q4HRS PRN Dc Leiva M.D.   10 mg at 09/15/22 1915    insulin regular (HumuLIN R,NovoLIN R) injection  2-9 Units Subcutaneous 4X/DAY ACHAPRIL Leiva M.D.   3 Units at 09/16/22 1122    And    dextrose 10 % BOLUS 25 g  25 g Intravenous Q15 MIN PRN Dc Leiva M.D. MD Alert...ICU Electrolyte Replacement per Pharmacy   Other PHARMACY TO DOSE Dc Leiva M.D.        acetaminophen (Tylenol) tablet 650 mg  650 mg Oral Q4HRS PRN Giselle L. Latona   650 mg at 09/15/22 2107       Fluids    Intake/Output Summary (Last 24 hours) at 9/16/2022 1240  Last data filed at 9/16/2022 1200  Gross per 24 hour   Intake 4063.23 ml   Output 2539 ml   Net 1524.23 ml       Laboratory          Recent Labs     09/14/22  0606 09/15/22  0359 09/16/22  0320   SODIUM 139 138 139   POTASSIUM 4.5 4.3 3.3*   CHLORIDE 102 105 113*   CO2 25 24 19*   BUN 29* 21 16   CREATININE 1.11 1.09 0.66   MAGNESIUM 2.3 1.9 1.7   PHOSPHORUS 3.5 3.9 2.2*   CALCIUM 9.2 8.8 6.6*     Recent Labs     09/14/22  0606 09/15/22  0359 09/16/22  0320   GLUCOSE 185* 209* 147*     Recent Labs     09/14/22  0606 09/15/22  0359 09/16/22  0320   WBC 10.4 13.8* 13.8*   NEUTSPOLYS 71.30 81.10* 72.70*    LYMPHOCYTES 25.20 10.70* 16.00*   MONOCYTES 0.90 5.90 8.90   EOSINOPHILS 0.90 0.60 1.20   BASOPHILS 1.70 0.80 0.60     Recent Labs     09/14/22  0606 09/15/22  0359 09/16/22  0320   RBC 4.03* 3.66* 2.97*   HEMOGLOBIN 12.5* 11.3* 9.2*   HEMATOCRIT 38.0* 35.1* 28.9*   PLATELETCT 378 336 243       Imaging  No films today    Assessment/Plan  * Subdural hematoma (HCC)- (present on admission)  Assessment & Plan  Right-sided subdural hematoma with mass-effect  -POD #2- right craniotomy with evacuation of subdural hematoma and resection of subdural membrane. EVD placement  -CT head shows pneumocephalus- placed on 100% NRB - for nitrogen washout  -EVD- output  -Required 1 dose of Dralzine last night  to keep SBP within parameter goal of less than 160.   -Receiving PO antihypertensives.  -SCDs only no chem DVT ppx  -No asa  Maintain eunatremic, euglycemic, normothermic  -follow cmp    Pneumocephalus  Assessment & Plan  Pneumocephalus post right craniotomy with evacuation of subdural hematoma and resection of subdural membrane  CT head postop showed pneumocephalus-we will continue oxygen augmentation to decrease pulmonary nitrogen levels which will create a nitrogen gradient causing nitrogen pneumocephalus to diffuse into the lungs via blood.    Severe malnutrition (HCC)  Assessment & Plan    -Start daily thiamine  -Nutritional consult has been done- follow nutritionalrecommendations  -Start Multivitamin  -dietary following    Electrolyte abnormality  Assessment & Plan  -Actively replacing mag, phos, K and calcium- will get BMP tonight and in am   -Will continue to follow daily labs- cmp and replace as needed  -multivitamin and thiamine   -dietary consult ordered- following with recs  - pt taking adequate PO and therefore IV fluid dcd   -Poor I and O measurements due to condom cath with episodes of being removed with unkn amount of urine output noted on pads    Primary hypertension  Assessment & Plan  Goal SBP less than  160  Continue amlodipine, 10 mg daily  IVP Antihypertensives as needed to keep within goal of SBP less than 160    Type 2 diabetes mellitus (HCC)- (present on admission)  Assessment & Plan  -Glycohemoglobin- 8.6  -Sliding scale insulin- adjust as needed-increase scale vs  long acting  -Monitor glucose with FSBS and CMP/BMP          VTE:  Contraindicated  Ulcer: Not Indicated  Lines: None    I have performed a physical exam and reviewed and updated ROS and Plan today (9/16/2022). In review of yesterday's note (9/15/2022), there are no changes except as documented above.     Discussed patient condition and risk of morbidity and/or mortality with RN, RT, Pharmacy, Charge nurse / hot rounds, Patient, and neurosurgery  The patient remains critically ill.  Critical care time = 40  minutes in directly providing and coordinating critical care and extensive data review.  No time overlap and excludes procedures.

## 2022-09-16 NOTE — PROGRESS NOTES
"0530 patient agitated attempting to get out of bed claiming RN kidnapped him and was holding him hostage. This RN performed a neuro exam on patient, patient is suddenly disoriented to place, claims the hospital looks like a warehouse. Patient attempting to use personal phone to call 911 but unable to unlock phone. RN reoriented patient to place and patient could remember why he was in the hospital and what hospital he was \"supposed to be in\" but told this RN he was no longer there and was some how taken from there. Patient asking to speak to his son Zackary. This RN contacted the patients son Zackary and spoke to him, Zackary called the patients personal phone and spoke with him personally and explained he was still in the hospital. Patient still demanding to speak to police officers this RN called security to patients bedside to see patient and possibly deescalate the situation. Patient responded positively to security coming to bedside and reoriented to being in the hospital, agreeable that he is still in [facility] and [facility] again. Security left patients bedside and waited at main nurses station for 30 minutes, patient remained calm and went back to sleep, three bed rails up, bed in lowest position and locked and bed alarm on.  "

## 2022-09-16 NOTE — PROGRESS NOTES
Patient attempting to get out of bed without calling for assistance throughout shift. At approximately 1645, patient found standing at EOB attempting to get up to chair. Bed alarm was turned on but not alarming. Patient safely transferred to chair with RN and writer's assistance. Chair alarm under patient. Bed exchanged for ICU bed with working alarm.

## 2022-09-16 NOTE — PROGRESS NOTES
Patient progressively more irritable throughout the day. At 1600, patient having visual hallucinations that his dog was in bed with him. Patient is noncompliant to instruction and verbally aggressive to RN and student RN. Informed APRN of hallucinations and irritabilty. Contacted Justine Larios from neurosurgery regarding changes.

## 2022-09-16 NOTE — CARE PLAN
The patient is Watcher - Medium risk of patient condition declining or worsening    Shift Goals  Clinical Goals: SBP <160, stable neuro assessment  Patient Goals: pain control  Family Goals: EMMANUEL    Progress made toward(s) clinical / shift goals:      Problem: Knowledge Deficit - Standard  Goal: Patient and family/care givers will demonstrate understanding of plan of care, disease process/condition, diagnostic tests and medications  Outcome: Progressing     Problem: Skin Integrity  Goal: Skin integrity is maintained or improved  Outcome: Progressing     Problem: Fall Risk  Goal: Patient will remain free from falls  Outcome: Progressing     Problem: Pain - Standard  Goal: Alleviation of pain or a reduction in pain to the patient’s comfort goal  Outcome: Progressing       Patient is not progressing towards the following goals: N/A

## 2022-09-16 NOTE — PROGRESS NOTES
Neurosurgery Progress Note    Subjective:  More agitated during night, confused     Exam:  AAO, fluent speech, confused on place, thinks he is at the bank   3 PERRl, EOMI   -drift, tongue midline   MONTEMAYOR, FAC   Sensation intact throughout   Incision CDI, staples intact   HVAC 30ml/8hr serosang   EVD 17ml/8hr serosang   Eating, drinking   -BM     BP  Min: 128/65  Max: 183/82  Pulse  Av  Min: 78  Max: 104  Resp  Av.3  Min: 12  Max: 26  Temp  Av.6 °C (97.8 °F)  Min: 36.4 °C (97.6 °F)  Max: 36.7 °C (98.1 °F)  SpO2  Av.9 %  Min: 99 %  Max: 100 %    No data recorded    Recent Labs     22  0606 09/15/22  0359 22  0320   WBC 10.4 13.8* 13.8*   RBC 4.03* 3.66* 2.97*   HEMOGLOBIN 12.5* 11.3* 9.2*   HEMATOCRIT 38.0* 35.1* 28.9*   MCV 94.3 95.9 97.3   MCH 31.0 30.9 31.0   MCHC 32.9* 32.2* 31.8*   RDW 43.3 44.1 45.0   PLATELETCT 378 336 243   MPV 10.4 11.1 10.8       Recent Labs     22  0606 09/15/22  0359 22  0320   SODIUM 139 138 139   POTASSIUM 4.5 4.3 3.3*   CHLORIDE 102 105 113*   CO2 25 24 19*   GLUCOSE 185* 209* 147*   BUN 29* 21 16   CREATININE 1.11 1.09 0.66   CALCIUM 9.2 8.8 6.6*                     Intake/Output                         09/15/22 0700 - 2259 22 - 22 0659     [id number] Total [id number] Total                 Intake    P.O.  540  -- 540  720  -- 720    P.O. 540 -- 540 720 -- 720    I.V.  1258.3  1191.7 2450  650  -- 650    Magnesium Sulfate Volume 50 -- 50 50 -- 50    Volume (mL) (NS infusion) 1208.3 1191.7 2400 600 -- 600    IV Piggyback  --  -- --  643.2  -- 643.2    Volume (mL) (potassium phosphate 15 mmol in dextrose 5% 250 mL ivpb) -- -- -- 249.9 -- 249.9    Volume (mL) (calcium CHLORIDE 1,000 mg in dextrose 5% 100 mL IVPB) -- -- -- 393.3 -- 393.3    Total Intake 1798.3 1191.7 2990 2013.2 -- 2013.2       Output    Urine  505  1300 1805  725  -- 725    Output (mL) (External Urinary Catheter (Condom)) 505 1300 1805  725 -- 725    Drains  62  44 106  48  -- 48    Output (mL) ([REMOVED] Closed/Suction Drain 1 Right Scalp Hemovac 10 Fr.) 21 20 41 10 -- 10    Output (mL) (ICP/Ventriculostomy Left Occipital region) 41 24 65 38 -- 38    Stool  --  -- --  --  -- --    Number of Times Stooled 0 x -- 0 x -- -- --    Total Output 567 1344 1911 773 -- 773       Net I/O     1231.3 -152.3 1079 1240.2 -- 1240.2              Intake/Output Summary (Last 24 hours) at 9/16/2022 1243  Last data filed at 9/16/2022 1200  Gross per 24 hour   Intake 4063.23 ml   Output 2539 ml   Net 1524.23 ml               thiamine  100 mg DAILY    multivitamin  1 Tablet DAILY    morphine injection  4 mg Q4HRS PRN    oxyCODONE immediate-release  5 mg Q4HRS PRN    senna-docusate  2 Tablet BID    And    polyethylene glycol/lytes  1 Packet QDAY PRN    And    magnesium hydroxide  30 mL QDAY PRN    And    bisacodyl  10 mg QDAY PRN    Respiratory Therapy Consult   Continuous RT    ondansetron  4 mg Q4HRS PRN    ondansetron  4 mg Q4HRS PRN    amLODIPine  10 mg DAILY    atorvastatin  40 mg Nightly    gabapentin  100 mg TID    zonisamide  100 mg QHS    hydrALAZINE  20 mg Q4HRS PRN    labetalol  10-20 mg Q4HRS PRN    insulin regular  2-9 Units 4X/DAY ACHS    And    dextrose bolus  25 g Q15 MIN PRN    MD Alert...Adult ICU Electrolyte Replacement per Pharmacy   PHARMACY TO DOSE    acetaminophen  650 mg Q4HRS PRN       Assessment and Plan:  Hospital day #5  POD # 2 Right craniotomy evac of subdural hematoma, resection subdural membrane   Prophylactic anticoagulation: no         Start date/time: TBD     Plan:  Stable  HVAC Dc'd reinforce dressing   Subdural lowered to level of shoulder, open at 0cm H20, keep open, okay to clamp while up   CT in am, if unremarkable, okay to DC subdural drain   Cont. 100% non-rebreather   PT/OT, DC planning   Q2 neuro   Keep incision clean and dry

## 2022-09-16 NOTE — ASSESSMENT & PLAN NOTE
Pneumocephalus post right craniotomy with evacuation of subdural hematoma and resection of subdural membrane  CT head postop showed pneumocephalus-we will continue oxygen augmentation to decrease pulmonary nitrogen levels which will create a nitrogen gradient causing nitrogen pneumocephalus to diffuse into the lungs via blood.

## 2022-09-16 NOTE — CARE PLAN
The patient is Watcher - Medium risk of patient condition declining or worsening    Shift Goals  Clinical Goals: SBP <160, mobilize, pain control, stable neuro assessment  Patient Goals: Pain control  Family Goals: EMMANUEL    Progress made toward(s) clinical / shift goals:    Problem: Skin Integrity  Goal: Skin integrity is maintained or improved  Outcome: Progressing     Problem: Pain - Standard  Goal: Alleviation of pain or a reduction in pain to the patient’s comfort goal  Outcome: Progressing       Patient is not progressing towards the following goals:      Problem: Fall Risk  Goal: Patient will remain free from falls  Outcome: Not Progressing

## 2022-09-16 NOTE — ANESTHESIA POSTPROCEDURE EVALUATION
Patient: Alec Howell    Procedure Summary     Date: 09/14/22 Room / Location: [facility] OR 04 / SURGERY [facility]    Anesthesia Start: 1521 Anesthesia Stop: 1745    Procedure: RIGHT CRANIOTOMY FOR SUBDURAL HEMATOMA EVACUATION (Right: Head) Diagnosis: (HEMATOMA)    Surgeons: Raúl Gleason M.D. Responsible Provider: Jabari Frank M.D.    Anesthesia Type: general ASA Status: 3          Final Anesthesia Type: general  Last vitals  BP   Blood Pressure : (!) 172/83    Temp   36.5 °C (97.7 °F)    Pulse   (!) 104   Resp   15    SpO2   100 %      Anesthesia Post Evaluation    Patient location during evaluation: PACU  Patient participation: complete - patient participated  Level of consciousness: awake and alert    Airway patency: patent  Anesthetic complications: no  Cardiovascular status: hemodynamically stable  Respiratory status: acceptable  Hydration status: euvolemic    PONV: none          There were no known notable events for this encounter.     Nurse Pain Score: 0 (NPRS)

## 2022-09-17 ENCOUNTER — APPOINTMENT (OUTPATIENT)
Dept: RADIOLOGY | Facility: MEDICAL CENTER | Age: 82
DRG: 025 | End: 2022-09-17
Attending: NEUROLOGICAL SURGERY
Payer: MEDICARE

## 2022-09-17 PROBLEM — I25.10 CORONARY ARTERY DISEASE INVOLVING NATIVE CORONARY ARTERY OF NATIVE HEART WITHOUT ANGINA PECTORIS: Status: ACTIVE | Noted: 2022-01-03

## 2022-09-17 LAB
ANION GAP SERPL CALC-SCNC: 8 MMOL/L (ref 7–16)
BASOPHILS # BLD AUTO: 0.7 % (ref 0–1.8)
BASOPHILS # BLD: 0.1 K/UL (ref 0–0.12)
BUN SERPL-MCNC: 17 MG/DL (ref 8–22)
CALCIUM SERPL-MCNC: 8.9 MG/DL (ref 8.5–10.5)
CHLORIDE SERPL-SCNC: 105 MMOL/L (ref 96–112)
CO2 SERPL-SCNC: 22 MMOL/L (ref 20–33)
CREAT SERPL-MCNC: 0.98 MG/DL (ref 0.5–1.4)
EOSINOPHIL # BLD AUTO: 0.14 K/UL (ref 0–0.51)
EOSINOPHIL NFR BLD: 1 % (ref 0–6.9)
ERYTHROCYTE [DISTWIDTH] IN BLOOD BY AUTOMATED COUNT: 43.8 FL (ref 35.9–50)
GFR SERPLBLD CREATININE-BSD FMLA CKD-EPI: 78 ML/MIN/1.73 M 2
GLUCOSE BLD STRIP.AUTO-MCNC: 146 MG/DL (ref 65–99)
GLUCOSE BLD STRIP.AUTO-MCNC: 197 MG/DL (ref 65–99)
GLUCOSE BLD STRIP.AUTO-MCNC: 203 MG/DL (ref 65–99)
GLUCOSE BLD STRIP.AUTO-MCNC: 94 MG/DL (ref 65–99)
GLUCOSE SERPL-MCNC: 96 MG/DL (ref 65–99)
HCT VFR BLD AUTO: 30.8 % (ref 42–52)
HGB BLD-MCNC: 9.8 G/DL (ref 14–18)
IMM GRANULOCYTES # BLD AUTO: 0.11 K/UL (ref 0–0.11)
IMM GRANULOCYTES NFR BLD AUTO: 0.8 % (ref 0–0.9)
LYMPHOCYTES # BLD AUTO: 2.28 K/UL (ref 1–4.8)
LYMPHOCYTES NFR BLD: 16.3 % (ref 22–41)
MAGNESIUM SERPL-MCNC: 1.9 MG/DL (ref 1.5–2.5)
MCH RBC QN AUTO: 30.8 PG (ref 27–33)
MCHC RBC AUTO-ENTMCNC: 31.8 G/DL (ref 33.7–35.3)
MCV RBC AUTO: 96.9 FL (ref 81.4–97.8)
MONOCYTES # BLD AUTO: 1.52 K/UL (ref 0–0.85)
MONOCYTES NFR BLD AUTO: 10.9 % (ref 0–13.4)
NEUTROPHILS # BLD AUTO: 9.83 K/UL (ref 1.82–7.42)
NEUTROPHILS NFR BLD: 70.3 % (ref 44–72)
NRBC # BLD AUTO: 0 K/UL
NRBC BLD-RTO: 0 /100 WBC
PHOSPHATE SERPL-MCNC: 3.2 MG/DL (ref 2.5–4.5)
PLATELET # BLD AUTO: 260 K/UL (ref 164–446)
PMV BLD AUTO: 11.4 FL (ref 9–12.9)
POTASSIUM SERPL-SCNC: 4.2 MMOL/L (ref 3.6–5.5)
RBC # BLD AUTO: 3.18 M/UL (ref 4.7–6.1)
SODIUM SERPL-SCNC: 135 MMOL/L (ref 135–145)
WBC # BLD AUTO: 14 K/UL (ref 4.8–10.8)

## 2022-09-17 PROCEDURE — A9270 NON-COVERED ITEM OR SERVICE: HCPCS | Performed by: STUDENT IN AN ORGANIZED HEALTH CARE EDUCATION/TRAINING PROGRAM

## 2022-09-17 PROCEDURE — 70551 MRI BRAIN STEM W/O DYE: CPT

## 2022-09-17 PROCEDURE — 700101 HCHG RX REV CODE 250: Performed by: INTERNAL MEDICINE

## 2022-09-17 PROCEDURE — 700102 HCHG RX REV CODE 250 W/ 637 OVERRIDE(OP): Performed by: PHYSICAL MEDICINE & REHABILITATION

## 2022-09-17 PROCEDURE — 770001 HCHG ROOM/CARE - MED/SURG/GYN PRIV*

## 2022-09-17 PROCEDURE — A9270 NON-COVERED ITEM OR SERVICE: HCPCS | Performed by: INTERNAL MEDICINE

## 2022-09-17 PROCEDURE — 700102 HCHG RX REV CODE 250 W/ 637 OVERRIDE(OP): Performed by: NURSE PRACTITIONER

## 2022-09-17 PROCEDURE — 700102 HCHG RX REV CODE 250 W/ 637 OVERRIDE(OP): Performed by: STUDENT IN AN ORGANIZED HEALTH CARE EDUCATION/TRAINING PROGRAM

## 2022-09-17 PROCEDURE — 80048 BASIC METABOLIC PNL TOTAL CA: CPT

## 2022-09-17 PROCEDURE — 83735 ASSAY OF MAGNESIUM: CPT

## 2022-09-17 PROCEDURE — 700111 HCHG RX REV CODE 636 W/ 250 OVERRIDE (IP): Performed by: NURSE PRACTITIONER

## 2022-09-17 PROCEDURE — 700102 HCHG RX REV CODE 250 W/ 637 OVERRIDE(OP): Performed by: INTERNAL MEDICINE

## 2022-09-17 PROCEDURE — 84100 ASSAY OF PHOSPHORUS: CPT

## 2022-09-17 PROCEDURE — 99223 1ST HOSP IP/OBS HIGH 75: CPT | Performed by: STUDENT IN AN ORGANIZED HEALTH CARE EDUCATION/TRAINING PROGRAM

## 2022-09-17 PROCEDURE — A9270 NON-COVERED ITEM OR SERVICE: HCPCS | Performed by: PHYSICAL MEDICINE & REHABILITATION

## 2022-09-17 PROCEDURE — 82962 GLUCOSE BLOOD TEST: CPT

## 2022-09-17 PROCEDURE — 70450 CT HEAD/BRAIN W/O DYE: CPT

## 2022-09-17 PROCEDURE — 51798 US URINE CAPACITY MEASURE: CPT

## 2022-09-17 PROCEDURE — 99223 1ST HOSP IP/OBS HIGH 75: CPT | Performed by: PHYSICAL MEDICINE & REHABILITATION

## 2022-09-17 PROCEDURE — A9270 NON-COVERED ITEM OR SERVICE: HCPCS | Performed by: NURSE PRACTITIONER

## 2022-09-17 PROCEDURE — 99233 SBSQ HOSP IP/OBS HIGH 50: CPT | Performed by: NURSE PRACTITIONER

## 2022-09-17 PROCEDURE — 85025 COMPLETE CBC W/AUTO DIFF WBC: CPT

## 2022-09-17 RX ORDER — CHOLECALCIFEROL (VITAMIN D3) 125 MCG
5 CAPSULE ORAL NIGHTLY
Status: DISCONTINUED | OUTPATIENT
Start: 2022-09-17 | End: 2022-09-27 | Stop reason: HOSPADM

## 2022-09-17 RX ORDER — MAGNESIUM SULFATE HEPTAHYDRATE 40 MG/ML
2 INJECTION, SOLUTION INTRAVENOUS ONCE
Status: COMPLETED | OUTPATIENT
Start: 2022-09-17 | End: 2022-09-17

## 2022-09-17 RX ORDER — LEVETIRACETAM 500 MG/1
500 TABLET ORAL 2 TIMES DAILY
Status: DISCONTINUED | OUTPATIENT
Start: 2022-09-17 | End: 2022-09-27 | Stop reason: HOSPADM

## 2022-09-17 RX ORDER — GABAPENTIN 300 MG/1
300 CAPSULE ORAL 3 TIMES DAILY
Status: DISCONTINUED | OUTPATIENT
Start: 2022-09-17 | End: 2022-09-27 | Stop reason: HOSPADM

## 2022-09-17 RX ADMIN — THERA TABS 1 TABLET: TAB at 05:51

## 2022-09-17 RX ADMIN — ZONISAMIDE 100 MG: 50 CAPSULE ORAL at 21:36

## 2022-09-17 RX ADMIN — INSULIN HUMAN 2 UNITS: 100 INJECTION, SOLUTION PARENTERAL at 12:44

## 2022-09-17 RX ADMIN — GABAPENTIN 300 MG: 300 CAPSULE ORAL at 17:26

## 2022-09-17 RX ADMIN — MAGNESIUM SULFATE HEPTAHYDRATE 2 G: 40 INJECTION, SOLUTION INTRAVENOUS at 06:30

## 2022-09-17 RX ADMIN — ACETAMINOPHEN 650 MG: 325 TABLET, FILM COATED ORAL at 06:20

## 2022-09-17 RX ADMIN — LEVETIRACETAM 500 MG: 500 TABLET, FILM COATED ORAL at 17:26

## 2022-09-17 RX ADMIN — POLYETHYLENE GLYCOL 3350 1 PACKET: 17 POWDER, FOR SOLUTION ORAL at 11:10

## 2022-09-17 RX ADMIN — Medication 100 MG: at 05:51

## 2022-09-17 RX ADMIN — ACETAMINOPHEN 650 MG: 325 TABLET, FILM COATED ORAL at 12:36

## 2022-09-17 RX ADMIN — DOCUSATE SODIUM 50 MG AND SENNOSIDES 8.6 MG 2 TABLET: 8.6; 5 TABLET, FILM COATED ORAL at 17:26

## 2022-09-17 RX ADMIN — GABAPENTIN 100 MG: 100 CAPSULE ORAL at 05:51

## 2022-09-17 RX ADMIN — INSULIN GLARGINE-YFGN 7 UNITS: 100 INJECTION, SOLUTION SUBCUTANEOUS at 17:26

## 2022-09-17 RX ADMIN — Medication 5 MG: at 21:36

## 2022-09-17 RX ADMIN — INSULIN HUMAN 3 UNITS: 100 INJECTION, SOLUTION PARENTERAL at 21:42

## 2022-09-17 RX ADMIN — DOCUSATE SODIUM 50 MG AND SENNOSIDES 8.6 MG 2 TABLET: 8.6; 5 TABLET, FILM COATED ORAL at 05:51

## 2022-09-17 RX ADMIN — LABETALOL HYDROCHLORIDE 10 MG: 5 INJECTION, SOLUTION INTRAVENOUS at 06:19

## 2022-09-17 RX ADMIN — ATORVASTATIN CALCIUM 40 MG: 40 TABLET, FILM COATED ORAL at 21:36

## 2022-09-17 RX ADMIN — AMLODIPINE BESYLATE 10 MG: 10 TABLET ORAL at 05:51

## 2022-09-17 RX ADMIN — GABAPENTIN 100 MG: 100 CAPSULE ORAL at 11:10

## 2022-09-17 ASSESSMENT — ENCOUNTER SYMPTOMS
VOMITING: 0
SORE THROAT: 0
NECK PAIN: 0
NAUSEA: 0
COUGH: 0
WEAKNESS: 0
ORTHOPNEA: 0
SPUTUM PRODUCTION: 0
FEVER: 0
WEAKNESS: 1
DIZZINESS: 0
DEPRESSION: 0
DOUBLE VISION: 0
CHILLS: 0
BLURRED VISION: 0
FLANK PAIN: 0
MYALGIAS: 1
MYALGIAS: 0
SEIZURES: 0
FOCAL WEAKNESS: 0
HEARTBURN: 0
SHORTNESS OF BREATH: 0
ABDOMINAL PAIN: 0
HEADACHES: 0
PALPITATIONS: 0

## 2022-09-17 ASSESSMENT — PAIN DESCRIPTION - PAIN TYPE
TYPE: ACUTE PAIN
TYPE: ACUTE PAIN

## 2022-09-17 ASSESSMENT — FIBROSIS 4 INDEX
FIB4 SCORE: 1.75
FIB4 SCORE: 1.75

## 2022-09-17 NOTE — ASSESSMENT & PLAN NOTE
Pneumocephalus post right craniotomy with evacuation of subdural hematoma and resection of subdural membrane    CT head postop showed pneumocephalus-we will continue oxygen augmentation to decrease pulmonary nitrogen levels which will create a nitrogen gradient causing nitrogen pneumocephalus to diffuse into the lungs via blood.    Possibly continue for another 24-48hrs

## 2022-09-17 NOTE — PROGRESS NOTES
Patient drank a sip of water and began drooling out of left side of mouth, new left sided facial droop noted. Dr. Taylor and  notified of changes. STAT MRI ordered.     Per MRI tech, will not be able to complete the STAT MRI for 4-5 hours. Dr. Clay and Dr. Taylor notified.

## 2022-09-17 NOTE — CARE PLAN
The patient is Stable - Low risk of patient condition declining or worsening    Shift Goals  Clinical Goals: stable neuro exams, safety, RASS 0 to -1  Patient Goals: none stated  Family Goals: EMMANUEL      Problem: Knowledge Deficit - Standard  Goal: Patient and family/care givers will demonstrate understanding of plan of care, disease process/condition, diagnostic tests and medications  Outcome: Not Met     Problem: Fall Risk  Goal: Patient will remain free from falls  Outcome: Not Met     Problem: Pain - Standard  Goal: Alleviation of pain or a reduction in pain to the patient’s comfort goal  Outcome: Not Met

## 2022-09-17 NOTE — ASSESSMENT & PLAN NOTE
Right-sided subdural hematoma with mass-effect  S/p right frontal parietal temporal craniotomy with evacuation of subdural hematoma and resection of subdural membrane. EVD placement 9/14  -Serial follow up CT scan has been stable with acute concenrs  -Subdural drain removed 9/17/2022  -Hemovac removed 9/16/2022.   -SBP goal of less than 160.   -on Keppra  -SCDs - DVT PPX started, ok by neurosurgery  -No anticoag for 10 days  -No asa    Maintain eunatremic, euglycemic, normothermic  PT/OT  Physiatry     Mri 9/17 Right subdural hematoma with postsurgical changes. Multiple chronic lacunar infarcts.  Remove staples on 9/28  Pending placement SNF versus rehab

## 2022-09-17 NOTE — PROGRESS NOTES
Pt transferred to unit with techs via hospital bed. Aspiration/fall/seizure precautions in place. Pt resting in bed comfortably, family member at bedside

## 2022-09-17 NOTE — CONSULTS
"    Physical Medicine and Rehabilitation Consultation              Date of initial consultation: 9/17/2022  Consulting provider: Cristóbal Taylor M.D.  Reason for consultation: assess for acute inpatient rehab appropriateness  LOS: 5 Day(s)    Chief complaint: Fall from toilet     HPI: The patient is a 82 y.o. right hand dominant male with a past medical history of diabetes and hypertension;  who presented on 9/12/2022  2:09 PM with headache, right shoulder pain and right hip pain.  Patient fell off of his toilet 2 days prior to admission and had positive head strike.  CT head found 2.1 cm subdural hematoma in the right frontal area and 1 mm right to left midline shift.  Patient was evaluated by Dr. Gleason of neurosurgery who reported obvious membrane formation and recommended craniotomy with membrane resection and subdural drain placement which was completed on 9/14/2022.    The patient currently reports that he is hard of hearing, has head pain not head ache. He is very clear that he lives alone and his son lives with him. He is agitated, states he's tired of \"pulling your finger and pushing my nose\". He endorses numb hand on the right.     ROS  Pertinent positives are mentioned in the HPI, all others reviewed and are negative.    Social Hx:  1 SH  1 RITA  With: Adult children.  Son works during the day.  He lives in [place].    THERAPY:  Restrictions: Fall risk  PT: Functional mobility   9/15: Walking 90 feet with front wheel walker at min assist    OT: ADLs  9/15: Max assist lower body dressing and toileting    SLP:   9/15: Soft and bite-size diet with thin liquids    IMAGING:  CT head 9/17/2022  1.  Right craniotomy defect and subdural drain again identified.  2.  Residual subdural fluid and hemorrhage again identified which appears similar to prior exam. Pneumocephalus is again noted.  3.  Midline shift to the left side measuring 2 mm is unchanged.  4.  No new intracranial hemorrhage is identified.   "     PROCEDURES:  Raúl Gleason MD 9/14/2022  1.  Right frontal parietal temporal craniotomy, evacuation acute, subacute subdural hematoma  2.  Resection subdural membrane  3.  Dural augmentation (Dura repair, [employer])  4.  Subdural drain placement  5.  Modifier 22: Significant increase in surgical time and technical difficulty given requirement for resection of extensive subdural membrane.  This required microsurgical dissection, increasing the surgical time by over 2 times expected time for subdural hematoma evacuation.    PMH:  Past Medical History:   Diagnosis Date    Diabetes (HCC)     Hypertension        PSH:  Past Surgical History:   Procedure Laterality Date    CRANIOTOMY Right 9/14/2022    Procedure: RIGHT CRANIOTOMY FOR SUBDURAL HEMATOMA EVACUATION;  Surgeon: Raúl Gleason M.D.;  Location: SURGERY [facility];  Service: Neurosurgery    UT TRANSURETHRAL ELEC-SURG PROSTATECTOM  4/8/2019    Procedure: TURP (TRANSURETHRAL RESECTION OF PROSTATE);  Surgeon: Celestino Solano M.D.;  Location: SURGERY [facility];  Service: Urology    CYSTOSCOPY  4/8/2019    Procedure: CYSTOSCOPY;  Surgeon: Celestino Solano M.D.;  Location: SURGERY [facility];  Service: Urology    CIRCUMCISION ADULT  4/8/2019    Procedure: CIRCUMCISION, ADULT;  Surgeon: Celestino Solano M.D.;  Location: SURGERY [facility];  Service: Urology    OTHER CARDIAC SURGERY      Cardiac stent x1       FHX:  Family History   Problem Relation Age of Onset    Alzheimer's Disease Mother     No Known Problems Father        Medications:  Current Facility-Administered Medications   Medication Dose    melatonin tablet 5 mg  5 mg    insulin GLARGINE (Lantus,Semglee) injection  7 Units    oxyCODONE immediate-release (ROXICODONE) tablet 5-10 mg  5-10 mg    morphine 4 MG/ML injection 2-4 mg  2-4 mg    thiamine (Vitamin B-1) tablet 100 mg  100 mg    multivitamin (THERAGRAN) tablet 1 Tablet  1 Tablet    senna-docusate (PERICOLACE or SENOKOT S) 8.6-50  "MG per tablet 2 Tablet  2 Tablet    And    polyethylene glycol/lytes (MIRALAX) PACKET 1 Packet  1 Packet    And    magnesium hydroxide (MILK OF MAGNESIA) suspension 30 mL  30 mL    And    bisacodyl (DULCOLAX) suppository 10 mg  10 mg    Respiratory Therapy Consult      ondansetron (ZOFRAN) syringe/vial injection 4 mg  4 mg    ondansetron (ZOFRAN ODT) dispertab 4 mg  4 mg    amLODIPine (NORVASC) tablet 10 mg  10 mg    atorvastatin (LIPITOR) tablet 40 mg  40 mg    gabapentin (NEURONTIN) capsule 100 mg  100 mg    zonisamide (ZONEGRAN) capsule 100 mg  100 mg    hydrALAZINE (APRESOLINE) injection 20 mg  20 mg    labetalol (NORMODYNE/TRANDATE) injection 10-20 mg  10-20 mg    insulin regular (HumuLIN R,NovoLIN R) injection  2-9 Units    And    dextrose 10 % BOLUS 25 g  25 g    MD Alert...ICU Electrolyte Replacement per Pharmacy      acetaminophen (Tylenol) tablet 650 mg  650 mg       Allergies:  No Known Allergies      Physical Exam:  Vitals: /63   Pulse 92   Temp 37.6 °C (99.7 °F) (Temporal)   Resp 14   Ht 1.778 m (5' 10\")   Wt 72.9 kg (160 lb 11.5 oz)   SpO2 100%   Gen: NAD  Head: right head with surgical incision open to air  Eyes/ Nose/ Mouth:  moist mucous membranes  Cardio: RRR, good distal perfusion, warm extremities  Pulm: normal respiratory effort, no cyanosis   Abd: Soft NTND, negative borborygmi   Ext: No peripheral edema. No calf tenderness. No clubbing.    Mental status:  A&Ox4 (person, place, date, situation) answers questions appropriately follows commands  Speech: fluent, no aphasia or dysarthria    Motor:      Upper Extremity  Myotome R L   Shoulder flexion C5 5 5   Elbow flexion C5 5 5   Wrist extension C6 5 5   Elbow extension C7 5 5   Finger flexion C8 5 5   Finger abduction T1 5 5     Lower Extremity Myotome R L   Hip flexion L2 5 5   Knee extension L3 5 5   Ankle dorsiflexion L4 5 5   Toe extension L5 5 5   Ankle plantarflexion S1 5 5     Sensory:   intact to light touch through " out    Coordination:   intact finger fabienne bilaterally    Labs: Reviewed and significant for   Recent Labs     09/15/22  0359 09/16/22  0320 09/17/22  0455   RBC 3.66* 2.97* 3.18*   HEMOGLOBIN 11.3* 9.2* 9.8*   HEMATOCRIT 35.1* 28.9* 30.8*   PLATELETCT 336 243 260     Recent Labs     09/16/22  0320 09/16/22  1815 09/17/22  0455   SODIUM 139 134* 135   POTASSIUM 3.3* 4.5 4.2   CHLORIDE 113* 103 105   CO2 19* 22 22   GLUCOSE 147* 234* 96   BUN 16 19 17   CREATININE 0.66 0.99 0.98   CALCIUM 6.6* 9.1 8.9     Recent Results (from the past 24 hour(s))   POCT glucose device results    Collection Time: 09/16/22 11:04 AM   Result Value Ref Range    POC Glucose, Blood 202 (H) 65 - 99 mg/dL   POCT glucose device results    Collection Time: 09/16/22  5:15 PM   Result Value Ref Range    POC Glucose, Blood 226 (H) 65 - 99 mg/dL   Basic Metabolic Panel    Collection Time: 09/16/22  6:15 PM   Result Value Ref Range    Sodium 134 (L) 135 - 145 mmol/L    Potassium 4.5 3.6 - 5.5 mmol/L    Chloride 103 96 - 112 mmol/L    Co2 22 20 - 33 mmol/L    Glucose 234 (H) 65 - 99 mg/dL    Bun 19 8 - 22 mg/dL    Creatinine 0.99 0.50 - 1.40 mg/dL    Calcium 9.1 8.5 - 10.5 mg/dL    Anion Gap 9.0 7.0 - 16.0   IONIZED CALCIUM    Collection Time: 09/16/22  6:15 PM   Result Value Ref Range    Ionized Calcium 1.2 1.1 - 1.3 mmol/L   MAGNESIUM    Collection Time: 09/16/22  6:15 PM   Result Value Ref Range    Magnesium 1.9 1.5 - 2.5 mg/dL   PHOSPHORUS    Collection Time: 09/16/22  6:15 PM   Result Value Ref Range    Phosphorus 2.6 2.5 - 4.5 mg/dL   ESTIMATED GFR    Collection Time: 09/16/22  6:15 PM   Result Value Ref Range    GFR (CKD-EPI) 77 >60 mL/min/1.73 m 2   POCT glucose device results    Collection Time: 09/16/22  8:39 PM   Result Value Ref Range    POC Glucose, Blood 171 (H) 65 - 99 mg/dL   CBC WITH DIFFERENTIAL    Collection Time: 09/17/22  4:55 AM   Result Value Ref Range    WBC 14.0 (H) 4.8 - 10.8 K/uL    RBC 3.18 (L) 4.70 - 6.10 M/uL     Hemoglobin 9.8 (L) 14.0 - 18.0 g/dL    Hematocrit 30.8 (L) 42.0 - 52.0 %    MCV 96.9 81.4 - 97.8 fL    MCH 30.8 27.0 - 33.0 pg    MCHC 31.8 (L) 33.7 - 35.3 g/dL    RDW 43.8 35.9 - 50.0 fL    Platelet Count 260 164 - 446 K/uL    MPV 11.4 9.0 - 12.9 fL    Neutrophils-Polys 70.30 44.00 - 72.00 %    Lymphocytes 16.30 (L) 22.00 - 41.00 %    Monocytes 10.90 0.00 - 13.40 %    Eosinophils 1.00 0.00 - 6.90 %    Basophils 0.70 0.00 - 1.80 %    Immature Granulocytes 0.80 0.00 - 0.90 %    Nucleated RBC 0.00 /100 WBC    Neutrophils (Absolute) 9.83 (H) 1.82 - 7.42 K/uL    Lymphs (Absolute) 2.28 1.00 - 4.80 K/uL    Monos (Absolute) 1.52 (H) 0.00 - 0.85 K/uL    Eos (Absolute) 0.14 0.00 - 0.51 K/uL    Baso (Absolute) 0.10 0.00 - 0.12 K/uL    Immature Granulocytes (abs) 0.11 0.00 - 0.11 K/uL    NRBC (Absolute) 0.00 K/uL   Basic Metabolic Panel    Collection Time: 09/17/22  4:55 AM   Result Value Ref Range    Sodium 135 135 - 145 mmol/L    Potassium 4.2 3.6 - 5.5 mmol/L    Chloride 105 96 - 112 mmol/L    Co2 22 20 - 33 mmol/L    Glucose 96 65 - 99 mg/dL    Bun 17 8 - 22 mg/dL    Creatinine 0.98 0.50 - 1.40 mg/dL    Calcium 8.9 8.5 - 10.5 mg/dL    Anion Gap 8.0 7.0 - 16.0   MAGNESIUM    Collection Time: 09/17/22  4:55 AM   Result Value Ref Range    Magnesium 1.9 1.5 - 2.5 mg/dL   PHOSPHORUS    Collection Time: 09/17/22  4:55 AM   Result Value Ref Range    Phosphorus 3.2 2.5 - 4.5 mg/dL   ESTIMATED GFR    Collection Time: 09/17/22  4:55 AM   Result Value Ref Range    GFR (CKD-EPI) 78 >60 mL/min/1.73 m 2   POCT glucose device results    Collection Time: 09/17/22  5:55 AM   Result Value Ref Range    POC Glucose, Blood 94 65 - 99 mg/dL         ASSESSMENT:  Patient is a 82 y.o. male admitted with SDH now s/p craniotomy for evacuation     [facility] Code / Diagnosis to Support: 0002.22 - Brain Dysfunction: Traumatic, Closed Injury    Rehabilitation: Impaired ADLs and mobility  Patient is a good candidate for inpatient rehab based on needs for PT,  OT, and speech therapy.  Patient will also benefit from family training.  Patient has a good discharge situation which will be home with son.     [employer] insurance does not have benefit with [facility]     All cases are subject to administrative review and recommendations may change    Disposition recommendations:  - Patient is a candidate for IPR, however he does not want to come at this time. I spoke with his son Zackary who wants to speak with his brother and would like to consider IPR. Zackary is not working at the moment but is watching grandchildren.   -  to follow up with family for choice  - Continue PT/OT and SLP while in house   - Increasing gabapentin to 300mg TID for paresthesias in right hand. High risk medication, labs reviewed, CrCl 59, GFR 78   - Appreciate NSG work up and care  -PMR will sign off, please reconsult or reach out via Vo[employer]te if further evaluation or medical management is requested    DVT PPX: SCDs      Thank you for allowing us to participate in the care of this patient.     Maxi Zaidi, DO   Physical Medicine and Rehabilitation     Please note that this dictation was created using voice recognition software. I have made every reasonable attempt to correct obvious errors, but there may be errors of grammar and possibly content that I did not discover before finalizing the note.

## 2022-09-17 NOTE — PROGRESS NOTES
Neurosurgery Progress Note    Subjective:  Patient stable this morning no concerns over the last 24 hours to CTs 1 last night and 1 this morning show stability without any concerns    Exam:  AAO, fluent speech, confused on place, thinks he is at the bank   3 PERRl, EOMI   -drift, tongue midline   MONTEMAYOR, FAC   Sensation intact throughout   Incision CDI, staples intact   HVAC 30ml/8hr serosang   EVD 17ml/8hr serosang   Eating, drinking   -BM     BP  Min: 116/55  Max: 163/76  Pulse  Av  Min: 90  Max: 114  Resp  Avg: 15.3  Min: 12  Max: 32  Temp  Av.9 °C (98.4 °F)  Min: 36.6 °C (97.9 °F)  Max: 37.6 °C (99.7 °F)  SpO2  Av.5 %  Min: 95 %  Max: 100 %    No data recorded    Recent Labs     09/15/22  0359 22  0320 22  0455   WBC 13.8* 13.8* 14.0*   RBC 3.66* 2.97* 3.18*   HEMOGLOBIN 11.3* 9.2* 9.8*   HEMATOCRIT 35.1* 28.9* 30.8*   MCV 95.9 97.3 96.9   MCH 30.9 31.0 30.8   MCHC 32.2* 31.8* 31.8*   RDW 44.1 45.0 43.8   PLATELETCT 336 243 260   MPV 11.1 10.8 11.4       Recent Labs     22  0320 22  1815 22  0455   SODIUM 139 134* 135   POTASSIUM 3.3* 4.5 4.2   CHLORIDE 113* 103 105   CO2 19* 22 22   GLUCOSE 147* 234* 96   BUN 16 19 17   CREATININE 0.66 0.99 0.98   CALCIUM 6.6* 9.1 8.9                     Intake/Output                         22 - 2259 22 - 22 0659      Total [id number] Total                 Intake    P.O.  1200  360 1560  500  -- 500    P.O. 6519 414 8961 500 -- 500    I.V.  650  -- 650  --  -- --    Magnesium Sulfate Volume 50 -- 50 -- -- --    Volume (mL) (NS infusion) 600 -- 600 -- -- --    IV Piggyback  643.2  -- 643.2  --  -- --    Volume (mL) (potassium phosphate 15 mmol in dextrose 5% 250 mL ivpb) 249.9 -- 249.9 -- -- --    Volume (mL) (calcium CHLORIDE 1,000 mg in dextrose 5% 100 mL IVPB) 393.3 -- 393.3 -- -- --    Total Intake 2493.2 360 2853.2 500 -- 500       Output    Urine    1100 3100  --   -- --    Number of Times Incontinent of Urine 2 x -- 2 x -- -- --    Urine Void (mL) 100 -- 100 -- -- --    Output (mL) (External Urinary Catheter (Condom)) 1900 1100 3000 -- -- --    Drains  63  21 84  10  -- 10    Output (mL) ([REMOVED] Closed/Suction Drain 1 Right Scalp Hemovac 10 Fr.) 10 -- 10 -- -- --    Output (mL) (ICP/Ventriculostomy Left Occipital region) 53 21 74 10 -- 10    Total Output 2063 1121 3184 10 -- 10       Net I/O     430.2 -761 -330.8 490 -- 490              Intake/Output Summary (Last 24 hours) at 9/17/2022 1103  Last data filed at 9/17/2022 1000  Gross per 24 hour   Intake 1807.92 ml   Output 2423 ml   Net -615.08 ml         $ Bladder Scan Results (mL): 114     melatonin  5 mg Nightly    insulin GLARGINE  7 Units Q EVENING    oxyCODONE immediate-release  5-10 mg Q4HRS PRN    morphine injection  2-4 mg Q4HRS PRN    thiamine  100 mg DAILY    multivitamin  1 Tablet DAILY    senna-docusate  2 Tablet BID    And    polyethylene glycol/lytes  1 Packet QDAY PRN    And    magnesium hydroxide  30 mL QDAY PRN    And    bisacodyl  10 mg QDAY PRN    Respiratory Therapy Consult   Continuous RT    ondansetron  4 mg Q4HRS PRN    ondansetron  4 mg Q4HRS PRN    amLODIPine  10 mg DAILY    atorvastatin  40 mg Nightly    gabapentin  100 mg TID    zonisamide  100 mg QHS    hydrALAZINE  20 mg Q4HRS PRN    labetalol  10-20 mg Q4HRS PRN    insulin regular  2-9 Units 4X/DAY ACHS    And    dextrose bolus  25 g Q15 MIN PRN    MD Alert...Adult ICU Electrolyte Replacement per Pharmacy   PHARMACY TO DOSE    acetaminophen  650 mg Q4HRS PRN       Assessment and Plan:  Hospital day #6  POD # 3 Right craniotomy evac of subdural hematoma, resection subdural membrane   Prophylactic anticoagulation: yes         Start date/time: Evening of 9/17/2022    Plan:  Stable  CT head this morning shows stability without any concerns for subdural drain  Subdural drain removed 9/17/2022  Hemovac removed 9/16/2022  Okay for every 4 hours  neurochecks and transfer to floor  Maintain Keppra 500 twice daily  Hold systemic anticoagulation for total of 10 days after surgery  Okay for DVT prophylaxis tonight  Okay for up out of bed with PT OT  30 minutes and EVD management

## 2022-09-17 NOTE — ASSESSMENT & PLAN NOTE
-Start daily thiamine  -Nutritional consult has been done- follow nutritional recommendations  -Start Multivitamin  -Dietary following

## 2022-09-17 NOTE — CONSULTS
Hospital Medicine Consultation    Date of Service  9/17/2022    Referring Physician  Cristóbal Taylor M.D; Livier Garcia    Consulting Physician  Cooper Alicea M.D.    Reason for Consultation  Transferring out of ICU/ continuation of care    History of Presenting Illness  Per H&P, ICU interval updates and consult notes:    81 y/o M with CAD s/p stent, primary HTN, BPH, diabetes type II who presented 9/12/2022 to the ED at [facility] as a transfer from outside facility with a chief complaint of headache, right shoulder and hip pain after falling off of his toilet 2 days prior.  He denied any LOC, chest pain or shortness of breath. He does state that he has had chronic right-sided weakness, as well as decreased sensation to his right side for the past year or so that has not improved or worsened.  Shoulder and hip films at outlying facility were found to be negative for any fractures.  CT of head showed a 2.1 cm subdural hematoma in the right frontal area with a 1 mm right to left midline shift, therefore he was transferred to [facility] for neurosurgical intervention and critical care management.     Neurosurgery evaluated the patient - given the size of hematoma, evacuation was indicated. Pt underwent Right frontal parietal temporal craniotomy, evacuation acute, subacute subdural hematoma and resection subdural membrane on 9/14. He was monitored in ICU post op with q1hr neuro check, symptom management and monitoring of HVAC and EVD. Subdural drain removed 9/17/2022  Hemovac removed 9/16/2022. Course was overall unremarakble except some agitation on 9/15 PM. Serial CT scan has been stable.     Today 9/17, deemed stable to transition to neurowatch q4hrs and transfer out of ICU. Hospital medicine was consulted for continuation of care.     At bedside, initially fixed [place] He expresses his understanding of where he is and why he is here. Declined acute complain.     Per  bedside RN, new left sided facial droop noted in the last hour - Neurosurg on call has been contacted - Planned for MRI.     Review of Systems  Review of Systems   Constitutional:  Positive for malaise/fatigue. Negative for chills and fever.   HENT:  Negative for sore throat.    Eyes:  Negative for blurred vision and double vision.   Respiratory:  Negative for cough, sputum production and shortness of breath.    Cardiovascular:  Negative for chest pain, palpitations, orthopnea and leg swelling.   Gastrointestinal:  Negative for abdominal pain, heartburn, nausea and vomiting.   Genitourinary:  Negative for dysuria, frequency and urgency.   Musculoskeletal:  Negative for myalgias and neck pain.   Neurological:  Positive for weakness. Negative for dizziness, focal weakness and headaches.   Psychiatric/Behavioral:  Negative for depression.      Past Medical History   has a past medical history of Diabetes (HCC) and Hypertension. CAD, BPH    Surgical History   has a past surgical history that includes other cardiac surgery; pr transurethral elec-surg prostatectom (4/8/2019); cystoscopy (4/8/2019); circumcision adult (4/8/2019); and craniotomy (Right, 9/14/2022).    Family History  family history includes Alzheimer's Disease in his mother; No Known Problems in his father.    Social History   reports that he has been smoking cigars. He has never used smokeless tobacco. He reports that he does not drink alcohol and does not use drugs.    Medications  Prior to Admission Medications   Prescriptions Last Dose Informant Patient Reported? Taking?   amLODIPine (NORVASC) 10 MG Tab unknown at unknown Patient's Home Pharmacy Yes Yes   Sig: Take 10 mg by mouth every day.   aspirin EC (ECOTRIN) 81 MG Tablet Delayed Response unknown at unknown Patient's Home Pharmacy Yes Yes   Sig: Take 81 mg by mouth every day.   atorvastatin (LIPITOR) 40 MG Tab unknown at unknown Patient's Home Pharmacy Yes Yes   Sig: Take 40 mg by mouth every  evening.   gabapentin (NEURONTIN) 100 MG Cap unknown at unknown Patient's Home Pharmacy Yes Yes   Sig: Take 100 mg by mouth 3 times a day.   zonisamide (ZONEGRAN) 50 MG capsule unknown at unknown Patient's Home Pharmacy Yes Yes   Sig: Take 100 mg by mouth at bedtime.      Facility-Administered Medications: None       Allergies  No Known Allergies    Physical Exam  Temp:  [36.6 °C (97.9 °F)-37.6 °C (99.7 °F)] 37.2 °C (98.9 °F)  Pulse:  [] 97  Resp:  [12-32] 18  BP: (116-163)/(55-77) 127/61  SpO2:  [95 %-100 %] 96 %    Physical Exam  Vitals and nursing note reviewed.   Constitutional:       General: He is not in acute distress.     Appearance: Normal appearance. He is not ill-appearing.   HENT:      Head: Normocephalic and atraumatic.      Mouth/Throat:      Mouth: Mucous membranes are moist.      Pharynx: Oropharynx is clear.   Eyes:      General: No scleral icterus.     Conjunctiva/sclera: Conjunctivae normal.   Cardiovascular:      Rate and Rhythm: Normal rate and regular rhythm.      Pulses: Normal pulses.      Heart sounds: Normal heart sounds.   Pulmonary:      Effort: Pulmonary effort is normal. No respiratory distress.      Breath sounds: Normal breath sounds. No wheezing.   Abdominal:      General: Bowel sounds are normal. There is no distension.      Palpations: Abdomen is soft.      Tenderness: There is no abdominal tenderness.   Musculoskeletal:         General: No swelling or tenderness. Normal range of motion.   Skin:     General: Skin is warm and dry.      Capillary Refill: Capillary refill takes less than 2 seconds.      Comments: Right craniotomy scar C/D/I  Staples in place   Neurological:      Mental Status: He is alert. Mental status is at baseline.      Comments: Left facial droop (new per report)  Motor 5/5 and symmetry    Psychiatric:         Mood and Affect: Mood normal.       Fluids  Date 09/17/22 0700 - 09/18/22 0659   Shift 3019-0128 1064-4972 9340-6283 24 Hour Total   INTAKE   P.O.  500   500   Shift Total 500   500   OUTPUT   Drains 10   10   Shift Total 10   10   Weight (kg) 69.4 69.4 69.4 69.4       Laboratory  Recent Labs     09/15/22  0359 09/16/22  0320 09/17/22  0455   WBC 13.8* 13.8* 14.0*   RBC 3.66* 2.97* 3.18*   HEMOGLOBIN 11.3* 9.2* 9.8*   HEMATOCRIT 35.1* 28.9* 30.8*   MCV 95.9 97.3 96.9   MCH 30.9 31.0 30.8   MCHC 32.2* 31.8* 31.8*   RDW 44.1 45.0 43.8   PLATELETCT 336 243 260   MPV 11.1 10.8 11.4     Recent Labs     09/16/22  0320 09/16/22  1815 09/17/22  0455   SODIUM 139 134* 135   POTASSIUM 3.3* 4.5 4.2   CHLORIDE 113* 103 105   CO2 19* 22 22   GLUCOSE 147* 234* 96   BUN 16 19 17   CREATININE 0.66 0.99 0.98   CALCIUM 6.6* 9.1 8.9                     Imaging  CT-HEAD W/O   Final Result         1.  Right craniotomy defect and subdural drain again identified.      2.  Residual subdural fluid and hemorrhage again identified which appears similar to prior exam. Pneumocephalus is again noted.      3.  Midline shift to the left side measuring 2 mm is unchanged.      4.  No new intracranial hemorrhage is identified.               CT-HEAD W/O   Final Result      Grossly similar postsurgical changes from right subdural hematoma evacuation with similar residual subdural hematoma and air .      Decreased midline shift, measuring about 2 mm.      No new hemorrhage.            CT-HEAD W/O   Final Result      Postsurgical changes status post right-sided subdural hematoma evacuation with residual subdural hematoma and air and a subdural drain in place.      Multiple chronic bilateral lacunar infarcts.         DX-CHEST-LIMITED (1 VIEW)   Final Result      1.  No acute cardiac or pulmonary abnormalities are identified.      OUTSIDE IMAGES-DX UPPER EXTREMITY, RIGHT   Final Result      [id number] FILM X-RAY   Final Result      OUTSIDE IMAGES-CT HEAD   Final Result      MR-BRAIN-W/O    (Results Pending)       Assessment/Plan  * Subdural hematoma (HCC)- (present on admission)  Assessment &  Plan  Right-sided subdural hematoma with mass-effect  -POD #3- right craniotomy with evacuation of subdural hematoma and resection of subdural membrane. EVD placement  -Serial follow up CT scan has been stable with acute concenrs  -Subdural drain removed 9/17/2022  -Hemovac removed 9/16/2022.   -SBP goal of less than 160.   -Receiving PO antihypertensives  -SCDs - DVT PPX starting today 9/17  -No anticoag for 10 days  -No asa    Maintain eunatremic, euglycemic, normothermic  PT/OT  Physiatry     9/17 around 1200: Per bedside RN, new left sided facial droop noted in the last hour - Neurosurg on call has been contacted - Planned for MRI.     Coronary artery disease involving native coronary artery of native heart without angina pectoris- (present on admission)  Assessment & Plan  C/w statin  Not on beta blocker or ACEI/ARB at home    Pneumocephalus  Assessment & Plan  Pneumocephalus post right craniotomy with evacuation of subdural hematoma and resection of subdural membrane    CT head postop showed pneumocephalus-we will continue oxygen augmentation to decrease pulmonary nitrogen levels which will create a nitrogen gradient causing nitrogen pneumocephalus to diffuse into the lungs via blood.    Possibly continue for another 24-48hrs    Severe malnutrition (HCC)- (present on admission)  Assessment & Plan  -Start daily thiamine  -Nutritional consult has been done- follow nutritionalrecommendations  -Start Multivitamin  -Dietary following    Electrolyte abnormality  Assessment & Plan  -Will continue to follow daily labs and replace as needed  -multivitamin and thiamine     Primary hypertension- (present on admission)  Assessment & Plan  Goal SBP less than 160  Continue amlodipine, 10 mg daily  IVP Antihypertensives as needed to keep within goal of SBP less than 160    Type 2 diabetes mellitus (HCC)- (present on admission)  Assessment & Plan  -Glycohemoglobin- 8.6  -Sliding scale insulin- adjust as needed  C/w glargine 7  units  -Monitor glucose with FSBS and CMP/BMP      DVT PPX: enoxaparin can be started after MRI Gulshan 9/17 resulted and appropriate

## 2022-09-17 NOTE — CARE PLAN
"The patient is Watcher - Medium risk of patient condition declining or worsening    Shift Goals  Clinical Goals: Stable neuro checks; Compliance with instruction; Safety  Patient Goals: \"Go home\"  Family Goals: EMMANUEL    Progress made toward(s) clinical / shift goals:    Problem: Knowledge Deficit - Standard  Goal: Patient and family/care givers will demonstrate understanding of plan of care, disease process/condition, diagnostic tests and medications  Outcome: Progressing     Problem: Skin Integrity  Goal: Skin integrity is maintained or improved  Outcome: Progressing     Problem: Pain - Standard  Goal: Alleviation of pain or a reduction in pain to the patient’s comfort goal  Outcome: Progressing       Patient is not progressing towards the following goals:      Problem: Fall Risk  Goal: Patient will remain free from falls  Outcome: Not Met     "

## 2022-09-17 NOTE — PROGRESS NOTES
Critical Care Progress Note    Date of admission  9/12/2022    Chief Complaint  82 y.o. male admitted 9/12/2022 post GLF complaining of right shoulder and hip pain    Hospital Course  82 yom with a PMHx of cardiac stent placement (approximately 10 years ago)  primary HTN, BPH, diabetes type II, who presented to the ED at [facility] as a transfer from outside facility with a chief complaint of headache, right shoulder and hip pain after falling off of his toilet 2 days ago.  He denies any LOC, chest pain or shortness of breath. He does state that he has had chronic right-sided weakness, as well as decreased sensation to his right side for the past year or so that has not improved or worsened.   Shoulder and hip films at outlying facility were found to be negative for any fractures.  CT of head showed a 2.1 cm subdural hematoma in the right frontal area with a 1 mm right to left midline shift, therefore he  was transferred to [facility] for neurosurgical intervention and critical care management.      Interval Problem Update  Reviewed last 24 hour events:    -POD #3 right craniotomy with evacuation of subdural hematoma and resection of subdural membrane.  -CT head last night consistent with surgical changes still showing some pneumocephalus- will continue another 24 hr  100% NRB   -EVD- drain at shoulder level last night  -Required 1 dose of Labetalol this a.m. to keep SBP within parameter goal of less than 160  -I-/O - 24 hr- Not accurate 2nd to condom cath falling off a few times during hospitalization with pads saturated with urine.  -DVT PPX- SCDs- TBD- NSG  -afebrile  -eating 50-75 % of meals - taking 100% of boost supplement  -NS @ 100- Dc'd today 2nd to good PO intake.   -Replacing- mag  -Encourage BM protocol- pt refusing at times  -Melatonin for nighttime sleep    - 1100-Dr. Clay at bedside and removed drains.  NSG okay with pt transferring to floor. Pt no longer requiring critical care management and  "will be transferred to neurosurgical floor in care of neurosurgery and hospitalist        Review of Systems  Review of Systems   Constitutional:  Negative for chills, fever and malaise/fatigue.   HENT:  Positive for hearing loss.    Eyes:  Negative for blurred vision.   Respiratory:  Negative for cough and shortness of breath.    Cardiovascular:  Negative for chest pain.   Gastrointestinal:  Negative for abdominal pain, heartburn, nausea and vomiting.   Genitourinary:  Positive for frequency and urgency. Negative for dysuria and flank pain.   Musculoskeletal:  Positive for myalgias.        Right arm and hip pain    Neurological:  Negative for dizziness, focal weakness, seizures, weakness and headaches.        \"My right side is still the same\"      Vital Signs for last 24 hours   Temp:  [36.6 °C (97.9 °F)-37.6 °C (99.7 °F)] 37.2 °C (98.9 °F)  Pulse:  [] 97  Resp:  [12-32] 18  BP: (116-163)/(55-77) 127/61  SpO2:  [95 %-100 %] 96 %    Hemodynamic parameters for last 24 hours       Respiratory Information for the last 24 hours       Physical Exam   Physical Exam  Vitals and nursing note reviewed.   Constitutional:       General: He is awake.      Appearance: Normal appearance.   HENT:      Head: Normocephalic.      Comments: Kerlix wrap to head- dry  intact without drainage   Drain - draining  serosang fluid. Drain positioned at shoulder ht.     Mouth/Throat:      Mouth: Mucous membranes are moist.   Eyes:      General: Lids are normal.      Pupils: Pupils are equal, round, and reactive to light.   Neck:      Trachea: Trachea and phonation normal.   Cardiovascular:      Rate and Rhythm: Regular rhythm. Tachycardia present.      Pulses:           Radial pulses are 2+ on the right side and 2+ on the left side.        Dorsalis pedis pulses are 1+ on the right side and 1+ on the left side.   Pulmonary:      Effort: Pulmonary effort is normal.      Breath sounds: Normal breath sounds and air entry.      Comments: " Without any SOB, respiratory distress  Abdominal:      General: Abdomen is flat. Bowel sounds are normal.      Palpations: Abdomen is soft.      Comments: BS present   Genitourinary:     Comments: Voiding clear yellow urine- condom cath in place  Musculoskeletal:      Cervical back: Normal range of motion.   Skin:     General: Skin is warm and dry.      Capillary Refill: Capillary refill takes less than 2 seconds.   Neurological:      Mental Status: He is alert and oriented to person, place, and time. Mental status is at baseline.      GCS: GCS eye subscore is 4. GCS verbal subscore is 5. GCS motor subscore is 6.      Comments: Kerlix to head- intact without obvious drainage  Aao x  person, place, event- confused to time and at times confused with conversation-   MONTEMAYOR spontaneously-right extremities are with decreased sensation and weak compared to left extremities       Psychiatric:         Mood and Affect: Mood normal.         Speech: Speech normal.       Medications  Current Facility-Administered Medications   Medication Dose Route Frequency Provider Last Rate Last Admin    melatonin tablet 5 mg  5 mg Oral Nightly Livier Garcia A.P.R.N.        insulin GLARGINE (Lantus,Semglee) injection  7 Units Subcutaneous Q EVENING Livier Garcia A.P.R.N.   7 Units at 09/16/22 1748    oxyCODONE immediate-release (ROXICODONE) tablet 5-10 mg  5-10 mg Oral Q4HRS PRN Livier Garcia A.P.R.N.   5 mg at 09/16/22 2206    morphine 4 MG/ML injection 2-4 mg  2-4 mg Intravenous Q4HRS PRN Livier Garcia A.P.R.N.        thiamine (Vitamin B-1) tablet 100 mg  100 mg Oral DAILY Cristóbal Taylor M.D.   100 mg at 09/17/22 0551    multivitamin (THERAGRAN) tablet 1 Tablet  1 Tablet Oral DAILY Cristóbal Taylor M.D.   1 Tablet at 09/17/22 0551    senna-docusate (PERICOLACE or SENOKOT S) 8.6-50 MG per tablet 2 Tablet  2 Tablet Oral BID Dc Leiva M.D.   2 Tablet at 09/17/22 0551    And    polyethylene glycol/lytes (MIRALAX) PACKET 1 Packet  1  Packet Oral QDAY PRN Dc Leiva M.D.   1 Packet at 09/17/22 1110    And    magnesium hydroxide (MILK OF MAGNESIA) suspension 30 mL  30 mL Oral QDAY PRN Dc Leiva M.D.        And    bisacodyl (DULCOLAX) suppository 10 mg  10 mg Rectal QDAY PRN Dc Leiva M.D.        Respiratory Therapy Consult   Nebulization Continuous RT Dc Leiva M.D.        ondansetron (ZOFRAN) syringe/vial injection 4 mg  4 mg Intravenous Q4HRS PRN Dc Leiva M.D.   4 mg at 09/15/22 0426    ondansetron (ZOFRAN ODT) dispertab 4 mg  4 mg Oral Q4HRS PRN Dc Leiva M.D.        amLODIPine (NORVASC) tablet 10 mg  10 mg Oral DAILY Dc Leiva M.D.   10 mg at 09/17/22 0551    atorvastatin (LIPITOR) tablet 40 mg  40 mg Oral Nightly Dc Leiva M.D.   40 mg at 09/16/22 2032    gabapentin (NEURONTIN) capsule 100 mg  100 mg Oral TID Dc Leiva M.D.   100 mg at 09/17/22 1110    zonisamide (ZONEGRAN) capsule 100 mg  100 mg Oral QHS Dc Leiva M.D.   100 mg at 09/16/22 2032    hydrALAZINE (APRESOLINE) injection 20 mg  20 mg Intravenous Q4HRS PRN Dc Leiva M.D.   20 mg at 09/13/22 1810    labetalol (NORMODYNE/TRANDATE) injection 10-20 mg  10-20 mg Intravenous Q4HRS PRN Dc Leiva M.D.   10 mg at 09/17/22 0619    insulin regular (HumuLIN R,NovoLIN R) injection  2-9 Units Subcutaneous 4X/DAY ACHAPRIL Leiva M.D.   2 Units at 09/16/22 2041    And    dextrose 10 % BOLUS 25 g  25 g Intravenous Q15 MIN PRN Dc Leiva M.D. MD Alert...ICU Electrolyte Replacement per Pharmacy   Other PHARMACY TO DOSE Dc Leiva M.D.        acetaminophen (Tylenol) tablet 650 mg  650 mg Oral Q4HRS PRN Giselle Medley   650 mg at 09/17/22 0620       Fluids    Intake/Output Summary (Last 24 hours) at 9/17/2022 1240  Last data filed at 9/17/2022 1000  Gross per 24 hour   Intake 1340 ml   Output 2221 ml   Net  -881 ml       Laboratory          Recent Labs     09/16/22  0320 09/16/22 1815 09/17/22  0455   SODIUM 139 134* 135   POTASSIUM 3.3* 4.5 4.2   CHLORIDE 113* 103 105   CO2 19* 22 22   BUN 16 19 17   CREATININE 0.66 0.99 0.98   MAGNESIUM 1.7 1.9 1.9   PHOSPHORUS 2.2* 2.6 3.2   CALCIUM 6.6* 9.1 8.9     Recent Labs     09/16/22  0320 09/16/22 1815 09/17/22  0455   GLUCOSE 147* 234* 96     Recent Labs     09/15/22  0359 09/16/22 0320 09/17/22  0455   WBC 13.8* 13.8* 14.0*   NEUTSPOLYS 81.10* 72.70* 70.30   LYMPHOCYTES 10.70* 16.00* 16.30*   MONOCYTES 5.90 8.90 10.90   EOSINOPHILS 0.60 1.20 1.00   BASOPHILS 0.80 0.60 0.70     Recent Labs     09/15/22  0359 09/16/22 0320 09/17/22  0455   RBC 3.66* 2.97* 3.18*   HEMOGLOBIN 11.3* 9.2* 9.8*   HEMATOCRIT 35.1* 28.9* 30.8*   PLATELETCT 336 243 260       Imaging  No films today    Assessment/Plan  * Subdural hematoma (HCC)- (present on admission)  Assessment & Plan  Right-sided subdural hematoma with mass-effect  -POD #3- right craniotomy with evacuation of subdural hematoma and resection of subdural membrane. EVD placement  -CT head shows pneumocephalus- placed on 100% NRB - for nitrogen washout  -EVD- output- possible removal of drain today 9/17  -Required 1 dose of Labetalol this am to keep SBP within parameter goal of less than 160.   -Receiving PO antihypertensives.  -SCDs only no chem DVT PPX - NSG to determine  -No asa  Maintain eunatremic, euglycemic, normothermic  -Melatonin to help promote sleep    Pneumocephalus  Assessment & Plan  Pneumocephalus post right craniotomy with evacuation of subdural hematoma and resection of subdural membrane  CT head postop showed pneumocephalus-we will continue oxygen augmentation to decrease pulmonary nitrogen levels which will create a nitrogen gradient causing nitrogen pneumocephalus to diffuse into the lungs via blood.    Severe malnutrition (HCC)- (present on admission)  Assessment & Plan    -Start daily thiamine  -Nutritional  consult has been done- follow nutritionalrecommendations  -Start Multivitamin  -dietary following    Electrolyte abnormality  Assessment & Plan  -Actively replacing mag, phos, K and calcium- will get BMP tonight and in am   -Will continue to follow daily labs- cmp and replace as needed  -multivitamin and thiamine   -dietary consult ordered- following with recs  - pt taking adequate PO and therefore IV fluid dcd   -Poor I and O measurements due to condom cath with episodes of being removed with unkn amount of urine output noted on pads    Primary hypertension- (present on admission)  Assessment & Plan  Goal SBP less than 160  Continue amlodipine, 10 mg daily  IVP Antihypertensives as needed to keep within goal of SBP less than 160    Type 2 diabetes mellitus (HCC)- (present on admission)  Assessment & Plan  -Glycohemoglobin- 8.6  -Sliding scale insulin-   -Lantus added for better control  -Monitor glucose with FSBS and CMP/BMP          VTE:  Contraindicated  Ulcer: Not Indicated  Lines: None    I have performed a physical exam and reviewed and updated ROS and Plan today (9/17/2022). In review of yesterday's note (9/16/2022), there are no changes except as documented above.     Discussed patient condition and risk of morbidity and/or mortality with RN, RT, Pharmacy, Charge nurse / hot rounds, Patient, and neurosurgery  I directly provided coordinated care as well as   data review.  No time overlap and excludes procedures.

## 2022-09-17 NOTE — ASSESSMENT & PLAN NOTE
-Glycohemoglobin- 8.6  -Sliding scale insulin- adjust as needed  C/w glargine 7 units  -Monitor glucose with FSBS and CMP/BMP

## 2022-09-18 ENCOUNTER — APPOINTMENT (OUTPATIENT)
Dept: RADIOLOGY | Facility: MEDICAL CENTER | Age: 82
DRG: 025 | End: 2022-09-18
Attending: NURSE PRACTITIONER
Payer: MEDICARE

## 2022-09-18 PROBLEM — D72.829 LEUKOCYTOSIS: Status: ACTIVE | Noted: 2022-09-18

## 2022-09-18 PROBLEM — G93.40 ENCEPHALOPATHY: Status: ACTIVE | Noted: 2022-09-18

## 2022-09-18 LAB
ANION GAP SERPL CALC-SCNC: 10 MMOL/L (ref 7–16)
BASOPHILS # BLD AUTO: 0.8 % (ref 0–1.8)
BASOPHILS # BLD: 0.12 K/UL (ref 0–0.12)
BUN SERPL-MCNC: 18 MG/DL (ref 8–22)
CA-I SERPL-SCNC: 1.2 MMOL/L (ref 1.1–1.3)
CALCIUM SERPL-MCNC: 9 MG/DL (ref 8.5–10.5)
CHLORIDE SERPL-SCNC: 103 MMOL/L (ref 96–112)
CO2 SERPL-SCNC: 22 MMOL/L (ref 20–33)
CREAT SERPL-MCNC: 0.89 MG/DL (ref 0.5–1.4)
EOSINOPHIL # BLD AUTO: 0.16 K/UL (ref 0–0.51)
EOSINOPHIL NFR BLD: 1 % (ref 0–6.9)
ERYTHROCYTE [DISTWIDTH] IN BLOOD BY AUTOMATED COUNT: 42.6 FL (ref 35.9–50)
GFR SERPLBLD CREATININE-BSD FMLA CKD-EPI: 86 ML/MIN/1.73 M 2
GLUCOSE BLD STRIP.AUTO-MCNC: 138 MG/DL (ref 65–99)
GLUCOSE BLD STRIP.AUTO-MCNC: 138 MG/DL (ref 65–99)
GLUCOSE BLD STRIP.AUTO-MCNC: 166 MG/DL (ref 65–99)
GLUCOSE BLD STRIP.AUTO-MCNC: 199 MG/DL (ref 65–99)
GLUCOSE SERPL-MCNC: 116 MG/DL (ref 65–99)
HCT VFR BLD AUTO: 29.9 % (ref 42–52)
HGB BLD-MCNC: 9.8 G/DL (ref 14–18)
IMM GRANULOCYTES # BLD AUTO: 0.09 K/UL (ref 0–0.11)
IMM GRANULOCYTES NFR BLD AUTO: 0.6 % (ref 0–0.9)
LYMPHOCYTES # BLD AUTO: 1.54 K/UL (ref 1–4.8)
LYMPHOCYTES NFR BLD: 9.9 % (ref 22–41)
MAGNESIUM SERPL-MCNC: 1.9 MG/DL (ref 1.5–2.5)
MCH RBC QN AUTO: 30.9 PG (ref 27–33)
MCHC RBC AUTO-ENTMCNC: 32.8 G/DL (ref 33.7–35.3)
MCV RBC AUTO: 94.3 FL (ref 81.4–97.8)
MONOCYTES # BLD AUTO: 1.42 K/UL (ref 0–0.85)
MONOCYTES NFR BLD AUTO: 9.1 % (ref 0–13.4)
NEUTROPHILS # BLD AUTO: 12.27 K/UL (ref 1.82–7.42)
NEUTROPHILS NFR BLD: 78.6 % (ref 44–72)
NRBC # BLD AUTO: 0 K/UL
NRBC BLD-RTO: 0 /100 WBC
NT-PROBNP SERPL IA-MCNC: 697 PG/ML (ref 0–125)
PHOSPHATE SERPL-MCNC: 2.9 MG/DL (ref 2.5–4.5)
PLATELET # BLD AUTO: 292 K/UL (ref 164–446)
PMV BLD AUTO: 11.2 FL (ref 9–12.9)
POTASSIUM SERPL-SCNC: 3.9 MMOL/L (ref 3.6–5.5)
PROCALCITONIN SERPL-MCNC: 0.19 NG/ML
RBC # BLD AUTO: 3.17 M/UL (ref 4.7–6.1)
SODIUM SERPL-SCNC: 135 MMOL/L (ref 135–145)
WBC # BLD AUTO: 15.6 K/UL (ref 4.8–10.8)

## 2022-09-18 PROCEDURE — 87077 CULTURE AEROBIC IDENTIFY: CPT

## 2022-09-18 PROCEDURE — 84100 ASSAY OF PHOSPHORUS: CPT

## 2022-09-18 PROCEDURE — 700111 HCHG RX REV CODE 636 W/ 250 OVERRIDE (IP): Performed by: STUDENT IN AN ORGANIZED HEALTH CARE EDUCATION/TRAINING PROGRAM

## 2022-09-18 PROCEDURE — 700102 HCHG RX REV CODE 250 W/ 637 OVERRIDE(OP): Performed by: INTERNAL MEDICINE

## 2022-09-18 PROCEDURE — 71045 X-RAY EXAM CHEST 1 VIEW: CPT

## 2022-09-18 PROCEDURE — 700102 HCHG RX REV CODE 250 W/ 637 OVERRIDE(OP): Performed by: PHYSICAL MEDICINE & REHABILITATION

## 2022-09-18 PROCEDURE — 82962 GLUCOSE BLOOD TEST: CPT | Mod: 91

## 2022-09-18 PROCEDURE — A9270 NON-COVERED ITEM OR SERVICE: HCPCS | Performed by: STUDENT IN AN ORGANIZED HEALTH CARE EDUCATION/TRAINING PROGRAM

## 2022-09-18 PROCEDURE — 87040 BLOOD CULTURE FOR BACTERIA: CPT | Mod: 91

## 2022-09-18 PROCEDURE — A9270 NON-COVERED ITEM OR SERVICE: HCPCS | Performed by: PHYSICAL MEDICINE & REHABILITATION

## 2022-09-18 PROCEDURE — 700102 HCHG RX REV CODE 250 W/ 637 OVERRIDE(OP): Performed by: STUDENT IN AN ORGANIZED HEALTH CARE EDUCATION/TRAINING PROGRAM

## 2022-09-18 PROCEDURE — 99233 SBSQ HOSP IP/OBS HIGH 50: CPT | Performed by: STUDENT IN AN ORGANIZED HEALTH CARE EDUCATION/TRAINING PROGRAM

## 2022-09-18 PROCEDURE — A9270 NON-COVERED ITEM OR SERVICE: HCPCS | Performed by: NURSE PRACTITIONER

## 2022-09-18 PROCEDURE — 82330 ASSAY OF CALCIUM: CPT

## 2022-09-18 PROCEDURE — 83735 ASSAY OF MAGNESIUM: CPT

## 2022-09-18 PROCEDURE — A9270 NON-COVERED ITEM OR SERVICE: HCPCS | Performed by: INTERNAL MEDICINE

## 2022-09-18 PROCEDURE — 84145 PROCALCITONIN (PCT): CPT

## 2022-09-18 PROCEDURE — 770001 HCHG ROOM/CARE - MED/SURG/GYN PRIV*

## 2022-09-18 PROCEDURE — 700102 HCHG RX REV CODE 250 W/ 637 OVERRIDE(OP): Performed by: NURSE PRACTITIONER

## 2022-09-18 PROCEDURE — 83880 ASSAY OF NATRIURETIC PEPTIDE: CPT

## 2022-09-18 PROCEDURE — 36415 COLL VENOUS BLD VENIPUNCTURE: CPT

## 2022-09-18 PROCEDURE — 80048 BASIC METABOLIC PNL TOTAL CA: CPT

## 2022-09-18 PROCEDURE — 700105 HCHG RX REV CODE 258: Performed by: STUDENT IN AN ORGANIZED HEALTH CARE EDUCATION/TRAINING PROGRAM

## 2022-09-18 PROCEDURE — 85025 COMPLETE CBC W/AUTO DIFF WBC: CPT

## 2022-09-18 RX ORDER — ENOXAPARIN SODIUM 100 MG/ML
40 INJECTION SUBCUTANEOUS DAILY
Status: DISCONTINUED | OUTPATIENT
Start: 2022-09-18 | End: 2022-09-27 | Stop reason: HOSPADM

## 2022-09-18 RX ADMIN — GABAPENTIN 300 MG: 300 CAPSULE ORAL at 06:36

## 2022-09-18 RX ADMIN — LEVETIRACETAM 500 MG: 500 TABLET, FILM COATED ORAL at 18:16

## 2022-09-18 RX ADMIN — GABAPENTIN 300 MG: 300 CAPSULE ORAL at 18:15

## 2022-09-18 RX ADMIN — AMPICILLIN SODIUM AND SULBACTAM SODIUM 3 G: 2; 1 INJECTION, POWDER, FOR SOLUTION INTRAMUSCULAR; INTRAVENOUS at 11:27

## 2022-09-18 RX ADMIN — Medication 100 MG: at 06:36

## 2022-09-18 RX ADMIN — INSULIN HUMAN 2 UNITS: 100 INJECTION, SOLUTION PARENTERAL at 11:44

## 2022-09-18 RX ADMIN — INSULIN HUMAN 2 UNITS: 100 INJECTION, SOLUTION PARENTERAL at 20:27

## 2022-09-18 RX ADMIN — AMLODIPINE BESYLATE 10 MG: 10 TABLET ORAL at 06:33

## 2022-09-18 RX ADMIN — GABAPENTIN 300 MG: 300 CAPSULE ORAL at 11:26

## 2022-09-18 RX ADMIN — ENOXAPARIN SODIUM 40 MG: 40 INJECTION SUBCUTANEOUS at 18:16

## 2022-09-18 RX ADMIN — Medication 5 MG: at 20:40

## 2022-09-18 RX ADMIN — INSULIN GLARGINE-YFGN 7 UNITS: 100 INJECTION, SOLUTION SUBCUTANEOUS at 18:18

## 2022-09-18 RX ADMIN — THERA TABS 1 TABLET: TAB at 06:33

## 2022-09-18 RX ADMIN — AMPICILLIN SODIUM AND SULBACTAM SODIUM 3 G: 2; 1 INJECTION, POWDER, FOR SOLUTION INTRAMUSCULAR; INTRAVENOUS at 23:45

## 2022-09-18 RX ADMIN — AMPICILLIN SODIUM AND SULBACTAM SODIUM 3 G: 2; 1 INJECTION, POWDER, FOR SOLUTION INTRAMUSCULAR; INTRAVENOUS at 18:23

## 2022-09-18 RX ADMIN — LEVETIRACETAM 500 MG: 500 TABLET, FILM COATED ORAL at 06:33

## 2022-09-18 RX ADMIN — ZONISAMIDE 100 MG: 50 CAPSULE ORAL at 20:40

## 2022-09-18 RX ADMIN — DOCUSATE SODIUM 50 MG AND SENNOSIDES 8.6 MG 2 TABLET: 8.6; 5 TABLET, FILM COATED ORAL at 18:15

## 2022-09-18 RX ADMIN — ATORVASTATIN CALCIUM 40 MG: 40 TABLET, FILM COATED ORAL at 20:40

## 2022-09-18 ASSESSMENT — PAIN DESCRIPTION - PAIN TYPE: TYPE: ACUTE PAIN

## 2022-09-18 NOTE — PROGRESS NOTES
Hospital Medicine Daily Progress Note    Date of Service  9/18/2022    Chief Complaint  Alec Howell is a 82 y.o. male admitted 9/12/2022 with SDH    Hospital Course  81 y/o M with CAD s/p stent, primary HTN, BPH, diabetes type II who presented 9/12/2022 to the ED at [facility] as a transfer from outside facility with a chief complaint of headache, right shoulder and hip pain after falling off of his toilet 2 days prior.  He denied any LOC, chest pain or shortness of breath. He does state that he has had chronic right-sided weakness, as well as decreased sensation to his right side for the past year or so that has not improved or worsened.  Shoulder and hip films at outlying facility were found to be negative for any fractures.  CT of head showed a 2.1 cm subdural hematoma in the right frontal area with a 1 mm right to left midline shift, therefore he was transferred to [facility] for neurosurgical intervention and critical care management.     Neurosurgery evaluated the patient - given the size of hematoma, evacuation was indicated. Pt underwent Right frontal parietal temporal craniotomy, evacuation acute, subacute subdural hematoma and resection subdural membrane on 9/14. He was monitored in ICU post op with q1hr neuro check, symptom management and monitoring of HVAC and EVD. Subdural drain removed 9/17/2022  Hemovac removed 9/16/2022. Patient transferred to neuro floor on 9/17.    He is on nonrebreather mask for pneumocephalus.    Interval Problem Update  Seen patient at bedside.   Very drowsy, able to answer simple questions. States he is in [place] and hospital.   On oxymask  Discussed with neurosurgery NP, ordered CXR, procalcitonin and proBNP. Start unasyn empirically to cover aspiration pneumonia  Blood culture  I have discussed this patient's plan of care and discharge plan at IDT rounds today with Case Management, Nursing, Nursing leadership, and other members of the IDT  team.    Consultants/Specialty  critical care and neurosurgery    Code Status  Full Code    Disposition  Patient is not medically cleared for discharge.   Anticipate discharge to to skilled nursing facility.  I have placed the appropriate orders for post-discharge needs.    Review of Systems  Review of Systems   Unable to perform ROS: Mental acuity   All other systems reviewed and are negative.     Physical Exam  Temp:  [37.2 °C (99 °F)-37.3 °C (99.1 °F)] 37.3 °C (99.1 °F)  Pulse:  [] 98  Resp:  [17-18] 18  BP: (127-153)/(65-86) 128/83  SpO2:  [94 %-100 %] 96 %    Physical Exam  Vitals and nursing note reviewed.   Constitutional:       Appearance: Normal appearance. He is ill-appearing.      Comments: Drowsy, able to answer simple questions   HENT:      Head: Normocephalic and atraumatic.      Comments: Right craniotomy scar C/D/I  Staples in place      Mouth/Throat:      Pharynx: Oropharynx is clear.   Eyes:      Pupils: Pupils are equal, round, and reactive to light.   Neck:      Vascular: No carotid bruit.   Cardiovascular:      Rate and Rhythm: Normal rate and regular rhythm.   Pulmonary:      Effort: Pulmonary effort is normal.      Breath sounds: Rales present.      Comments: On oxygen mask  Abdominal:      General: Abdomen is flat. Bowel sounds are normal. There is no distension.      Palpations: Abdomen is soft. There is no mass.      Tenderness: There is no abdominal tenderness.   Musculoskeletal:         General: Normal range of motion.      Cervical back: Neck supple.   Skin:     General: Skin is warm and dry.   Neurological:      Mental Status: He is alert.      Comments: Drowsy, only able to answer simple questions  Move extremities spontaneously   Unable to complete neuro exam due to somnolence    Psychiatric:      Comments: Unable to assess       Fluids  No intake or output data in the 24 hours ending 09/18/22 1347    Laboratory  Recent Labs     09/16/22  0320 09/17/22  0455 09/18/22  0442   WBC  13.8* 14.0* 15.6*   RBC 2.97* 3.18* 3.17*   HEMOGLOBIN 9.2* 9.8* 9.8*   HEMATOCRIT 28.9* 30.8* 29.9*   MCV 97.3 96.9 94.3   MCH 31.0 30.8 30.9   MCHC 31.8* 31.8* 32.8*   RDW 45.0 43.8 42.6   PLATELETCT 243 260 292   MPV 10.8 11.4 11.2     Recent Labs     09/16/22  1815 09/17/22  0455 09/18/22  0442   SODIUM 134* 135 135   POTASSIUM 4.5 4.2 3.9   CHLORIDE 103 105 103   CO2 22 22 22   GLUCOSE 234* 96 116*   BUN 19 17 18   CREATININE 0.99 0.98 0.89   CALCIUM 9.1 8.9 9.0                   Imaging  DX-CHEST-PORTABLE (1 VIEW)   Final Result      Hypoinflation without other evidence for acute cardiopulmonary disease.      MR-BRAIN-W/O   Final Result      1.  Right subdural hematoma with postsurgical changes.   2.  Multiple chronic lacunar infarcts.   3.  Severe chronic microvascular ischemic disease.   4.  Moderate volume loss.      CT-HEAD W/O   Final Result         1.  Right craniotomy defect and subdural drain again identified.      2.  Residual subdural fluid and hemorrhage again identified which appears similar to prior exam. Pneumocephalus is again noted.      3.  Midline shift to the left side measuring 2 mm is unchanged.      4.  No new intracranial hemorrhage is identified.               CT-HEAD W/O   Final Result      Grossly similar postsurgical changes from right subdural hematoma evacuation with similar residual subdural hematoma and air .      Decreased midline shift, measuring about 2 mm.      No new hemorrhage.            CT-HEAD W/O   Final Result      Postsurgical changes status post right-sided subdural hematoma evacuation with residual subdural hematoma and air and a subdural drain in place.      Multiple chronic bilateral lacunar infarcts.         DX-CHEST-LIMITED (1 VIEW)   Final Result      1.  No acute cardiac or pulmonary abnormalities are identified.      OUTSIDE IMAGES-DX UPPER EXTREMITY, RIGHT   Final Result      [id number]-AAPOROM FILM X-RAY   Final Result      OUTSIDE IMAGES-CT HEAD   Final  Result           Assessment/Plan  * Subdural hematoma (HCC)- (present on admission)  Assessment & Plan  Right-sided subdural hematoma with mass-effect  S/p right frontal parietal temporal craniotomy with evacuation of subdural hematoma and resection of subdural membrane. EVD placement 9/14  -Serial follow up CT scan has been stable with acute concenrs  -Subdural drain removed 9/17/2022  -Hemovac removed 9/16/2022.   -SBP goal of less than 160.   -on Keppra  -SCDs - DVT PPX started, ok by neurosurgery  -No anticoag for 10 days  -No asa    Maintain eunatremic, euglycemic, normothermic  PT/OT  Physiatry     Mri 9/17 Right subdural hematoma with postsurgical changes. Multiple chronic lacunar infarcts.    Leukocytosis  Assessment & Plan  No fever, but given worsening mental status, concerning aspiration pneumonia  Check CXR, procalcitonin, blood culture  Start empirical abx Unasyn     Encephalopathy  Assessment & Plan  Multifactorial, sec to SDH with pneumocephalus, delirium, possibly complicated with aspiration pneumonia  CT head 9/17 noted Right craniotomy defect and subdural drain again identified. Residual subdural fluid and hemorrhage again identified which appears similar to prior exam  Start empirical antibiotics  Cont monitoring  Minimize risk of delirium such as avoiding day time napping and promote night time sleep, monitor for constipation, remove lines/tubing that is not needed, avoid early lab draws and vital checks, limit polypharmacy as able, and keep close to the window    Coronary artery disease involving native coronary artery of native heart without angina pectoris- (present on admission)  Assessment & Plan  C/w statin  Not on beta blocker or ACEI/ARB at home    Pneumocephalus  Assessment & Plan  Pneumocephalus post right craniotomy with evacuation of subdural hematoma and resection of subdural membrane    CT head postop showed pneumocephalus-we will continue oxygen augmentation to decrease pulmonary  nitrogen levels which will create a nitrogen gradient causing nitrogen pneumocephalus to diffuse into the lungs via blood.    Possibly continue for another 24-48hrs    Severe malnutrition (HCC)- (present on admission)  Assessment & Plan  -Start daily thiamine  -Nutritional consult has been done- follow nutritional recommendations  -Start Multivitamin  -Dietary following    Electrolyte abnormality- (present on admission)  Assessment & Plan  -Will continue to follow daily labs and replace as needed  -multivitamin and thiamine     Primary hypertension- (present on admission)  Assessment & Plan  Goal SBP less than 160  Continue amlodipine, 10 mg daily  IVP Antihypertensives as needed to keep within goal of SBP less than 160    Type 2 diabetes mellitus (HCC)- (present on admission)  Assessment & Plan  -Glycohemoglobin- 8.6  -Sliding scale insulin- adjust as needed  C/w glargine 7 units  -Monitor glucose with FSBS and CMP/BMP       VTE prophylaxis: SCDs/TEDs and enoxaparin ppx    I have performed a physical exam and reviewed and updated ROS and Plan today (9/18/2022). In review of yesterday's note (9/17/2022), there are no changes except as documented above.

## 2022-09-18 NOTE — PROGRESS NOTES
4 Eyes Skin Assessment Completed by GRETCHEN Lawson and GRETCHEN Chandra.    Head Incision, staples on R side of his head  Ears WDL  Nose WDL  Mouth WDL  Neck WDL  Breast/Chest WDL  Shoulder Blades WDL  Spine WDL  (R) Arm/Elbow/Hand Bruising  (L) Arm/Elbow/Hand Bruising  Abdomen Bruising  Groin Redness and Blanching  Scrotum/Coccyx/Buttocks Redness and Blanching  (R) Leg Redness, Blanching, and Abrasion, scabs    (L) Leg Redness, Blanching, and Scab  (R) Heel/Foot/Toe Redness and Blanching  (L) Heel/Foot/Toe Redness and Blanching          Devices In Places Pulse Ox      Interventions In Place Sacral Mepilex, Waffle Overlay, Pillows, Q2 Turns, Heels Loaded W/Pillows, and Pressure Redistribution Mattress    Possible Skin Injury No    Pictures Uploaded Into Epic N/A  Wound Consult Placed N/A  RN Wound Prevention Protocol Ordered No

## 2022-09-18 NOTE — CARE PLAN
The patient is Stable - Low risk of patient condition declining or worsening    Shift Goals  Clinical Goals: Stable neuro exam, complete CT  Patient Goals: Drink water  Family Goals: EMMANUEL    Progress made toward(s) clinical / shift goals:    CT completed  Neuro stable during the shift    Patient is not progressing towards the following goals:

## 2022-09-18 NOTE — CARE PLAN
The patient is Stable - Low risk of patient condition declining or worsening    Shift Goals  Clinical Goals: stable neuro, safety  Patient Goals: Go outside  Family Goals: EMMANUEL    Progress made toward(s) clinical / shift goals:  Patient intermittently confused through day. Orientation varied. Q2 turns in place, fall precautions in place, call bell within in reach. Family updated on POC.     Problem: Skin Integrity  Goal: Skin integrity is maintained or improved  Outcome: Progressing     Problem: Fall Risk  Goal: Patient will remain free from falls  Outcome: Progressing       Patient is not progressing towards the following goals:

## 2022-09-18 NOTE — CARE PLAN
Problem: Knowledge Deficit - Standard  Goal: Patient and family/care givers will demonstrate understanding of plan of care, disease process/condition, diagnostic tests and medications  Outcome: Progressing  Note: Discussed POC and medications with patient.  Patient verbalized understanding.     The patient is Stable - Low risk of patient condition declining or worsening    Shift Goals  Clinical Goals: Stable neuro exam, complete CT  Patient Goals: Drink water  Family Goals: EMMANUEL

## 2022-09-18 NOTE — PROGRESS NOTES
Neurosurgery Progress Note    Subjective:      Exam:  Very drowsy, but answers some questions  Following some commands but unable to complete neuro exam due to somnolence  Nonrebreather mask for pneumocephalus?  Wet cough/voice  Incision CDI and flat        BP  Min: 127/65  Max: 153/77  Pulse  Av.9  Min: 90  Max: 108  Resp  Av.4  Min: 16  Max: 24  Temp  Av.2 °C (99 °F)  Min: 37.1 °C (98.8 °F)  Max: 37.3 °C (99.1 °F)  SpO2  Av.9 %  Min: 94 %  Max: 100 %    No data recorded    Recent Labs     22  0320 22  0455 22  0442   WBC 13.8* 14.0* 15.6*   RBC 2.97* 3.18* 3.17*   HEMOGLOBIN 9.2* 9.8* 9.8*   HEMATOCRIT 28.9* 30.8* 29.9*   MCV 97.3 96.9 94.3   MCH 31.0 30.8 30.9   MCHC 31.8* 31.8* 32.8*   RDW 45.0 43.8 42.6   PLATELETCT 243 260 292   MPV 10.8 11.4 11.2       Recent Labs     22  1815 22  0455 22  0442   SODIUM 134* 135 135   POTASSIUM 4.5 4.2 3.9   CHLORIDE 103 105 103   CO2  22   GLUCOSE 234* 96 116*   BUN  18   CREATININE 0.99 0.98 0.89   CALCIUM 9.1 8.9 9.0                     Intake/Output                         22 - 2259 22 - 22 0659      Total  Total                 Intake    P.O.  500  -- 500  --  -- --    P.O. 500 -- 500 -- -- --    Total Intake 500 -- 500 -- -- --       Output    Urine  200  -- 200  --  -- --    Output (mL) (External Urinary Catheter (Condom)) 200 -- 200 -- -- --    Drains  10  -- 10  --  -- --    Output (mL) ([REMOVED] ICP/Ventriculostomy Left Occipital region) 10 -- 10 -- -- --    Total Output 210 -- 210 -- -- --       Net I/O     290 -- 290 -- -- --              Intake/Output Summary (Last 24 hours) at 2022 1032  Last data filed at 2022 1200  Gross per 24 hour   Intake --   Output 200 ml   Net -200 ml               melatonin  5 mg Nightly    levETIRAcetam  500 mg BID    gabapentin  300 mg TID    insulin GLARGINE  7 Units Q EVENING     oxyCODONE immediate-release  5-10 mg Q4HRS PRN    morphine injection  2-4 mg Q4HRS PRN    thiamine  100 mg DAILY    multivitamin  1 Tablet DAILY    senna-docusate  2 Tablet BID    And    polyethylene glycol/lytes  1 Packet QDAY PRN    And    magnesium hydroxide  30 mL QDAY PRN    And    bisacodyl  10 mg QDAY PRN    Respiratory Therapy Consult   Continuous RT    ondansetron  4 mg Q4HRS PRN    ondansetron  4 mg Q4HRS PRN    amLODIPine  10 mg DAILY    atorvastatin  40 mg Nightly    zonisamide  100 mg QHS    hydrALAZINE  20 mg Q4HRS PRN    labetalol  10-20 mg Q4HRS PRN    insulin regular  2-9 Units 4X/DAY ACHS    And    dextrose bolus  25 g Q15 MIN PRN    acetaminophen  650 mg Q4HRS PRN       Assessment and Plan:  Hospital day #7  POD # 4 Right craniotomy evac of subdural hematoma, resection subdural membrane   Prophylactic anticoagulation: yes         Start date/time: Evening of 9/17/2022    Plan:    CT head 9/17 morning shows stability without any concerns for subdural drain  Subdural drain removed 9/17/2022  Hemovac removed 9/16/2022  Maintain Keppra 500 twice daily  Hold systemic full anticoagulation for total of 10 days after surgery  Okay for DVT prophylaxis tonight  Okay for up out of bed with PT/OT    Wet cough/voice  WBC slightly trending up  Will obtain chest x-ray  Discussed with Dr. Clay  Will transfer back to ICU   Possible aspiration/pneumonia?  Discussed with Livier LONG    **ADDENDUM**    Saw the patient with Yola GODINEZ and EMY Maier who are familiar with the patient. His mentation seems to be at his baseline and he woke up more.  Discussed plan of care and they did not think the patient needs ICU care at this point.  I discussed with Dr. Patton as well, will consider starting him on antibiotic and MICU care.

## 2022-09-18 NOTE — ASSESSMENT & PLAN NOTE
Multifactorial, sec to SDH with pneumocephalus, delirium, possibly complicated with aspiration pneumonia  CT head 9/17 noted Right craniotomy defect and subdural drain again identified. Residual subdural fluid and hemorrhage again identified which appears similar to prior exam  Start empirical antibiotics  Cont monitoring  Minimize risk of delirium such as avoiding day time napping and promote night time sleep, monitor for constipation, remove lines/tubing that is not needed, avoid early lab draws and vital checks, limit polypharmacy as able, and keep close to the window

## 2022-09-18 NOTE — ASSESSMENT & PLAN NOTE
No fever, but given worsening mental status, concerning aspiration pneumonia  Check CXR, procalcitonin, blood culture  Leukocytosis had initially resolved after treatment with Unasyn however now is elevated again.  Procalcitonin ordered.  9/25: Procalcitonin was normal we will continue to monitor off of antibiotics.

## 2022-09-18 NOTE — PROGRESS NOTES
Neurosurgery Progress Note    Subjective:      Exam:  Very drowsy, but answers some questions  Following some commands but unable to complete neuro exam due to somnolence  Nonrebreather mask for pneumocephalus?  Wet cough/voice  Incision CDI and flat        BP  Min: 127/65  Max: 153/77  Pulse  Av.9  Min: 90  Max: 108  Resp  Av.4  Min: 16  Max: 24  Temp  Av.2 °C (99 °F)  Min: 37.1 °C (98.8 °F)  Max: 37.3 °C (99.1 °F)  SpO2  Av.9 %  Min: 94 %  Max: 100 %    No data recorded    Recent Labs     22  0320 22  0455 22  0442   WBC 13.8* 14.0* 15.6*   RBC 2.97* 3.18* 3.17*   HEMOGLOBIN 9.2* 9.8* 9.8*   HEMATOCRIT 28.9* 30.8* 29.9*   MCV 97.3 96.9 94.3   MCH 31.0 30.8 30.9   MCHC 31.8* 31.8* 32.8*   RDW 45.0 43.8 42.6   PLATELETCT 243 260 292   MPV 10.8 11.4 11.2       Recent Labs     22  1815 22  0455 22  0442   SODIUM 134* 135 135   POTASSIUM 4.5 4.2 3.9   CHLORIDE 103 105 103   CO2  22   GLUCOSE 234* 96 116*   BUN  18   CREATININE 0.99 0.98 0.89   CALCIUM 9.1 8.9 9.0                     Intake/Output                         22 - 2259 22 - 22 0659      Total  Total                 Intake    P.O.  500  -- 500  --  -- --    P.O. 500 -- 500 -- -- --    Total Intake 500 -- 500 -- -- --       Output    Urine  200  -- 200  --  -- --    Output (mL) (External Urinary Catheter (Condom)) 200 -- 200 -- -- --    Drains  10  -- 10  --  -- --    Output (mL) ([REMOVED] ICP/Ventriculostomy Left Occipital region) 10 -- 10 -- -- --    Total Output 210 -- 210 -- -- --       Net I/O     290 -- 290 -- -- --              Intake/Output Summary (Last 24 hours) at 2022 1008  Last data filed at 2022 1200  Gross per 24 hour   Intake --   Output 200 ml   Net -200 ml               melatonin  5 mg Nightly    levETIRAcetam  500 mg BID    gabapentin  300 mg TID    insulin GLARGINE  7 Units Q EVENING     oxyCODONE immediate-release  5-10 mg Q4HRS PRN    morphine injection  2-4 mg Q4HRS PRN    thiamine  100 mg DAILY    multivitamin  1 Tablet DAILY    senna-docusate  2 Tablet BID    And    polyethylene glycol/lytes  1 Packet QDAY PRN    And    magnesium hydroxide  30 mL QDAY PRN    And    bisacodyl  10 mg QDAY PRN    Respiratory Therapy Consult   Continuous RT    ondansetron  4 mg Q4HRS PRN    ondansetron  4 mg Q4HRS PRN    amLODIPine  10 mg DAILY    atorvastatin  40 mg Nightly    zonisamide  100 mg QHS    hydrALAZINE  20 mg Q4HRS PRN    labetalol  10-20 mg Q4HRS PRN    insulin regular  2-9 Units 4X/DAY ACHS    And    dextrose bolus  25 g Q15 MIN PRN    acetaminophen  650 mg Q4HRS PRN       Assessment and Plan:  Hospital day #7  POD # 4 Right craniotomy evac of subdural hematoma, resection subdural membrane   Prophylactic anticoagulation: yes         Start date/time: Evening of 9/17/2022    Plan:    CT head 9/17 morning shows stability without any concerns for subdural drain  Subdural drain removed 9/17/2022  Hemovac removed 9/16/2022  Maintain Keppra 500 twice daily  Hold systemic full anticoagulation for total of 10 days after surgery  Okay for DVT prophylaxis tonight  Okay for up out of bed with PT/OT    Wet cough/voice  WBC slightly trending up  Will obtain chest x-ray    Discussed with Dr. Clay  Will transfer back to ICU   Possible aspiration/pneumonia?  Discussed with Livier LONG

## 2022-09-18 NOTE — PROGRESS NOTES
Patient okay to transfer to floor prior to MRI and with acute neuro change this am per Dr. Taylor and BOB Maier. Report given to Luul.

## 2022-09-19 LAB
ANION GAP SERPL CALC-SCNC: 14 MMOL/L (ref 7–16)
BASOPHILS # BLD AUTO: 0.9 % (ref 0–1.8)
BASOPHILS # BLD: 0.12 K/UL (ref 0–0.12)
BUN SERPL-MCNC: 17 MG/DL (ref 8–22)
CA-I SERPL-SCNC: 1.2 MMOL/L (ref 1.1–1.3)
CALCIUM SERPL-MCNC: 8.6 MG/DL (ref 8.5–10.5)
CHLORIDE SERPL-SCNC: 103 MMOL/L (ref 96–112)
CO2 SERPL-SCNC: 20 MMOL/L (ref 20–33)
COMMENT 1642: NORMAL
CREAT SERPL-MCNC: 0.86 MG/DL (ref 0.5–1.4)
EOSINOPHIL # BLD AUTO: 0.22 K/UL (ref 0–0.51)
EOSINOPHIL NFR BLD: 1.7 % (ref 0–6.9)
ERYTHROCYTE [DISTWIDTH] IN BLOOD BY AUTOMATED COUNT: 40.9 FL (ref 35.9–50)
GFR SERPLBLD CREATININE-BSD FMLA CKD-EPI: 87 ML/MIN/1.73 M 2
GLUCOSE BLD STRIP.AUTO-MCNC: 123 MG/DL (ref 65–99)
GLUCOSE BLD STRIP.AUTO-MCNC: 135 MG/DL (ref 65–99)
GLUCOSE BLD STRIP.AUTO-MCNC: 139 MG/DL (ref 65–99)
GLUCOSE BLD STRIP.AUTO-MCNC: 143 MG/DL (ref 65–99)
GLUCOSE SERPL-MCNC: 152 MG/DL (ref 65–99)
HCT VFR BLD AUTO: 31.4 % (ref 42–52)
HGB BLD-MCNC: 10.5 G/DL (ref 14–18)
IMM GRANULOCYTES # BLD AUTO: 0.1 K/UL (ref 0–0.11)
IMM GRANULOCYTES NFR BLD AUTO: 0.8 % (ref 0–0.9)
LYMPHOCYTES # BLD AUTO: 1.55 K/UL (ref 1–4.8)
LYMPHOCYTES NFR BLD: 12 % (ref 22–41)
MAGNESIUM SERPL-MCNC: 1.8 MG/DL (ref 1.5–2.5)
MCH RBC QN AUTO: 30.7 PG (ref 27–33)
MCHC RBC AUTO-ENTMCNC: 33.4 G/DL (ref 33.7–35.3)
MCV RBC AUTO: 91.8 FL (ref 81.4–97.8)
MONOCYTES # BLD AUTO: 1.3 K/UL (ref 0–0.85)
MONOCYTES NFR BLD AUTO: 10.1 % (ref 0–13.4)
MORPHOLOGY BLD-IMP: NORMAL
NEUTROPHILS # BLD AUTO: 9.58 K/UL (ref 1.82–7.42)
NEUTROPHILS NFR BLD: 74.5 % (ref 44–72)
NRBC # BLD AUTO: 0 K/UL
NRBC BLD-RTO: 0 /100 WBC
PHOSPHATE SERPL-MCNC: 3 MG/DL (ref 2.5–4.5)
PLATELET # BLD AUTO: 225 K/UL (ref 164–446)
PLATELET BLD QL SMEAR: NORMAL
PMV BLD AUTO: 11.3 FL (ref 9–12.9)
POTASSIUM SERPL-SCNC: 4.2 MMOL/L (ref 3.6–5.5)
RBC # BLD AUTO: 3.42 M/UL (ref 4.7–6.1)
SODIUM SERPL-SCNC: 137 MMOL/L (ref 135–145)
WBC # BLD AUTO: 12.9 K/UL (ref 4.8–10.8)

## 2022-09-19 PROCEDURE — 83735 ASSAY OF MAGNESIUM: CPT

## 2022-09-19 PROCEDURE — 700102 HCHG RX REV CODE 250 W/ 637 OVERRIDE(OP): Performed by: INTERNAL MEDICINE

## 2022-09-19 PROCEDURE — 700111 HCHG RX REV CODE 636 W/ 250 OVERRIDE (IP): Performed by: STUDENT IN AN ORGANIZED HEALTH CARE EDUCATION/TRAINING PROGRAM

## 2022-09-19 PROCEDURE — 80048 BASIC METABOLIC PNL TOTAL CA: CPT

## 2022-09-19 PROCEDURE — 700102 HCHG RX REV CODE 250 W/ 637 OVERRIDE(OP): Performed by: NURSE PRACTITIONER

## 2022-09-19 PROCEDURE — A9270 NON-COVERED ITEM OR SERVICE: HCPCS | Performed by: NURSE PRACTITIONER

## 2022-09-19 PROCEDURE — 99232 SBSQ HOSP IP/OBS MODERATE 35: CPT | Performed by: INTERNAL MEDICINE

## 2022-09-19 PROCEDURE — 700102 HCHG RX REV CODE 250 W/ 637 OVERRIDE(OP): Performed by: STUDENT IN AN ORGANIZED HEALTH CARE EDUCATION/TRAINING PROGRAM

## 2022-09-19 PROCEDURE — 84100 ASSAY OF PHOSPHORUS: CPT

## 2022-09-19 PROCEDURE — A9270 NON-COVERED ITEM OR SERVICE: HCPCS | Performed by: INTERNAL MEDICINE

## 2022-09-19 PROCEDURE — 770001 HCHG ROOM/CARE - MED/SURG/GYN PRIV*

## 2022-09-19 PROCEDURE — 700105 HCHG RX REV CODE 258: Performed by: STUDENT IN AN ORGANIZED HEALTH CARE EDUCATION/TRAINING PROGRAM

## 2022-09-19 PROCEDURE — A9270 NON-COVERED ITEM OR SERVICE: HCPCS | Performed by: STUDENT IN AN ORGANIZED HEALTH CARE EDUCATION/TRAINING PROGRAM

## 2022-09-19 PROCEDURE — 97535 SELF CARE MNGMENT TRAINING: CPT | Mod: CO

## 2022-09-19 PROCEDURE — A9270 NON-COVERED ITEM OR SERVICE: HCPCS | Performed by: PHYSICAL MEDICINE & REHABILITATION

## 2022-09-19 PROCEDURE — 85025 COMPLETE CBC W/AUTO DIFF WBC: CPT

## 2022-09-19 PROCEDURE — 82330 ASSAY OF CALCIUM: CPT

## 2022-09-19 PROCEDURE — 82962 GLUCOSE BLOOD TEST: CPT | Mod: 91

## 2022-09-19 PROCEDURE — 36415 COLL VENOUS BLD VENIPUNCTURE: CPT

## 2022-09-19 PROCEDURE — 700102 HCHG RX REV CODE 250 W/ 637 OVERRIDE(OP): Performed by: PHYSICAL MEDICINE & REHABILITATION

## 2022-09-19 RX ADMIN — GABAPENTIN 300 MG: 300 CAPSULE ORAL at 05:03

## 2022-09-19 RX ADMIN — GABAPENTIN 300 MG: 300 CAPSULE ORAL at 11:19

## 2022-09-19 RX ADMIN — Medication 5 MG: at 21:55

## 2022-09-19 RX ADMIN — OXYCODONE 5 MG: 5 TABLET ORAL at 21:55

## 2022-09-19 RX ADMIN — AMPICILLIN SODIUM AND SULBACTAM SODIUM 3 G: 2; 1 INJECTION, POWDER, FOR SOLUTION INTRAMUSCULAR; INTRAVENOUS at 23:30

## 2022-09-19 RX ADMIN — AMPICILLIN SODIUM AND SULBACTAM SODIUM 3 G: 2; 1 INJECTION, POWDER, FOR SOLUTION INTRAMUSCULAR; INTRAVENOUS at 17:35

## 2022-09-19 RX ADMIN — ZONISAMIDE 100 MG: 50 CAPSULE ORAL at 21:55

## 2022-09-19 RX ADMIN — ATORVASTATIN CALCIUM 40 MG: 40 TABLET, FILM COATED ORAL at 21:55

## 2022-09-19 RX ADMIN — DOCUSATE SODIUM 50 MG AND SENNOSIDES 8.6 MG 2 TABLET: 8.6; 5 TABLET, FILM COATED ORAL at 05:03

## 2022-09-19 RX ADMIN — GABAPENTIN 300 MG: 300 CAPSULE ORAL at 17:35

## 2022-09-19 RX ADMIN — LEVETIRACETAM 500 MG: 500 TABLET, FILM COATED ORAL at 05:03

## 2022-09-19 RX ADMIN — AMPICILLIN SODIUM AND SULBACTAM SODIUM 3 G: 2; 1 INJECTION, POWDER, FOR SOLUTION INTRAMUSCULAR; INTRAVENOUS at 05:18

## 2022-09-19 RX ADMIN — LEVETIRACETAM 500 MG: 500 TABLET, FILM COATED ORAL at 17:35

## 2022-09-19 RX ADMIN — AMLODIPINE BESYLATE 10 MG: 10 TABLET ORAL at 05:03

## 2022-09-19 RX ADMIN — THERA TABS 1 TABLET: TAB at 05:03

## 2022-09-19 RX ADMIN — AMPICILLIN SODIUM AND SULBACTAM SODIUM 3 G: 2; 1 INJECTION, POWDER, FOR SOLUTION INTRAMUSCULAR; INTRAVENOUS at 11:19

## 2022-09-19 RX ADMIN — ENOXAPARIN SODIUM 40 MG: 40 INJECTION SUBCUTANEOUS at 17:35

## 2022-09-19 RX ADMIN — INSULIN GLARGINE-YFGN 7 UNITS: 100 INJECTION, SOLUTION SUBCUTANEOUS at 17:36

## 2022-09-19 RX ADMIN — Medication 100 MG: at 05:02

## 2022-09-19 ASSESSMENT — COGNITIVE AND FUNCTIONAL STATUS - GENERAL
SUGGESTED CMS G CODE MODIFIER DAILY ACTIVITY: CK
DRESSING REGULAR LOWER BODY CLOTHING: A LOT
HELP NEEDED FOR BATHING: A LOT
DRESSING REGULAR UPPER BODY CLOTHING: A LITTLE
DAILY ACTIVITIY SCORE: 18
TOILETING: A LITTLE

## 2022-09-19 ASSESSMENT — PAIN DESCRIPTION - PAIN TYPE
TYPE: ACUTE PAIN
TYPE: ACUTE PAIN

## 2022-09-19 NOTE — THERAPY
Missed Therapy     Patient Name: Alec Howell  Age:  82 y.o., Sex:  male  Medical Record #: [id number]  Today's Date: 9/19/2022    Discussed missed therapy with nursing        09/19/22 1600   Interdisciplinary Plan of Care Collaboration   Collaboration Comments Attempted to see patient for PT treatment session. Despite cueing and encouragement patient adamantly refusing to get OOB, pulling off gown and uncooperative.     Nimisha Morgan, PT, DPT, GCS

## 2022-09-19 NOTE — DISCHARGE PLANNING
Case Management Discharge Planning    Admission Date: 9/12/2022  GMLOS: 6.6  ALOS: 7    6-Clicks ADL Score: 18  6-Clicks Mobility Score: 17  PT and/or OT Eval ordered: Yes  Post-acute Referrals Ordered: Yes  Post-acute Choice Obtained: Yes  Has referral(s) been sent to post-acute provider:  Yes    Anticipated Discharge Dispo: Discharge Disposition: Disch to  rehab facility or distinct part unit (62)    Action(s) Taken: Spoke with patient's son, Zackary and explained [facility].  Zackary agreeable to acute rehab.  Consent obtained to send referral to [facility].  Choice form faxed to [facility].  Zackary not currently working and able to assist patient as needed after rehab.    Medically Clear: No    Next Steps: Follow up with [facility].    Barriers to Discharge: Medical clearance, placement acceptance

## 2022-09-19 NOTE — THERAPY
Occupational Therapy  Daily Treatment     Patient Name: Alec Howell  Age:  82 y.o., Sex:  male  Medical Record #: [id number]  Today's Date: 9/19/2022       Precautions: Fall Risk, Swallow Precautions ( See Comments)  Comments: s/p R crani    Assessment    Pt seen for OT tx. Continues to be limited by decreased activity tolerance, balance deficits, inattention and poor safety awareness impacting ability to complete ADLs and ADL transfers independently. Mod A sit > stand from chair d/t lower surface. Amb w/ FWW in room > BR w/ CGA for balance and safety. Min A sit > stand from toilet heavily using grab bars. Pt pleasantly confused and required reorientation throughout session. Will continue to benefit from OT services while in house.     Plan    Continue current treatment plan.    DC Equipment Recommendations: Unable to determine at this time  Discharge Recommendations: Recommend post-acute placement for additional occupational therapy services prior to discharge home       09/19/22 0901   Balance   Sitting Balance (Static) Fair   Sitting Balance (Dynamic) Fair   Standing Balance (Static) Fair -   Standing Balance (Dynamic) Fair -   Weight Shift Sitting Fair   Weight Shift Standing Fair   Bed Mobility    Comments up in chair pre and post session   Activities of Daily Living   Grooming Minimal Assist;Standing   Upper Body Dressing Minimal Assist   Lower Body Dressing Maximal Assist   Toileting Maximal Assist   Functional Mobility   Sit to Stand Moderate Assist   Bed, Chair, Wheelchair Transfer Minimal Assist   Toilet Transfers Minimal Assist   Short Term Goals   Short Term Goal # 1 Pt will complete ADL transfers with supervision   Goal Outcome # 1 Progressing as expected   Short Term Goal # 2 Pt will complete full body dressing with supervision   Goal Outcome # 2 Progressing as expected   Short Term Goal # 3 Pt will complete standing G/H with supervision   Goal Outcome # 3 Progressing as expected    Anticipated Discharge Equipment and Recommendations   DC Equipment Recommendations Unable to determine at this time   Discharge Recommendations Recommend post-acute placement for additional occupational therapy services prior to discharge home

## 2022-09-19 NOTE — CARE PLAN
Problem: Skin Integrity  Goal: Skin integrity is maintained or improved  Outcome: Progressing   The patient is Stable - Low risk of patient condition declining or worsening    Shift Goals  Clinical Goals: remain free of falls  Patient Goals: go outside  Family Goals: fei

## 2022-09-19 NOTE — PROGRESS NOTES
[facility] Medicine Daily Progress Note    Date of Service  9/19/2022    Chief Complaint  Alec Howell is a 82 y.o. male admitted 9/12/2022 with SDH    Hospital Course  81 y/o M with CAD s/p stent, primary HTN, BPH, diabetes type II who presented 9/12/2022 to the ED at [facility] as a transfer from outside facility with a chief complaint of headache, right shoulder and hip pain after falling off of his toilet 2 days prior.  He denied any LOC, chest pain or shortness of breath. He does state that he has had chronic right-sided weakness, as well as decreased sensation to his right side for the past year or so that has not improved or worsened.  Shoulder and hip films at outlying facility were found to be negative for any fractures.  CT of head showed a 2.1 cm subdural hematoma in the right frontal area with a 1 mm right to left midline shift, therefore he was transferred to [facility] for neurosurgical intervention and critical care management.     Neurosurgery evaluated the patient - given the size of hematoma, evacuation was indicated. Pt underwent Right frontal parietal temporal craniotomy, evacuation acute, subacute subdural hematoma and resection subdural membrane on 9/14. He was monitored in ICU post op with q1hr neuro check, symptom management and monitoring of HVAC and EVD. Subdural drain removed 9/17/2022  Hemovac removed 9/16/2022. Patient transferred to neuro floor on 9/17.    He is on nonrebreather mask for pneumocephalus.    Interval Problem Update  Patient seen and examined resting in bed, afebrile. No overnight events   Very drowsy, able to answer simple questions.  His wbc were trending up so was started on abx empirically his CXR showed hypoinflation .    I have discussed this patient's plan of care and discharge plan at IDT rounds today with Case Management, Nursing, Nursing leadership, and other members of the IDT team.    Consultants/Specialty  critical care and  neurosurgery    Code Status  Full Code    Disposition  Patient is not medically cleared for discharge.   Anticipate discharge to to skilled nursing facility.  I have placed the appropriate orders for post-discharge needs.    Review of Systems  Review of Systems   Unable to perform ROS: Mental acuity   All other systems reviewed and are negative.     Physical Exam  Temp:  [37.2 °C (99 °F)-37.7 °C (99.8 °F)] 37.2 °C (99 °F)  Pulse:  [] 97  Resp:  [17-18] 18  BP: (130-150)/(71-92) 139/74  SpO2:  [94 %-97 %] 94 %    Physical Exam  Vitals and nursing note reviewed.   Constitutional:       Appearance: Normal appearance. He is ill-appearing.      Comments: Drowsy, able to answer simple questions   HENT:      Head: Normocephalic and atraumatic.      Comments: Right craniotomy scar C/D/I  Staples in place      Mouth/Throat:      Pharynx: Oropharynx is clear.   Eyes:      Pupils: Pupils are equal, round, and reactive to light.   Neck:      Vascular: No carotid bruit.   Cardiovascular:      Rate and Rhythm: Normal rate and regular rhythm.   Pulmonary:      Effort: Pulmonary effort is normal.      Breath sounds: Rales present.      Comments: On oxygen mask  Abdominal:      General: Abdomen is flat. Bowel sounds are normal. There is no distension.      Palpations: Abdomen is soft. There is no mass.      Tenderness: There is no abdominal tenderness.   Musculoskeletal:         General: Normal range of motion.      Cervical back: Neck supple.   Skin:     General: Skin is warm and dry.   Neurological:      Mental Status: He is alert.      Comments: Drowsy, only able to answer simple questions  Move extremities spontaneously   Unable to complete neuro exam due to somnolence    Psychiatric:      Comments: Unable to assess       Fluids    Intake/Output Summary (Last 24 hours) at 9/19/2022 1429  Last data filed at 9/19/2022 1000  Gross per 24 hour   Intake --   Output 1350 ml   Net -1350 ml       Laboratory  Recent Labs      09/17/22  0455 09/18/22 0442 09/19/22  1039   WBC 14.0* 15.6* 12.9*   RBC 3.18* 3.17* 3.42*   HEMOGLOBIN 9.8* 9.8* 10.5*   HEMATOCRIT 30.8* 29.9* 31.4*   MCV 96.9 94.3 91.8   MCH 30.8 30.9 30.7   MCHC 31.8* 32.8* 33.4*   RDW 43.8 42.6 40.9   PLATELETCT 260 292 225   MPV 11.4 11.2 11.3     Recent Labs     09/17/22  0455 09/18/22 0442 09/19/22  1039   SODIUM 135 135 137   POTASSIUM 4.2 3.9 4.2   CHLORIDE 105 103 103   CO2 22 22 20   GLUCOSE 96 116* 152*   BUN 17 18 17   CREATININE 0.98 0.89 0.86   CALCIUM 8.9 9.0 8.6                   Imaging  DX-CHEST-PORTABLE (1 VIEW)   Final Result      Hypoinflation without other evidence for acute cardiopulmonary disease.      MR-BRAIN-W/O   Final Result      1.  Right subdural hematoma with postsurgical changes.   2.  Multiple chronic lacunar infarcts.   3.  Severe chronic microvascular ischemic disease.   4.  Moderate volume loss.      CT-HEAD W/O   Final Result         1.  Right craniotomy defect and subdural drain again identified.      2.  Residual subdural fluid and hemorrhage again identified which appears similar to prior exam. Pneumocephalus is again noted.      3.  Midline shift to the left side measuring 2 mm is unchanged.      4.  No new intracranial hemorrhage is identified.               CT-HEAD W/O   Final Result      Grossly similar postsurgical changes from right subdural hematoma evacuation with similar residual subdural hematoma and air .      Decreased midline shift, measuring about 2 mm.      No new hemorrhage.            CT-HEAD W/O   Final Result      Postsurgical changes status post right-sided subdural hematoma evacuation with residual subdural hematoma and air and a subdural drain in place.      Multiple chronic bilateral lacunar infarcts.         DX-CHEST-LIMITED (1 VIEW)   Final Result      1.  No acute cardiac or pulmonary abnormalities are identified.      OUTSIDE IMAGES-DX UPPER EXTREMITY, RIGHT   Final Result      [id number] FILM X-RAY    Final Result      OUTSIDE IMAGES-CT HEAD   Final Result           Assessment/Plan  * Subdural hematoma (HCC)- (present on admission)  Assessment & Plan  Right-sided subdural hematoma with mass-effect  S/p right frontal parietal temporal craniotomy with evacuation of subdural hematoma and resection of subdural membrane. EVD placement 9/14  -Serial follow up CT scan has been stable with acute concenrs  -Subdural drain removed 9/17/2022  -Hemovac removed 9/16/2022.   -SBP goal of less than 160.   -on Keppra  -SCDs - DVT PPX started, ok by neurosurgery  -No anticoag for 10 days  -No asa    Maintain eunatremic, euglycemic, normothermic  PT/OT  Physiatry     Mri 9/17 Right subdural hematoma with postsurgical changes. Multiple chronic lacunar infarcts.    Encephalopathy  Assessment & Plan  Multifactorial, sec to SDH with pneumocephalus, delirium, possibly complicated with aspiration pneumonia  CT head 9/17 noted Right craniotomy defect and subdural drain again identified. Residual subdural fluid and hemorrhage again identified which appears similar to prior exam  Start empirical antibiotics  Cont monitoring  Minimize risk of delirium such as avoiding day time napping and promote night time sleep, monitor for constipation, remove lines/tubing that is not needed, avoid early lab draws and vital checks, limit polypharmacy as able, and keep close to the window    Leukocytosis  Assessment & Plan  No fever, but given worsening mental status, concerning aspiration pneumonia  Check CXR, procalcitonin, blood culture  Start empirical abx Unasyn     Coronary artery disease involving native coronary artery of native heart without angina pectoris- (present on admission)  Assessment & Plan  C/w statin  Not on beta blocker or ACEI/ARB at home    Pneumocephalus  Assessment & Plan  Pneumocephalus post right craniotomy with evacuation of subdural hematoma and resection of subdural membrane    CT head postop showed pneumocephalus-we will  continue oxygen augmentation to decrease pulmonary nitrogen levels which will create a nitrogen gradient causing nitrogen pneumocephalus to diffuse into the lungs via blood.    Possibly continue for another 24-48hrs    Severe malnutrition (HCC)- (present on admission)  Assessment & Plan  -Start daily thiamine  -Nutritional consult has been done- follow nutritional recommendations  -Start Multivitamin  -Dietary following    Electrolyte abnormality- (present on admission)  Assessment & Plan  -Will continue to follow daily labs and replace as needed  -multivitamin and thiamine     Primary hypertension- (present on admission)  Assessment & Plan  Goal SBP less than 160  Continue amlodipine, 10 mg daily  IVP Antihypertensives as needed to keep within goal of SBP less than 160    Type 2 diabetes mellitus (HCC)- (present on admission)  Assessment & Plan  -Glycohemoglobin- 8.6  -Sliding scale insulin- adjust as needed  C/w glargine 7 units  -Monitor glucose with FSBS and CMP/BMP       VTE prophylaxis: SCDs/TEDs and enoxaparin ppx    I have performed a physical exam and reviewed and updated ROS and Plan today (9/19/2022). In review of yesterday's note (9/18/2022), there are no changes except as documented above.

## 2022-09-19 NOTE — DISCHARGE PLANNING
Received Choice Form @: 3302  Agency/ Facility Name: [facility]  Referral Sent per Choice Form @: 6603

## 2022-09-19 NOTE — PROGRESS NOTES
Neurosurgery Progress Note    Subjective:  No acute events overnight. Pt sleeping, rouses to voice.   Denies headache or vision changes    Exam:  Drowsy but answering questions appropriately  AO x4. Thought he was in the hospital for a tumro  Following all commands.   MONTEMAYOR weak but equal on exam          BP  Min: 130/71  Max: 150/72  Pulse  Av.3  Min: 67  Max: 110  Resp  Av.8  Min: 17  Max: 18  Temp  Av.4 °C (99.4 °F)  Min: 37.2 °C (99 °F)  Max: 37.7 °C (99.8 °F)  SpO2  Av.5 %  Min: 94 %  Max: 97 %    No data recorded    Recent Labs     22  0455 22   WBC 14.0* 15.6*   RBC 3.18* 3.17*   HEMOGLOBIN 9.8* 9.8*   HEMATOCRIT 30.8* 29.9*   MCV 96.9 94.3   MCH 30.8 30.9   MCHC 31.8* 32.8*   RDW 43.8 42.6   PLATELETCT 260 292   MPV 11.4 11.2       Recent Labs     22  1815 22  0455 22  0442   SODIUM 134* 135 135   POTASSIUM 4.5 4.2 3.9   CHLORIDE 103 105 103   CO2  22   GLUCOSE 234* 96 116*   BUN  18   CREATININE 0.99 0.98 0.89   CALCIUM 9.1 8.9 9.0                     Intake/Output                         22 07 - 2259 22 07 - 22 59      Total  Total                 Intake    Total Intake -- -- -- -- -- --       Output    Urine  --  800 800  --  -- --    Output (mL) (External Urinary Catheter (Condom)) -- 800 800 -- -- --    Stool  --  -- --  --  -- --    Number of Times Stooled 0 x -- 0 x -- -- --    Total Output -- 800 800 -- -- --       Net I/O     -- -800 -800 -- -- --              Intake/Output Summary (Last 24 hours) at 2022 0917  Last data filed at 2022 0400  Gross per 24 hour   Intake --   Output 800 ml   Net -800 ml               ampicillin-sulbactam (UNASYN) IV  3 g Q6HR    enoxaparin (LOVENOX) injection  40 mg DAILY AT 1800    melatonin  5 mg Nightly    levETIRAcetam  500 mg BID    gabapentin  300 mg TID    insulin GLARGINE  7 Units Q EVENING    oxyCODONE  immediate-release  5-10 mg Q4HRS PRN    morphine injection  2-4 mg Q4HRS PRN    thiamine  100 mg DAILY    multivitamin  1 Tablet DAILY    senna-docusate  2 Tablet BID    And    polyethylene glycol/lytes  1 Packet QDAY PRN    And    magnesium hydroxide  30 mL QDAY PRN    And    bisacodyl  10 mg QDAY PRN    Respiratory Therapy Consult   Continuous RT    ondansetron  4 mg Q4HRS PRN    ondansetron  4 mg Q4HRS PRN    amLODIPine  10 mg DAILY    atorvastatin  40 mg Nightly    zonisamide  100 mg QHS    hydrALAZINE  20 mg Q4HRS PRN    labetalol  10-20 mg Q4HRS PRN    insulin regular  2-9 Units 4X/DAY ACHS    And    dextrose bolus  25 g Q15 MIN PRN    acetaminophen  650 mg Q4HRS PRN       Assessment and Plan:  Hospital day #7  POD # 5 Right craniotomy evac of subdural hematoma, resection subdural membrane   Prophylactic anticoagulation: Lovenox 40mg SQ       Start date/time: 9/18/2022    Plan:  Stable neuro exam this morning  CT head 9/17: stable  Maintain Keppra 500 twice daily  PT/OT therapies  Keep scalp incision clean and dry  -shower and wash incision gently  Medical management per Hospitalist  Na 135, continue to monitor and keep eunatremic

## 2022-09-20 LAB
ANION GAP SERPL CALC-SCNC: 15 MMOL/L (ref 7–16)
ANION GAP SERPL CALC-SCNC: 16 MMOL/L (ref 7–16)
APPEARANCE UR: CLEAR
BACTERIA #/AREA URNS HPF: NEGATIVE /HPF
BASOPHILS # BLD AUTO: 1.2 % (ref 0–1.8)
BASOPHILS # BLD: 0.13 K/UL (ref 0–0.12)
BILIRUB UR QL STRIP.AUTO: NEGATIVE
BLOOD CULTURE HOLD CXBCH: NORMAL
BUN SERPL-MCNC: 16 MG/DL (ref 8–22)
BUN SERPL-MCNC: 17 MG/DL (ref 8–22)
CA-I SERPL-SCNC: 1.2 MMOL/L (ref 1.1–1.3)
CALCIUM SERPL-MCNC: 8.9 MG/DL (ref 8.5–10.5)
CALCIUM SERPL-MCNC: 9.1 MG/DL (ref 8.5–10.5)
CHLORIDE SERPL-SCNC: 104 MMOL/L (ref 96–112)
CHLORIDE SERPL-SCNC: 105 MMOL/L (ref 96–112)
CO2 SERPL-SCNC: 20 MMOL/L (ref 20–33)
CO2 SERPL-SCNC: 20 MMOL/L (ref 20–33)
COLOR UR: YELLOW
CREAT SERPL-MCNC: 0.86 MG/DL (ref 0.5–1.4)
CREAT SERPL-MCNC: 0.87 MG/DL (ref 0.5–1.4)
EOSINOPHIL # BLD AUTO: 0.2 K/UL (ref 0–0.51)
EOSINOPHIL NFR BLD: 1.8 % (ref 0–6.9)
EPI CELLS #/AREA URNS HPF: NEGATIVE /HPF
ERYTHROCYTE [DISTWIDTH] IN BLOOD BY AUTOMATED COUNT: 41.3 FL (ref 35.9–50)
ERYTHROCYTE [DISTWIDTH] IN BLOOD BY AUTOMATED COUNT: 41.6 FL (ref 35.9–50)
GFR SERPLBLD CREATININE-BSD FMLA CKD-EPI: 87 ML/MIN/1.73 M 2
GFR SERPLBLD CREATININE-BSD FMLA CKD-EPI: 87 ML/MIN/1.73 M 2
GLUCOSE BLD STRIP.AUTO-MCNC: 113 MG/DL (ref 65–99)
GLUCOSE BLD STRIP.AUTO-MCNC: 148 MG/DL (ref 65–99)
GLUCOSE BLD STRIP.AUTO-MCNC: 156 MG/DL (ref 65–99)
GLUCOSE BLD STRIP.AUTO-MCNC: 186 MG/DL (ref 65–99)
GLUCOSE SERPL-MCNC: 133 MG/DL (ref 65–99)
GLUCOSE SERPL-MCNC: 98 MG/DL (ref 65–99)
GLUCOSE UR STRIP.AUTO-MCNC: NEGATIVE MG/DL
HCT VFR BLD AUTO: 30.8 % (ref 42–52)
HCT VFR BLD AUTO: 31.8 % (ref 42–52)
HGB BLD-MCNC: 10.2 G/DL (ref 14–18)
HGB BLD-MCNC: 10.4 G/DL (ref 14–18)
HYALINE CASTS #/AREA URNS LPF: ABNORMAL /LPF
IMM GRANULOCYTES # BLD AUTO: 0.1 K/UL (ref 0–0.11)
IMM GRANULOCYTES NFR BLD AUTO: 0.9 % (ref 0–0.9)
KETONES UR STRIP.AUTO-MCNC: 15 MG/DL
LEUKOCYTE ESTERASE UR QL STRIP.AUTO: NEGATIVE
LYMPHOCYTES # BLD AUTO: 1.65 K/UL (ref 1–4.8)
LYMPHOCYTES NFR BLD: 14.7 % (ref 22–41)
MAGNESIUM SERPL-MCNC: 1.7 MG/DL (ref 1.5–2.5)
MCH RBC QN AUTO: 30.7 PG (ref 27–33)
MCH RBC QN AUTO: 30.8 PG (ref 27–33)
MCHC RBC AUTO-ENTMCNC: 32.7 G/DL (ref 33.7–35.3)
MCHC RBC AUTO-ENTMCNC: 33.1 G/DL (ref 33.7–35.3)
MCV RBC AUTO: 93.1 FL (ref 81.4–97.8)
MCV RBC AUTO: 93.8 FL (ref 81.4–97.8)
MICRO URNS: ABNORMAL
MONOCYTES # BLD AUTO: 1.11 K/UL (ref 0–0.85)
MONOCYTES NFR BLD AUTO: 9.9 % (ref 0–13.4)
NEUTROPHILS # BLD AUTO: 8.03 K/UL (ref 1.82–7.42)
NEUTROPHILS NFR BLD: 71.5 % (ref 44–72)
NITRITE UR QL STRIP.AUTO: NEGATIVE
NRBC # BLD AUTO: 0 K/UL
NRBC BLD-RTO: 0 /100 WBC
PH UR STRIP.AUTO: 6 [PH] (ref 5–8)
PHOSPHATE SERPL-MCNC: 3 MG/DL (ref 2.5–4.5)
PLATELET # BLD AUTO: 347 K/UL (ref 164–446)
PLATELET # BLD AUTO: 358 K/UL (ref 164–446)
PMV BLD AUTO: 10.6 FL (ref 9–12.9)
PMV BLD AUTO: 10.9 FL (ref 9–12.9)
POTASSIUM SERPL-SCNC: 3.5 MMOL/L (ref 3.6–5.5)
POTASSIUM SERPL-SCNC: 3.6 MMOL/L (ref 3.6–5.5)
PROT UR QL STRIP: 30 MG/DL
RBC # BLD AUTO: 3.31 M/UL (ref 4.7–6.1)
RBC # BLD AUTO: 3.39 M/UL (ref 4.7–6.1)
RBC # URNS HPF: ABNORMAL /HPF
RBC UR QL AUTO: NEGATIVE
SODIUM SERPL-SCNC: 139 MMOL/L (ref 135–145)
SODIUM SERPL-SCNC: 141 MMOL/L (ref 135–145)
SP GR UR STRIP.AUTO: 1.02
UROBILINOGEN UR STRIP.AUTO-MCNC: 1 MG/DL
WBC # BLD AUTO: 11.1 K/UL (ref 4.8–10.8)
WBC # BLD AUTO: 11.2 K/UL (ref 4.8–10.8)
WBC #/AREA URNS HPF: ABNORMAL /HPF

## 2022-09-20 PROCEDURE — 700102 HCHG RX REV CODE 250 W/ 637 OVERRIDE(OP): Performed by: INTERNAL MEDICINE

## 2022-09-20 PROCEDURE — 36415 COLL VENOUS BLD VENIPUNCTURE: CPT

## 2022-09-20 PROCEDURE — A9270 NON-COVERED ITEM OR SERVICE: HCPCS | Performed by: STUDENT IN AN ORGANIZED HEALTH CARE EDUCATION/TRAINING PROGRAM

## 2022-09-20 PROCEDURE — 85025 COMPLETE CBC W/AUTO DIFF WBC: CPT

## 2022-09-20 PROCEDURE — 700102 HCHG RX REV CODE 250 W/ 637 OVERRIDE(OP): Performed by: STUDENT IN AN ORGANIZED HEALTH CARE EDUCATION/TRAINING PROGRAM

## 2022-09-20 PROCEDURE — 770001 HCHG ROOM/CARE - MED/SURG/GYN PRIV*

## 2022-09-20 PROCEDURE — A9270 NON-COVERED ITEM OR SERVICE: HCPCS | Performed by: PHYSICAL MEDICINE & REHABILITATION

## 2022-09-20 PROCEDURE — 97530 THERAPEUTIC ACTIVITIES: CPT

## 2022-09-20 PROCEDURE — 99232 SBSQ HOSP IP/OBS MODERATE 35: CPT | Performed by: STUDENT IN AN ORGANIZED HEALTH CARE EDUCATION/TRAINING PROGRAM

## 2022-09-20 PROCEDURE — 82962 GLUCOSE BLOOD TEST: CPT

## 2022-09-20 PROCEDURE — 87040 BLOOD CULTURE FOR BACTERIA: CPT

## 2022-09-20 PROCEDURE — 81001 URINALYSIS AUTO W/SCOPE: CPT

## 2022-09-20 PROCEDURE — 83735 ASSAY OF MAGNESIUM: CPT

## 2022-09-20 PROCEDURE — A9270 NON-COVERED ITEM OR SERVICE: HCPCS | Performed by: NURSE PRACTITIONER

## 2022-09-20 PROCEDURE — 85027 COMPLETE CBC AUTOMATED: CPT

## 2022-09-20 PROCEDURE — 700102 HCHG RX REV CODE 250 W/ 637 OVERRIDE(OP): Performed by: NURSE PRACTITIONER

## 2022-09-20 PROCEDURE — 700111 HCHG RX REV CODE 636 W/ 250 OVERRIDE (IP): Performed by: STUDENT IN AN ORGANIZED HEALTH CARE EDUCATION/TRAINING PROGRAM

## 2022-09-20 PROCEDURE — 82330 ASSAY OF CALCIUM: CPT

## 2022-09-20 PROCEDURE — 80048 BASIC METABOLIC PNL TOTAL CA: CPT

## 2022-09-20 PROCEDURE — 700102 HCHG RX REV CODE 250 W/ 637 OVERRIDE(OP): Performed by: PHYSICAL MEDICINE & REHABILITATION

## 2022-09-20 PROCEDURE — 84100 ASSAY OF PHOSPHORUS: CPT

## 2022-09-20 PROCEDURE — A9270 NON-COVERED ITEM OR SERVICE: HCPCS | Performed by: INTERNAL MEDICINE

## 2022-09-20 PROCEDURE — 92526 ORAL FUNCTION THERAPY: CPT

## 2022-09-20 PROCEDURE — 700105 HCHG RX REV CODE 258: Performed by: STUDENT IN AN ORGANIZED HEALTH CARE EDUCATION/TRAINING PROGRAM

## 2022-09-20 RX ORDER — POTASSIUM CHLORIDE 20 MEQ/1
40 TABLET, EXTENDED RELEASE ORAL ONCE
Status: COMPLETED | OUTPATIENT
Start: 2022-09-20 | End: 2022-09-20

## 2022-09-20 RX ADMIN — GABAPENTIN 300 MG: 300 CAPSULE ORAL at 17:37

## 2022-09-20 RX ADMIN — ATORVASTATIN CALCIUM 40 MG: 40 TABLET, FILM COATED ORAL at 21:41

## 2022-09-20 RX ADMIN — POTASSIUM CHLORIDE 40 MEQ: 1500 TABLET, EXTENDED RELEASE ORAL at 17:37

## 2022-09-20 RX ADMIN — Medication 100 MG: at 05:21

## 2022-09-20 RX ADMIN — AMPICILLIN SODIUM AND SULBACTAM SODIUM 3 G: 2; 1 INJECTION, POWDER, FOR SOLUTION INTRAMUSCULAR; INTRAVENOUS at 23:35

## 2022-09-20 RX ADMIN — AMPICILLIN SODIUM AND SULBACTAM SODIUM 3 G: 2; 1 INJECTION, POWDER, FOR SOLUTION INTRAMUSCULAR; INTRAVENOUS at 17:37

## 2022-09-20 RX ADMIN — AMLODIPINE BESYLATE 10 MG: 10 TABLET ORAL at 05:21

## 2022-09-20 RX ADMIN — ENOXAPARIN SODIUM 40 MG: 40 INJECTION SUBCUTANEOUS at 17:37

## 2022-09-20 RX ADMIN — THERA TABS 1 TABLET: TAB at 05:21

## 2022-09-20 RX ADMIN — INSULIN GLARGINE-YFGN 7 UNITS: 100 INJECTION, SOLUTION SUBCUTANEOUS at 17:37

## 2022-09-20 RX ADMIN — GABAPENTIN 300 MG: 300 CAPSULE ORAL at 05:21

## 2022-09-20 RX ADMIN — INSULIN HUMAN 2 UNITS: 100 INJECTION, SOLUTION PARENTERAL at 13:17

## 2022-09-20 RX ADMIN — DOCUSATE SODIUM 50 MG AND SENNOSIDES 8.6 MG 2 TABLET: 8.6; 5 TABLET, FILM COATED ORAL at 05:21

## 2022-09-20 RX ADMIN — DOCUSATE SODIUM 50 MG AND SENNOSIDES 8.6 MG 2 TABLET: 8.6; 5 TABLET, FILM COATED ORAL at 17:37

## 2022-09-20 RX ADMIN — INSULIN HUMAN 2 UNITS: 100 INJECTION, SOLUTION PARENTERAL at 21:55

## 2022-09-20 RX ADMIN — AMPICILLIN SODIUM AND SULBACTAM SODIUM 3 G: 2; 1 INJECTION, POWDER, FOR SOLUTION INTRAMUSCULAR; INTRAVENOUS at 13:17

## 2022-09-20 RX ADMIN — LEVETIRACETAM 500 MG: 500 TABLET, FILM COATED ORAL at 17:37

## 2022-09-20 RX ADMIN — LEVETIRACETAM 500 MG: 500 TABLET, FILM COATED ORAL at 05:21

## 2022-09-20 RX ADMIN — GABAPENTIN 300 MG: 300 CAPSULE ORAL at 13:17

## 2022-09-20 RX ADMIN — ZONISAMIDE 100 MG: 50 CAPSULE ORAL at 21:41

## 2022-09-20 RX ADMIN — AMPICILLIN SODIUM AND SULBACTAM SODIUM 3 G: 2; 1 INJECTION, POWDER, FOR SOLUTION INTRAMUSCULAR; INTRAVENOUS at 05:14

## 2022-09-20 RX ADMIN — Medication 5 MG: at 21:41

## 2022-09-20 ASSESSMENT — COGNITIVE AND FUNCTIONAL STATUS - GENERAL
MOBILITY SCORE: 13
TURNING FROM BACK TO SIDE WHILE IN FLAT BAD: A LOT
MOVING FROM LYING ON BACK TO SITTING ON SIDE OF FLAT BED: UNABLE
CLIMB 3 TO 5 STEPS WITH RAILING: A LOT
STANDING UP FROM CHAIR USING ARMS: A LITTLE
MOVING TO AND FROM BED TO CHAIR: A LOT
WALKING IN HOSPITAL ROOM: A LITTLE
SUGGESTED CMS G CODE MODIFIER MOBILITY: CL

## 2022-09-20 ASSESSMENT — PAIN DESCRIPTION - PAIN TYPE
TYPE: ACUTE PAIN
TYPE: ACUTE PAIN

## 2022-09-20 ASSESSMENT — GAIT ASSESSMENTS
ASSISTIVE DEVICE: FRONT WHEEL WALKER
DISTANCE (FEET): 5
GAIT LEVEL OF ASSIST: CONTACT GUARD ASSIST

## 2022-09-20 NOTE — PROGRESS NOTES
Assumed patient care around 1900. Patient is alert and oriented to self. Pt medicated for pain per MAR. Bed in lowest position and locked. Call light within reach and bed alarm is set. Hourly rounding continues.

## 2022-09-20 NOTE — THERAPY
Speech Language Pathology  Daily Treatment     Patient Name: Alec Howell  Age:  82 y.o., Sex:  male  Medical Record #: [id number]  Today's Date: 9/20/2022     Precautions  Precautions: Fall Risk, Swallow Precautions ( See Comments)  Comments: s/p R crani    Assessment    Patient was seen for dysphagia tx this morning with breakfast meal of soft/bite sized solids and thin liquids via straw/cup. Pt was alert, confused, convinced his dog was here and needed to be bed, begging SLP to find and feed his dog. Pt was not able to be redirected initially, refusing to eat his breakfast, asking SLP to call his son. Pt's son stated he would be visiting in about an hour. Pt calmed down a little after this information but continued to refuse to eat and worry about his dog. Despite redirection, distraction and education, pt declined solid food. He reports he still has some jaw pain but the soft solids have been fine. He just doesn't feel like he has an appetite. He did agree to consume 8 oz glucerna, refrasia and 4 oz diet cranberry juice. Overt cough response x2 occurred with serial straw sips. No s/sx of aspiration occurred with single cup or straw sips. Pt followed min verbal cues to reduce sip size/rate with no further s/sx of aspiration appreciated.     Recommendations:  Continue soft/bite sized solids and thin liquids  Pills whole w/ thins  Encourage PO intake, small single cup/straw sips  Consider ordering a cognitive-linguistic evaluation due to brain injury and ongoing confusion    Plan    Continue current treatment plan.    Discharge Recommendations: Recommend post-acute placement for additional speech therapy services prior to discharge home       Objective       09/20/22 0934   Dysphagia    Diet / Liquid Recommendation Soft & Bite-Sized (6) - (Dysphagia III);Thin (0)   Nutritional Liquid Intake Rating Scale Non thickened beverages   Nutritional Food Intake Rating Scale Total oral diet with multiple  "consistencies without special preparation but with specific food limitations   Patient / Family Goals   Patient / Family Goal #1 \"I don't want to choke\"   Goal #1 Outcome Progressing as expected   Short Term Goals   Short Term Goal # 1 Pt will consume diet of SB6/TN0 w/ no overt s/sx of aspiration or exerbation of pain.   Goal Outcome # 1 Progressing slower than expected     "

## 2022-09-20 NOTE — PROGRESS NOTES
[facility] Medicine Daily Progress Note    Date of Service  9/20/2022    Chief Complaint  Alec Howell is a 82 y.o. male admitted 9/12/2022 with SDH    Hospital Course  81 y/o M with CAD s/p stent, primary HTN, BPH, diabetes type II who presented 9/12/2022 to the ED at [facility] as a transfer from outside facility with a chief complaint of headache, right shoulder and hip pain after falling off of his toilet 2 days prior.  He denied any LOC, chest pain or shortness of breath. He does state that he has had chronic right-sided weakness, as well as decreased sensation to his right side for the past year or so that has not improved or worsened.  Shoulder and hip films at outlying facility were found to be negative for any fractures.  CT of head showed a 2.1 cm subdural hematoma in the right frontal area with a 1 mm right to left midline shift, therefore he was transferred to [facility] for neurosurgical intervention and critical care management.     Neurosurgery evaluated the patient - given the size of hematoma, evacuation was indicated. Pt underwent Right frontal parietal temporal craniotomy, evacuation acute, subacute subdural hematoma and resection subdural membrane on 9/14. He was monitored in ICU post op with q1hr neuro check, symptom management and monitoring of HVAC and EVD. Subdural drain removed 9/17/2022  Hemovac removed 9/16/2022. Patient transferred to neuro floor on 9/17.    He is on nonrebreather mask for pneumocephalus.    Interval Problem Update  Patient seen and examined resting in bed, afebrile. No overnight events   Very drowsy, able to answer simple questions.  His wbc were trending up so was started on abx empirically his CXR showed hypoinflation .  9/20: No overnight events.  Cleared by neurosurgery for discharge.  Vitals are stable.    I have discussed this patient's plan of care and discharge plan at IDT rounds today with Case Management, Nursing, Nursing leadership, and other  members of the IDT team.    Consultants/Specialty  critical care and neurosurgery    Code Status  Full Code    Disposition  Patient is medically cleared for discharge.   Anticipate discharge to to skilled nursing facility.  I have placed the appropriate orders for post-discharge needs.    Review of Systems  Review of Systems   Unable to perform ROS: Mental acuity   All other systems reviewed and are negative.     Physical Exam  Temp:  [36.9 °C (98.4 °F)-37.6 °C (99.7 °F)] 36.9 °C (98.4 °F)  Pulse:  [104-111] 104  Resp:  [16-18] 16  BP: (142-169)/(62-79) 144/75  SpO2:  [95 %-97 %] 97 %    Physical Exam  Vitals and nursing note reviewed.   Constitutional:       General: He is not in acute distress.     Appearance: Normal appearance.      Comments: Drowsy, able to answer simple questions   HENT:      Head: Normocephalic and atraumatic.      Comments: Right craniotomy scar C/D/I  Staples in place      Mouth/Throat:      Pharynx: Oropharynx is clear.   Eyes:      General: No scleral icterus.        Right eye: No discharge.         Left eye: No discharge.   Neck:      Vascular: No carotid bruit.   Cardiovascular:      Rate and Rhythm: Normal rate and regular rhythm.      Heart sounds: Normal heart sounds.   Pulmonary:      Effort: Pulmonary effort is normal.      Breath sounds: Rales present.      Comments: On oxygen mask  Abdominal:      General: Abdomen is flat. Bowel sounds are normal. There is no distension.      Palpations: Abdomen is soft. There is no mass.      Tenderness: There is no abdominal tenderness.   Musculoskeletal:         General: Normal range of motion.      Cervical back: Neck supple.   Skin:     General: Skin is warm and dry.   Neurological:      Mental Status: He is alert.      Comments: Drowsy, only able to answer simple questions  Move extremities spontaneously   Unable to complete neuro exam due to somnolence    Psychiatric:      Comments: Unable to assess       Fluids    Intake/Output Summary  (Last 24 hours) at 9/20/2022 1514  Last data filed at 9/20/2022 0338  Gross per 24 hour   Intake --   Output 850 ml   Net -850 ml       Laboratory  Recent Labs     09/19/22  1039 09/20/22  0111 09/20/22  0821   WBC 12.9* 11.1* 11.2*   RBC 3.42* 3.31* 3.39*   HEMOGLOBIN 10.5* 10.2* 10.4*   HEMATOCRIT 31.4* 30.8* 31.8*   MCV 91.8 93.1 93.8   MCH 30.7 30.8 30.7   MCHC 33.4* 33.1* 32.7*   RDW 40.9 41.6 41.3   PLATELETCT 225 347 358   MPV 11.3 10.9 10.6     Recent Labs     09/19/22  1039 09/20/22  0111 09/20/22  0821   SODIUM 137 141 139   POTASSIUM 4.2 3.6 3.5*   CHLORIDE 103 105 104   CO2 20 20 20   GLUCOSE 152* 133* 98   BUN 17 17 16   CREATININE 0.86 0.87 0.86   CALCIUM 8.6 8.9 9.1                   Imaging  DX-CHEST-PORTABLE (1 VIEW)   Final Result      Hypoinflation without other evidence for acute cardiopulmonary disease.      MR-BRAIN-W/O   Final Result      1.  Right subdural hematoma with postsurgical changes.   2.  Multiple chronic lacunar infarcts.   3.  Severe chronic microvascular ischemic disease.   4.  Moderate volume loss.      CT-HEAD W/O   Final Result         1.  Right craniotomy defect and subdural drain again identified.      2.  Residual subdural fluid and hemorrhage again identified which appears similar to prior exam. Pneumocephalus is again noted.      3.  Midline shift to the left side measuring 2 mm is unchanged.      4.  No new intracranial hemorrhage is identified.               CT-HEAD W/O   Final Result      Grossly similar postsurgical changes from right subdural hematoma evacuation with similar residual subdural hematoma and air .      Decreased midline shift, measuring about 2 mm.      No new hemorrhage.            CT-HEAD W/O   Final Result      Postsurgical changes status post right-sided subdural hematoma evacuation with residual subdural hematoma and air and a subdural drain in place.      Multiple chronic bilateral lacunar infarcts.         DX-CHEST-LIMITED (1 VIEW)   Final Result       1.  No acute cardiac or pulmonary abnormalities are identified.      OUTSIDE IMAGES-DX UPPER EXTREMITY, RIGHT   Final Result      [id number] FILM X-RAY   Final Result      OUTSIDE IMAGES-CT HEAD   Final Result           Assessment/Plan  * Subdural hematoma (HCC)- (present on admission)  Assessment & Plan  Right-sided subdural hematoma with mass-effect  S/p right frontal parietal temporal craniotomy with evacuation of subdural hematoma and resection of subdural membrane. EVD placement 9/14  -Serial follow up CT scan has been stable with acute concenrs  -Subdural drain removed 9/17/2022  -Hemovac removed 9/16/2022.   -SBP goal of less than 160.   -on Keppra  -SCDs - DVT PPX started, ok by neurosurgery  -No anticoag for 10 days  -No asa    Maintain eunatremic, euglycemic, normothermic  PT/OT  Physiatry     Mri 9/17 Right subdural hematoma with postsurgical changes. Multiple chronic lacunar infarcts.  Remove staples on 9/28    Encephalopathy  Assessment & Plan  Multifactorial, sec to SDH with pneumocephalus, delirium, possibly complicated with aspiration pneumonia  CT head 9/17 noted Right craniotomy defect and subdural drain again identified. Residual subdural fluid and hemorrhage again identified which appears similar to prior exam  Start empirical antibiotics  Cont monitoring  Minimize risk of delirium such as avoiding day time napping and promote night time sleep, monitor for constipation, remove lines/tubing that is not needed, avoid early lab draws and vital checks, limit polypharmacy as able, and keep close to the window    Leukocytosis  Assessment & Plan  No fever, but given worsening mental status, concerning aspiration pneumonia  Check CXR, procalcitonin, blood culture  Start empirical abx Unasyn     Coronary artery disease involving native coronary artery of native heart without angina pectoris- (present on admission)  Assessment & Plan  C/w statin  Not on beta blocker or ACEI/ARB at  home    Pneumocephalus  Assessment & Plan  Pneumocephalus post right craniotomy with evacuation of subdural hematoma and resection of subdural membrane    CT head postop showed pneumocephalus-we will continue oxygen augmentation to decrease pulmonary nitrogen levels which will create a nitrogen gradient causing nitrogen pneumocephalus to diffuse into the lungs via blood.    Possibly continue for another 24-48hrs    Severe malnutrition (HCC)- (present on admission)  Assessment & Plan  -Start daily thiamine  -Nutritional consult has been done- follow nutritional recommendations  -Start Multivitamin  -Dietary following    Electrolyte abnormality- (present on admission)  Assessment & Plan  -Will continue to follow daily labs and replace as needed  -multivitamin and thiamine     Primary hypertension- (present on admission)  Assessment & Plan  Goal SBP less than 160  Continue amlodipine, 10 mg daily  IVP Antihypertensives as needed to keep within goal of SBP less than 160    Type 2 diabetes mellitus (HCC)- (present on admission)  Assessment & Plan  -Glycohemoglobin- 8.6  -Sliding scale insulin- adjust as needed  C/w glargine 7 units  -Monitor glucose with FSBS and CMP/BMP       VTE prophylaxis: SCDs/TEDs and enoxaparin ppx    I have performed a physical exam and reviewed and updated ROS and Plan today (9/20/2022). In review of yesterday's note (9/19/2022), there are no changes except as documented above.

## 2022-09-20 NOTE — PROGRESS NOTES
Neurosurgery Progress Note    Subjective:  No acute events overnight. Pt awake.   Denies headache or vision changes. States his head feels numb.  Not interested in eating breakfast.    Exam:  AOx3. Not oriented to situation.  PERRL 2mm. Conjugate. No nystagmus  No facial droop. Tongue midline  No drift. Finger-nose slow but accurate  MONTEMAYOR. Slowed response to direction but following all commands.   Strength 4/5 BUE, BLE. Weak, equal bilaterally  Incision EVITA: staples intact. No redness, swelling, drainage.   [place]. +BM 2022        BP  Min: 142/78  Max: 169/62  Pulse  Av.8  Min: 104  Max: 111  Resp  Av.8  Min: 16  Max: 18  Temp  Av.3 °C (99.2 °F)  Min: 36.9 °C (98.4 °F)  Max: 37.6 °C (99.7 °F)  SpO2  Av %  Min: 95 %  Max: 97 %    No data recorded    Recent Labs     22  1039 22  0111   WBC 15.6* 12.9* 11.1*   RBC 3.17* 3.42* 3.31*   HEMOGLOBIN 9.8* 10.5* 10.2*   HEMATOCRIT 29.9* 31.4* 30.8*   MCV 94.3 91.8 93.1   MCH 30.9 30.7 30.8   MCHC 32.8* 33.4* 33.1*   RDW 42.6 40.9 41.6   PLATELETCT 292 225 347   MPV 11.2 11.3 10.9       Recent Labs     222 22  1039 22  0111   SODIUM 135 137 141   POTASSIUM 3.9 4.2 3.6   CHLORIDE 103 103 105   CO2 22 20 20   GLUCOSE 116* 152* 133*   BUN 18 17 17   CREATININE 0.89 0.86 0.87   CALCIUM 9.0 8.6 8.9                     Intake/Output                         22 07 - 22 0659 22 07 - 22 0659     1900-0659 Total  Total                 Intake    P.O.  480  -- 480  --  -- --    P.O. 480 -- 480 -- -- --    Total Intake 480 -- 480 -- -- --       Output    Urine  1000  400 1400  --  -- --    Number of Times Voided 1 x -- 1 x -- -- --    Number of Times Incontinent of Urine 1 x -- 1 x -- -- --    Urine Void (mL) 450 400 850 -- -- --    Output (mL) (External Urinary Catheter (Condom)) 550 -- 550 -- -- --    Total Output 2165 711 7121 -- -- --       Net I/O     -520  -400 -920 -- -- --              Intake/Output Summary (Last 24 hours) at 9/20/2022 0822  Last data filed at 9/20/2022 0338  Gross per 24 hour   Intake 480 ml   Output 1400 ml   Net -920 ml               ampicillin-sulbactam (UNASYN) IV  3 g Q6HR    enoxaparin (LOVENOX) injection  40 mg DAILY AT 1800    melatonin  5 mg Nightly    levETIRAcetam  500 mg BID    gabapentin  300 mg TID    insulin GLARGINE  7 Units Q EVENING    oxyCODONE immediate-release  5-10 mg Q4HRS PRN    morphine injection  2-4 mg Q4HRS PRN    thiamine  100 mg DAILY    multivitamin  1 Tablet DAILY    senna-docusate  2 Tablet BID    And    polyethylene glycol/lytes  1 Packet QDAY PRN    And    magnesium hydroxide  30 mL QDAY PRN    And    bisacodyl  10 mg QDAY PRN    Respiratory Therapy Consult   Continuous RT    ondansetron  4 mg Q4HRS PRN    ondansetron  4 mg Q4HRS PRN    amLODIPine  10 mg DAILY    atorvastatin  40 mg Nightly    zonisamide  100 mg QHS    hydrALAZINE  20 mg Q4HRS PRN    labetalol  10-20 mg Q4HRS PRN    insulin regular  2-9 Units 4X/DAY ACHS    And    dextrose bolus  25 g Q15 MIN PRN    acetaminophen  650 mg Q4HRS PRN       Assessment and Plan:  Hospital day #8  POD #6 Right craniotomy evac of subdural hematoma, resection subdural membrane   Prophylactic anticoagulation: Lovenox 40mg SQ       Start date/time: 9/18/2022    Plan:  Stable neuro exam. Improved mentation and alertness  CT head 9/17: stable  Maintain Keppra 500 twice daily  PT/OT therapies while inpt  Keep scalp incision clean and dry  -shower and wash incision gently  Medical management per Hospitalist  Na 141, continue to monitor and keep eunatremic    Rehab acceptance pending.   Clear from NSG pov to go to rehab once accepted  Keep scalp incision clean and dry. Shower, wash with soap/water, pat dry, leave open to air  Staple removal POD #14, 9/28/2022  Our office will schedule follow up with patient in our clinic

## 2022-09-20 NOTE — CARE PLAN
The patient is Stable - Low risk of patient condition declining or worsening    Shift Goals  Clinical Goals: q4h neuro checks, safety  Patient Goals: go home  Family Goals: EMMANUEL    Progress made toward(s) clinical / shift goals:       Problem: Skin Integrity  Goal: Skin integrity is maintained or improved  Outcome: Progressing   Pillows are being used for support. Pt turns self from side to side in bed. Barrier paste and containment devices are being used.     Problem: Fall Risk  Goal: Patient will remain free from falls  Outcome: Progressing   Pt's bed alarm is on. Bed is locked and in the lowest position. Call light within reach. Pt needs reinforcement of use of call light. Pt does not call appropriately at this time.     Problem: Pain - Standard  Goal: Alleviation of pain or a reduction in pain to the patient’s comfort goal  Outcome: Progressing   Pt being medicated per MAR for pain. Pharmacological and non pharmacological interventions are being used.     Patient is not progressing towards the following goals:      Problem: Knowledge Deficit - Standard  Goal: Patient and family/care givers will demonstrate understanding of plan of care, disease process/condition, diagnostic tests and medications  Outcome: Not Progressing   Pt is alert and oriented to self at this time, and is confused in conversation. Pt is unable to verbalize an understanding of the plan of care at this time.

## 2022-09-21 LAB
ANION GAP SERPL CALC-SCNC: 9 MMOL/L (ref 7–16)
BACTERIA BLD CULT: ABNORMAL
BACTERIA BLD CULT: ABNORMAL
BASOPHILS # BLD AUTO: 1 % (ref 0–1.8)
BASOPHILS # BLD: 0.1 K/UL (ref 0–0.12)
BUN SERPL-MCNC: 18 MG/DL (ref 8–22)
CA-I SERPL-SCNC: 1.2 MMOL/L (ref 1.1–1.3)
CALCIUM SERPL-MCNC: 8.8 MG/DL (ref 8.5–10.5)
CHLORIDE SERPL-SCNC: 106 MMOL/L (ref 96–112)
CO2 SERPL-SCNC: 24 MMOL/L (ref 20–33)
CREAT SERPL-MCNC: 0.89 MG/DL (ref 0.5–1.4)
EOSINOPHIL # BLD AUTO: 0.26 K/UL (ref 0–0.51)
EOSINOPHIL NFR BLD: 2.6 % (ref 0–6.9)
ERYTHROCYTE [DISTWIDTH] IN BLOOD BY AUTOMATED COUNT: 41.1 FL (ref 35.9–50)
GFR SERPLBLD CREATININE-BSD FMLA CKD-EPI: 86 ML/MIN/1.73 M 2
GLUCOSE BLD STRIP.AUTO-MCNC: 113 MG/DL (ref 65–99)
GLUCOSE BLD STRIP.AUTO-MCNC: 160 MG/DL (ref 65–99)
GLUCOSE BLD STRIP.AUTO-MCNC: 161 MG/DL (ref 65–99)
GLUCOSE BLD STRIP.AUTO-MCNC: 167 MG/DL (ref 65–99)
GLUCOSE SERPL-MCNC: 105 MG/DL (ref 65–99)
HCT VFR BLD AUTO: 30.4 % (ref 42–52)
HGB BLD-MCNC: 10 G/DL (ref 14–18)
IMM GRANULOCYTES # BLD AUTO: 0.08 K/UL (ref 0–0.11)
IMM GRANULOCYTES NFR BLD AUTO: 0.8 % (ref 0–0.9)
LYMPHOCYTES # BLD AUTO: 1.82 K/UL (ref 1–4.8)
LYMPHOCYTES NFR BLD: 17.9 % (ref 22–41)
MAGNESIUM SERPL-MCNC: 1.7 MG/DL (ref 1.5–2.5)
MCH RBC QN AUTO: 30.3 PG (ref 27–33)
MCHC RBC AUTO-ENTMCNC: 32.9 G/DL (ref 33.7–35.3)
MCV RBC AUTO: 92.1 FL (ref 81.4–97.8)
MONOCYTES # BLD AUTO: 1.11 K/UL (ref 0–0.85)
MONOCYTES NFR BLD AUTO: 10.9 % (ref 0–13.4)
NEUTROPHILS # BLD AUTO: 6.77 K/UL (ref 1.82–7.42)
NEUTROPHILS NFR BLD: 66.8 % (ref 44–72)
NRBC # BLD AUTO: 0 K/UL
NRBC BLD-RTO: 0 /100 WBC
PHOSPHATE SERPL-MCNC: 2.5 MG/DL (ref 2.5–4.5)
PLATELET # BLD AUTO: 392 K/UL (ref 164–446)
PMV BLD AUTO: 10 FL (ref 9–12.9)
POTASSIUM SERPL-SCNC: 3.7 MMOL/L (ref 3.6–5.5)
RBC # BLD AUTO: 3.3 M/UL (ref 4.7–6.1)
SIGNIFICANT IND 70042: ABNORMAL
SITE SITE: ABNORMAL
SODIUM SERPL-SCNC: 139 MMOL/L (ref 135–145)
SOURCE SOURCE: ABNORMAL
WBC # BLD AUTO: 10.1 K/UL (ref 4.8–10.8)

## 2022-09-21 PROCEDURE — A9270 NON-COVERED ITEM OR SERVICE: HCPCS | Performed by: STUDENT IN AN ORGANIZED HEALTH CARE EDUCATION/TRAINING PROGRAM

## 2022-09-21 PROCEDURE — A9270 NON-COVERED ITEM OR SERVICE: HCPCS | Performed by: NURSE PRACTITIONER

## 2022-09-21 PROCEDURE — 99232 SBSQ HOSP IP/OBS MODERATE 35: CPT | Performed by: STUDENT IN AN ORGANIZED HEALTH CARE EDUCATION/TRAINING PROGRAM

## 2022-09-21 PROCEDURE — 82962 GLUCOSE BLOOD TEST: CPT | Mod: 91

## 2022-09-21 PROCEDURE — 700105 HCHG RX REV CODE 258: Performed by: STUDENT IN AN ORGANIZED HEALTH CARE EDUCATION/TRAINING PROGRAM

## 2022-09-21 PROCEDURE — 80048 BASIC METABOLIC PNL TOTAL CA: CPT

## 2022-09-21 PROCEDURE — 36415 COLL VENOUS BLD VENIPUNCTURE: CPT

## 2022-09-21 PROCEDURE — 700102 HCHG RX REV CODE 250 W/ 637 OVERRIDE(OP): Performed by: STUDENT IN AN ORGANIZED HEALTH CARE EDUCATION/TRAINING PROGRAM

## 2022-09-21 PROCEDURE — 770001 HCHG ROOM/CARE - MED/SURG/GYN PRIV*

## 2022-09-21 PROCEDURE — A9270 NON-COVERED ITEM OR SERVICE: HCPCS | Performed by: INTERNAL MEDICINE

## 2022-09-21 PROCEDURE — 84100 ASSAY OF PHOSPHORUS: CPT

## 2022-09-21 PROCEDURE — 85025 COMPLETE CBC W/AUTO DIFF WBC: CPT

## 2022-09-21 PROCEDURE — 700102 HCHG RX REV CODE 250 W/ 637 OVERRIDE(OP): Performed by: NURSE PRACTITIONER

## 2022-09-21 PROCEDURE — 700102 HCHG RX REV CODE 250 W/ 637 OVERRIDE(OP): Performed by: INTERNAL MEDICINE

## 2022-09-21 PROCEDURE — A9270 NON-COVERED ITEM OR SERVICE: HCPCS | Performed by: PHYSICAL MEDICINE & REHABILITATION

## 2022-09-21 PROCEDURE — 700102 HCHG RX REV CODE 250 W/ 637 OVERRIDE(OP): Performed by: PHYSICAL MEDICINE & REHABILITATION

## 2022-09-21 PROCEDURE — 82330 ASSAY OF CALCIUM: CPT

## 2022-09-21 PROCEDURE — 700111 HCHG RX REV CODE 636 W/ 250 OVERRIDE (IP): Performed by: STUDENT IN AN ORGANIZED HEALTH CARE EDUCATION/TRAINING PROGRAM

## 2022-09-21 PROCEDURE — 97530 THERAPEUTIC ACTIVITIES: CPT

## 2022-09-21 PROCEDURE — 83735 ASSAY OF MAGNESIUM: CPT

## 2022-09-21 RX ADMIN — GABAPENTIN 300 MG: 300 CAPSULE ORAL at 17:33

## 2022-09-21 RX ADMIN — INSULIN GLARGINE-YFGN 7 UNITS: 100 INJECTION, SOLUTION SUBCUTANEOUS at 17:33

## 2022-09-21 RX ADMIN — POLYETHYLENE GLYCOL 3350 1 PACKET: 17 POWDER, FOR SOLUTION ORAL at 04:37

## 2022-09-21 RX ADMIN — AMLODIPINE BESYLATE 10 MG: 10 TABLET ORAL at 04:36

## 2022-09-21 RX ADMIN — ATORVASTATIN CALCIUM 40 MG: 40 TABLET, FILM COATED ORAL at 21:27

## 2022-09-21 RX ADMIN — Medication 100 MG: at 04:37

## 2022-09-21 RX ADMIN — AMPICILLIN SODIUM AND SULBACTAM SODIUM 3 G: 2; 1 INJECTION, POWDER, FOR SOLUTION INTRAMUSCULAR; INTRAVENOUS at 12:00

## 2022-09-21 RX ADMIN — DOCUSATE SODIUM 50 MG AND SENNOSIDES 8.6 MG 2 TABLET: 8.6; 5 TABLET, FILM COATED ORAL at 04:37

## 2022-09-21 RX ADMIN — AMPICILLIN SODIUM AND SULBACTAM SODIUM 3 G: 2; 1 INJECTION, POWDER, FOR SOLUTION INTRAMUSCULAR; INTRAVENOUS at 22:29

## 2022-09-21 RX ADMIN — GABAPENTIN 300 MG: 300 CAPSULE ORAL at 04:36

## 2022-09-21 RX ADMIN — LEVETIRACETAM 500 MG: 500 TABLET, FILM COATED ORAL at 04:37

## 2022-09-21 RX ADMIN — INSULIN HUMAN 2 UNITS: 100 INJECTION, SOLUTION PARENTERAL at 21:27

## 2022-09-21 RX ADMIN — AMPICILLIN SODIUM AND SULBACTAM SODIUM 3 G: 2; 1 INJECTION, POWDER, FOR SOLUTION INTRAMUSCULAR; INTRAVENOUS at 17:34

## 2022-09-21 RX ADMIN — GABAPENTIN 300 MG: 300 CAPSULE ORAL at 12:00

## 2022-09-21 RX ADMIN — Medication 5 MG: at 21:27

## 2022-09-21 RX ADMIN — ACETAMINOPHEN 650 MG: 325 TABLET, FILM COATED ORAL at 12:31

## 2022-09-21 RX ADMIN — LEVETIRACETAM 500 MG: 500 TABLET, FILM COATED ORAL at 17:33

## 2022-09-21 RX ADMIN — ZONISAMIDE 100 MG: 50 CAPSULE ORAL at 21:27

## 2022-09-21 RX ADMIN — INSULIN HUMAN 2 UNITS: 100 INJECTION, SOLUTION PARENTERAL at 17:33

## 2022-09-21 RX ADMIN — INSULIN HUMAN 2 UNITS: 100 INJECTION, SOLUTION PARENTERAL at 12:00

## 2022-09-21 RX ADMIN — AMPICILLIN SODIUM AND SULBACTAM SODIUM 3 G: 2; 1 INJECTION, POWDER, FOR SOLUTION INTRAMUSCULAR; INTRAVENOUS at 05:11

## 2022-09-21 RX ADMIN — ENOXAPARIN SODIUM 40 MG: 40 INJECTION SUBCUTANEOUS at 17:33

## 2022-09-21 ASSESSMENT — PAIN DESCRIPTION - PAIN TYPE
TYPE: ACUTE PAIN

## 2022-09-21 ASSESSMENT — GAIT ASSESSMENTS
GAIT LEVEL OF ASSIST: CONTACT GUARD ASSIST
ASSISTIVE DEVICE: FRONT WHEEL WALKER
DEVIATION: SHUFFLED GAIT
DISTANCE (FEET): 4

## 2022-09-21 ASSESSMENT — COGNITIVE AND FUNCTIONAL STATUS - GENERAL
MOBILITY SCORE: 14
STANDING UP FROM CHAIR USING ARMS: A LITTLE
MOVING FROM LYING ON BACK TO SITTING ON SIDE OF FLAT BED: A LOT
WALKING IN HOSPITAL ROOM: A LITTLE
TURNING FROM BACK TO SIDE WHILE IN FLAT BAD: A LOT
MOVING TO AND FROM BED TO CHAIR: A LOT
CLIMB 3 TO 5 STEPS WITH RAILING: A LOT
SUGGESTED CMS G CODE MODIFIER MOBILITY: CL

## 2022-09-21 ASSESSMENT — ENCOUNTER SYMPTOMS
FEVER: 0
ABDOMINAL PAIN: 0
CHILLS: 0
SHORTNESS OF BREATH: 0

## 2022-09-21 NOTE — CARE PLAN
The patient is Watcher - Medium risk of patient condition declining or worsening    Shift Goals  Clinical Goals: stable neuro exam  Patient Goals: rest  Family Goals: fei    Progress made toward(s) clinical / shift goals:    Problem: Pain - Standard  Goal: Alleviation of pain or a reduction in pain to the patient’s comfort goal  Outcome: Progressing  Declines      Problem: Neuro Status  Goal: Neuro status will remain stable or improve  Outcome: Progressing  Remains unchanged will continue to monitor       Patient is not progressing towards the following goals:

## 2022-09-21 NOTE — PROGRESS NOTES
Hospital Medicine Daily Progress Note    Date of Service  9/21/2022    Chief Complaint  Alec Howell is a 82 y.o. male admitted 9/12/2022 with SDH    Hospital Course  83 y/o M with CAD s/p stent, primary HTN, BPH, diabetes type II who presented 9/12/2022 to the ED at [facility] as a transfer from outside facility with a chief complaint of headache, right shoulder and hip pain after falling off of his toilet 2 days prior.  He denied any LOC, chest pain or shortness of breath. He does state that he has had chronic right-sided weakness, as well as decreased sensation to his right side for the past year or so that has not improved or worsened.  Shoulder and hip films at outlying facility were found to be negative for any fractures.  CT of head showed a 2.1 cm subdural hematoma in the right frontal area with a 1 mm right to left midline shift, therefore he was transferred to [facility] for neurosurgical intervention and critical care management.     Neurosurgery evaluated the patient - given the size of hematoma, evacuation was indicated. Pt underwent Right frontal parietal temporal craniotomy, evacuation acute, subacute subdural hematoma and resection subdural membrane on 9/14. He was monitored in ICU post op with q1hr neuro check, symptom management and monitoring of HVAC and EVD. Subdural drain removed 9/17/2022  Hemovac removed 9/16/2022. Patient transferred to neuro floor on 9/17.    He is on nonrebreather mask for pneumocephalus.    Interval Problem Update  Patient seen and examined resting in bed, afebrile. No overnight events   Very drowsy, able to answer simple questions.  His wbc were trending up so was started on abx empirically his CXR showed hypoinflation .  9/20: No overnight events.  Cleared by neurosurgery for discharge.  Vitals are stable.  9/21: Patient continues to report decreased appetite when eating solids but denies any difficulty swallowing.  We will include the Glucerna to 2  per meal.  Awaiting placement.    I have discussed this patient's plan of care and discharge plan at IDT rounds today with Case Management, Nursing, Nursing leadership, and other members of the IDT team.    Consultants/Specialty  critical care and neurosurgery    Code Status  Full Code    Disposition  Patient is medically cleared for discharge.   Anticipate discharge to to skilled nursing facility.  I have placed the appropriate orders for post-discharge needs.    Review of Systems  Review of Systems   Constitutional:  Negative for chills and fever.   Respiratory:  Negative for shortness of breath.    Cardiovascular:  Negative for chest pain.   Gastrointestinal:  Negative for abdominal pain.   All other systems reviewed and are negative.     Physical Exam  Temp:  [36.1 °C (97 °F)-37.4 °C (99.3 °F)] 36.8 °C (98.2 °F)  Pulse:  [] 94  Resp:  [16-18] 17  BP: (135-160)/(72-81) 141/81  SpO2:  [96 %-98 %] 96 %    Physical Exam  Vitals and nursing note reviewed. Exam conducted with a chaperone present.   Constitutional:       General: He is not in acute distress.     Appearance: Normal appearance.      Comments: Drowsy, able to answer simple questions   HENT:      Head:      Comments: Right craniotomy scar C/D/I  Staples in place      Mouth/Throat:      Pharynx: Oropharynx is clear.   Eyes:      General: No scleral icterus.        Right eye: No discharge.         Left eye: No discharge.   Neck:      Vascular: No carotid bruit.   Cardiovascular:      Rate and Rhythm: Normal rate and regular rhythm.      Heart sounds: Normal heart sounds.   Pulmonary:      Effort: Pulmonary effort is normal.      Breath sounds: Rales present.      Comments: On oxygen mask  Abdominal:      General: Abdomen is flat. Bowel sounds are normal. There is no distension.      Palpations: Abdomen is soft. There is no mass.      Tenderness: There is no abdominal tenderness.   Musculoskeletal:         General: Normal range of motion.      Cervical  back: Neck supple.      Right lower leg: No edema.      Left lower leg: No edema.   Skin:     General: Skin is warm and dry.   Neurological:      Mental Status: He is alert.      Comments: Move extremities spontaneously    Psychiatric:      Comments: Unable to assess       Fluids  No intake or output data in the 24 hours ending 09/21/22 1237      Laboratory  Recent Labs     09/20/22  0111 09/20/22  0821 09/21/22  0158   WBC 11.1* 11.2* 10.1   RBC 3.31* 3.39* 3.30*   HEMOGLOBIN 10.2* 10.4* 10.0*   HEMATOCRIT 30.8* 31.8* 30.4*   MCV 93.1 93.8 92.1   MCH 30.8 30.7 30.3   MCHC 33.1* 32.7* 32.9*   RDW 41.6 41.3 41.1   PLATELETCT 347 358 392   MPV 10.9 10.6 10.0       Recent Labs     09/20/22  0111 09/20/22  0821 09/21/22  0158   SODIUM 141 139 139   POTASSIUM 3.6 3.5* 3.7   CHLORIDE 105 104 106   CO2 20 20 24   GLUCOSE 133* 98 105*   BUN 17 16 18   CREATININE 0.87 0.86 0.89   CALCIUM 8.9 9.1 8.8                     Imaging  DX-CHEST-PORTABLE (1 VIEW)   Final Result      Hypoinflation without other evidence for acute cardiopulmonary disease.      MR-BRAIN-W/O   Final Result      1.  Right subdural hematoma with postsurgical changes.   2.  Multiple chronic lacunar infarcts.   3.  Severe chronic microvascular ischemic disease.   4.  Moderate volume loss.      CT-HEAD W/O   Final Result         1.  Right craniotomy defect and subdural drain again identified.      2.  Residual subdural fluid and hemorrhage again identified which appears similar to prior exam. Pneumocephalus is again noted.      3.  Midline shift to the left side measuring 2 mm is unchanged.      4.  No new intracranial hemorrhage is identified.               CT-HEAD W/O   Final Result      Grossly similar postsurgical changes from right subdural hematoma evacuation with similar residual subdural hematoma and air .      Decreased midline shift, measuring about 2 mm.      No new hemorrhage.            CT-HEAD W/O   Final Result      Postsurgical changes status  post right-sided subdural hematoma evacuation with residual subdural hematoma and air and a subdural drain in place.      Multiple chronic bilateral lacunar infarcts.         DX-CHEST-LIMITED (1 VIEW)   Final Result      1.  No acute cardiac or pulmonary abnormalities are identified.      OUTSIDE IMAGES-DX UPPER EXTREMITY, RIGHT   Final Result      [id number] FILM X-RAY   Final Result      OUTSIDE IMAGES-CT HEAD   Final Result             Assessment/Plan  * Subdural hematoma (HCC)- (present on admission)  Assessment & Plan  Right-sided subdural hematoma with mass-effect  S/p right frontal parietal temporal craniotomy with evacuation of subdural hematoma and resection of subdural membrane. EVD placement 9/14  -Serial follow up CT scan has been stable with acute concenrs  -Subdural drain removed 9/17/2022  -Hemovac removed 9/16/2022.   -SBP goal of less than 160.   -on Keppra  -SCDs - DVT PPX started, ok by neurosurgery  -No anticoag for 10 days  -No asa    Maintain eunatremic, euglycemic, normothermic  PT/OT  Physiatry     Mri 9/17 Right subdural hematoma with postsurgical changes. Multiple chronic lacunar infarcts.  Remove staples on 9/28    Encephalopathy  Assessment & Plan  Multifactorial, sec to SDH with pneumocephalus, delirium, possibly complicated with aspiration pneumonia  CT head 9/17 noted Right craniotomy defect and subdural drain again identified. Residual subdural fluid and hemorrhage again identified which appears similar to prior exam  Start empirical antibiotics  Cont monitoring  Minimize risk of delirium such as avoiding day time napping and promote night time sleep, monitor for constipation, remove lines/tubing that is not needed, avoid early lab draws and vital checks, limit polypharmacy as able, and keep close to the window    Leukocytosis  Assessment & Plan  No fever, but given worsening mental status, concerning aspiration pneumonia  Check CXR, procalcitonin, blood culture  Start  empirical abx Unasyn     Coronary artery disease involving native coronary artery of native heart without angina pectoris- (present on admission)  Assessment & Plan  C/w statin  Not on beta blocker or ACEI/ARB at home    Pneumocephalus  Assessment & Plan  Pneumocephalus post right craniotomy with evacuation of subdural hematoma and resection of subdural membrane    CT head postop showed pneumocephalus-we will continue oxygen augmentation to decrease pulmonary nitrogen levels which will create a nitrogen gradient causing nitrogen pneumocephalus to diffuse into the lungs via blood.    Possibly continue for another 24-48hrs    Severe malnutrition (HCC)- (present on admission)  Assessment & Plan  -Start daily thiamine  -Nutritional consult has been done- follow nutritional recommendations  -Start Multivitamin  -Dietary following    Electrolyte abnormality- (present on admission)  Assessment & Plan  -Will continue to follow daily labs and replace as needed  -multivitamin and thiamine     Primary hypertension- (present on admission)  Assessment & Plan  Goal SBP less than 160  Continue amlodipine, 10 mg daily  IVP Antihypertensives as needed to keep within goal of SBP less than 160    Type 2 diabetes mellitus (HCC)- (present on admission)  Assessment & Plan  -Glycohemoglobin- 8.6  -Sliding scale insulin- adjust as needed  C/w glargine 7 units  -Monitor glucose with FSBS and CMP/BMP         VTE prophylaxis: SCDs/TEDs and enoxaparin ppx    I have performed a physical exam and reviewed and updated ROS and Plan today (9/21/2022). In review of yesterday's note (9/20/2022), there are no changes except as documented above.         no

## 2022-09-21 NOTE — DOCUMENTATION QUERY
[facility]                                                                       Query Response Note      PATIENT:               ADVID DE JESUS  ACCT #:                  [id number]  MRN:                     [id number]  :                      1940  ADMIT DATE:       2022 2:09 PM  DISCH DATE:          RESPONDING  PROVIDER #:        [id number]           QUERY TEXT:    Based on your medical judgment of the clinical indicators outlined above, please clarify if you are treating this patient for a known or suspected:    NOTE:  If an appropriate response is not listed below, please respond with a new note.    Thank you.    The patient's Clinical Indicators include:   Neurosurgery Note: 82- year old male with right frontal parietal SAH, midline shift.   H&P: Imaging reveals right-sided subdural hematoma with mass-effect.    Risk factor: SDH    Treatment: Neurosurgery consult, craniotomy, IV Labetalol    Thank you,  Lorna Castro  Clinical   Connect via [employer]  Options provided:   -- Cerebral compression   -- Vasogenic cerebral edema   -- Other explanation, Please specify   -- Findings of no clinical significance   -- Unable to determine      Query created by: Lorna Castro on 2022 12:27 PM    RESPONSE TEXT:    Cerebral compression          Electronically signed by:  ARIES PARKINSON MD 2022 5:30 PM

## 2022-09-21 NOTE — DISCHARGE PLANNING
Case Management Discharge Planning    Admission Date: 9/12/2022  GMLOS: 6.6  ALOS: 9    6-Clicks ADL Score: 18  6-Clicks Mobility Score: 13    Anticipated Discharge Dispo: Discharge Disposition: Disch to IP rehab facility or distinct part unit (62)    Action(s) Taken: RN CM lvm for [facility] admissions with request to return call.    Medically Clear: Yes    Next Steps: Follow up with [facility]    Barriers to Discharge: Placement acceptance    9-21-22/1448  Lauren with [facility] returned phone call and lvm.  She requested a cog eval and wanted to know if patient can tolerate 3 hours of therapy.  She will need to verify dc support.  They have beds this weekend. Voalte msg to Dr. Muñoz, bedside GRETCHEN Garcia, PT/OT/SLP.

## 2022-09-21 NOTE — THERAPY
"Physical Therapy   Daily Treatment     Patient Name: Alec Howell  Age:  82 y.o., Sex:  male  Medical Record #: [id number]  Today's Date: 9/20/2022     Precautions  Precautions: Fall Risk;Swallow Precautions ( See Comments)  Comments: s/p R crani    Assessment    Patient continues to be limited by impaired safely awareness, impaired balance, functional weakness, and decreased activity tolerance. He was also incontinent of urine during mobility which limited participation this session. He mobilized as detailed below; he was able to stand from EOB with FWW and min A. In standing he demonstrated cervical flexion, he reported pain with correction to neutral position and with fatigue was unable to maintain. Will continue to follow.    Plan    Continue current treatment plan.    DC Equipment Recommendations: Unable to determine at this time  Discharge Recommendations: Recommend post-acute placement for additional physical therapy services prior to discharge home      Subjective    \"I have to pee.\"     Objective       09/20/22 1605   Charge Group   Charges  Yes   PT Therapeutic Activities 2  (24 min)   Precautions   Precautions Fall Risk;Swallow Precautions ( See Comments)   Comments s/p R crani   Vitals   O2 (LPM) 0   O2 Delivery Device None - Room Air   Pain 0 - 10 Group   Therapist Pain Assessment   (no pain complaint during session)   Cognition    Cognition / Consciousness X   Speech/ Communication Hard of Hearing   Level of Consciousness Alert   Attention Impaired   Comments decreased STM, poor insight. pleasant   Balance   Sitting Balance (Static) Fair   Sitting Balance (Dynamic) Fair   Standing Balance (Static) Fair -   Standing Balance (Dynamic) Fair -   Weight Shift Sitting Fair   Weight Shift Standing Fair   Skilled Intervention Verbal Cuing;Compensatory Strategies;Sequencing   Comments standing with FWW, no overt LOB   Gait Analysis   Gait Level Of Assist Contact Guard Assist   Assistive Device Front " Wheel Walker   Distance (Feet) 5   # of Times Distance was Traveled 2   Deviation   (decreased step length)   # of Stairs Climbed 0   Weight Bearing Status no restrictions   Vision Deficits Impacting Mobility NT   Skilled Intervention Verbal Cuing;Compensatory Strategies;Sequencing   Bed Mobility    Supine to Sit Minimal Assist   Sit to Supine   (NT, left in chair)   Scooting Moderate Assist   Skilled Intervention Verbal Cuing;Compensatory Strategies;Facilitation   Comments patient reported sore buttocks, RN reported patient required manual disimpaction, required increased assist to scoot while sitting   Functional Mobility   Sit to Stand Minimal Assist   Bed, Chair, Wheelchair Transfer Minimal Assist   Transfer Method Stand Step   Skilled Intervention Verbal Cuing;Compensatory Strategies;Sequencing   How much difficulty does the patient currently have...   Turning over in bed (including adjusting bedclothes, sheets and blankets)? 2   Sitting down on and standing up from a chair with arms (e.g., wheelchair, bedside commode, etc.) 1   Moving from lying on back to sitting on the side of the bed? 2   How much help from another person does the patient currently need...   Moving to and from a bed to a chair (including a wheelchair)? 3   Need to walk in a hospital room? 3   Climbing 3-5 steps with a railing? 2   6 clicks Mobility Score 13   Activity Tolerance   Sitting in Chair left in chair   Sitting Edge of Bed 10-15 min   Standing 3-5 min total   Comments limited by urinary incontinence   Short Term Goals    Short Term Goal # 1 pt will perform supine <> sit with HOB flat, no railing and SPV in 6 visits   Goal Outcome # 1 Progressing as expected   Short Term Goal # 2 pt will perform sit <> stand and functional transfers with LRAD and SPV in 6 visits   Goal Outcome # 2 Progressing as expected   Short Term Goal # 3 pt will ambulate > 150 ft with LRAD and SPV to access community in 6 visits   Goal Outcome # 3 Goal not met    Short Term Goal # 4 pt will negotiate 1 step with LRAD and SPV to access home environment in 6 visits   Goal Outcome # 4 Goal not met   Anticipated Discharge Equipment and Recommendations   DC Equipment Recommendations Unable to determine at this time   Discharge Recommendations Recommend post-acute placement for additional physical therapy services prior to discharge home   Interdisciplinary Plan of Care Collaboration   IDT Collaboration with  Nursing;Certified Nursing Assistant   Patient Position at End of Therapy Seated;Chair Alarm On;Call Light within Reach;Tray Table within Reach;Phone within Reach   Collaboration Comments RN aware of visit, response   Session Information   Date / Session Number  9/20-2 (2/4, 9/21) attempted 9/16, 9/19

## 2022-09-21 NOTE — THERAPY
Physical Therapy   Daily Treatment     Patient Name: Alec Howell  Age:  82 y.o., Sex:  male  Medical Record #: [id number]  Today's Date: 9/21/2022     Precautions  Precautions: Fall Risk;Swallow Precautions ( See Comments)  Comments: s/p R crani    Assessment    Patient seen for PT tx session.  Patient continues to be limited by impaired safety awareness, poor insight, decreased activity tolerance, and decreased functional mobility which negatively impact safety & independence with functional mobility.  He required max cues to attend to task and perform LE exercises.  He impulsively stood x 2 from EOB, standing with flexed knees & forward flexed posture.  Once up in chair patient declined further exercises or mobility.  Patient would benefit from sitting up in chair for all meals and mobilizing with nursing staff.  PT will continue to follow.     Plan    Continue current treatment plan.    DC Equipment Recommendations: Unable to determine at this time  Discharge Recommendations: Recommend post-acute placement for additional physical therapy services prior to discharge home     Objective     09/21/22 0927   Precautions   Precautions Fall Risk;Swallow Precautions ( See Comments)   Comments s/p R crani   Cognition    Cognition / Consciousness X   Speech/ Communication Hard of Hearing   Level of Consciousness Alert   Attention Impaired   Comments Impulsive, poor insight.   Sitting Lower Body Exercises   Sitting Lower Body Exercises Yes   Long Arc Quad 1 set of 10;Bilateral   Marching Reciprocal;1 set of 10   Comments Max cues to perform exercises.  Attempted further exercises, pt declined.   Balance   Sitting Balance (Static) Fair   Sitting Balance (Dynamic) Fair   Standing Balance (Static) Fair -   Standing Balance (Dynamic) Fair -   Weight Shift Sitting Fair   Weight Shift Standing Poor   Skilled Intervention Verbal Cuing;Tactile Cuing;Sequencing   Gait Analysis   Gait Level Of Assist Contact Guard Assist    Assistive Device Front Wheel Walker   Distance (Feet) 4   # of Times Distance was Traveled 1   Deviation Shuffled Gait (decreased step length)   Weight Bearing Status no restrictions   Skilled Intervention Facilitation;Verbal Cuing;Tactile Cuing   Bed Mobility    Supine to Sit Minimal Assist   Sit to Supine (NT, left up in chair)   Skilled Intervention Verbal Cuing;Tactile Cuing   Functional Mobility   Sit to Stand Contact Guard Assist (impulisvely stood x 2 from EOB)   Bed, Chair, Wheelchair Transfer Minimal Assist   Transfer Method Stand Step   Mobility supine > sit EOB > STS x 3 > chair   Skilled Intervention Verbal Cuing;Tactile Cuing;Compensatory Strategies   Comments cues to reach for chair   Activity Tolerance   Sitting in Chair Post session   Sitting Edge of Bed 5 min   Standing 5 min   Short Term Goals    Short Term Goal # 1 pt will perform supine <> sit with HOB flat, no railing and SPV in 6 visits   Goal Outcome # 1 Progressing as expected   Short Term Goal # 2 pt will perform sit <> stand and functional transfers with LRAD and SPV in 6 visits   Goal Outcome # 2 Progressing as expected   Short Term Goal # 3 pt will ambulate > 150 ft with LRAD and SPV to access community in 6 visits   Goal Outcome # 3 Goal not met   Short Term Goal # 4 pt will negotiate 1 step with LRAD and SPV to access home environment in 6 visits   Goal Outcome # 4 Goal not met   Anticipated Discharge Equipment and Recommendations   DC Equipment Recommendations Unable to determine at this time   Discharge Recommendations Recommend post-acute placement for additional physical therapy services prior to discharge home

## 2022-09-21 NOTE — CARE PLAN
The patient is Watcher - Medium risk of patient condition declining or worsening    Shift Goals  Clinical Goals: pain management, increase PO intake  Patient Goals: go home  Family Goals: EMMANUEL    Progress made toward(s) clinical / shift goals:    Problem: Skin Integrity  Goal: Skin integrity is maintained or improved  Outcome: Progressing  Barrier cream, turns, mobility, and waffle mattress in place     Problem: Fall Risk  Goal: Patient will remain free from falls  Outcome: Progressing  Pt ambulating with FWW, calling appropriately for ambulation      Problem: Pain - Standard  Goal: Alleviation of pain or a reduction in pain to the patient’s comfort goal  Outcome: Progressing  PRN pain medication given      Problem: Neuro Status  Goal: Neuro status will remain stable or improve  Outcome: Progressing   PT AAOx 3-4     Patient is not progressing towards the following goals:

## 2022-09-21 NOTE — DIETARY
Nutrition Services Brief Update:    Day 9 of admit.  Alec Howell is a 82 y.o. male with admitting DX of Subdural hematoma.    Pt is on soft and bite-sized, diabetic diet with thin liquids and Boost glucose (vanilla/chocolate) with meals. PO intake is highly variable, average is 25-50% of meals and pt reports minimal appetite that has not improved. Pt does like Boost glucose shakes and is drinking % of all shakes, offered to add in strawberry flavor to mix it up and pt agreeable. Discussed food preferences and will update.    Problem: Nutritional:  Goal: Achieve adequate nutritional intake  Description: Patient will consume 25-50% of meals  Outcome: Progressing    RD following.

## 2022-09-22 LAB
ANION GAP SERPL CALC-SCNC: 14 MMOL/L (ref 7–16)
BASOPHILS # BLD AUTO: 1.5 % (ref 0–1.8)
BASOPHILS # BLD: 0.17 K/UL (ref 0–0.12)
BUN SERPL-MCNC: 14 MG/DL (ref 8–22)
CA-I SERPL-SCNC: 1.2 MMOL/L (ref 1.1–1.3)
CALCIUM SERPL-MCNC: 8.8 MG/DL (ref 8.5–10.5)
CHLORIDE SERPL-SCNC: 104 MMOL/L (ref 96–112)
CO2 SERPL-SCNC: 18 MMOL/L (ref 20–33)
CREAT SERPL-MCNC: 0.8 MG/DL (ref 0.5–1.4)
EOSINOPHIL # BLD AUTO: 0.53 K/UL (ref 0–0.51)
EOSINOPHIL NFR BLD: 4.6 % (ref 0–6.9)
ERYTHROCYTE [DISTWIDTH] IN BLOOD BY AUTOMATED COUNT: 41.8 FL (ref 35.9–50)
GFR SERPLBLD CREATININE-BSD FMLA CKD-EPI: 89 ML/MIN/1.73 M 2
GLUCOSE BLD STRIP.AUTO-MCNC: 141 MG/DL (ref 65–99)
GLUCOSE BLD STRIP.AUTO-MCNC: 146 MG/DL (ref 65–99)
GLUCOSE BLD STRIP.AUTO-MCNC: 171 MG/DL (ref 65–99)
GLUCOSE BLD STRIP.AUTO-MCNC: 96 MG/DL (ref 65–99)
GLUCOSE SERPL-MCNC: 92 MG/DL (ref 65–99)
HCT VFR BLD AUTO: 32.8 % (ref 42–52)
HGB BLD-MCNC: 10.7 G/DL (ref 14–18)
IMM GRANULOCYTES # BLD AUTO: 0.15 K/UL (ref 0–0.11)
IMM GRANULOCYTES NFR BLD AUTO: 1.3 % (ref 0–0.9)
LYMPHOCYTES # BLD AUTO: 2.42 K/UL (ref 1–4.8)
LYMPHOCYTES NFR BLD: 21 % (ref 22–41)
MAGNESIUM SERPL-MCNC: 1.7 MG/DL (ref 1.5–2.5)
MCH RBC QN AUTO: 30.2 PG (ref 27–33)
MCHC RBC AUTO-ENTMCNC: 32.6 G/DL (ref 33.7–35.3)
MCV RBC AUTO: 92.7 FL (ref 81.4–97.8)
MONOCYTES # BLD AUTO: 1.13 K/UL (ref 0–0.85)
MONOCYTES NFR BLD AUTO: 9.8 % (ref 0–13.4)
NEUTROPHILS # BLD AUTO: 7.15 K/UL (ref 1.82–7.42)
NEUTROPHILS NFR BLD: 61.8 % (ref 44–72)
NRBC # BLD AUTO: 0 K/UL
NRBC BLD-RTO: 0 /100 WBC
PHOSPHATE SERPL-MCNC: 3 MG/DL (ref 2.5–4.5)
PLATELET # BLD AUTO: 399 K/UL (ref 164–446)
PMV BLD AUTO: 9.7 FL (ref 9–12.9)
POTASSIUM SERPL-SCNC: 3.5 MMOL/L (ref 3.6–5.5)
RBC # BLD AUTO: 3.54 M/UL (ref 4.7–6.1)
SODIUM SERPL-SCNC: 136 MMOL/L (ref 135–145)
WBC # BLD AUTO: 11.6 K/UL (ref 4.8–10.8)

## 2022-09-22 PROCEDURE — 700105 HCHG RX REV CODE 258: Performed by: STUDENT IN AN ORGANIZED HEALTH CARE EDUCATION/TRAINING PROGRAM

## 2022-09-22 PROCEDURE — A9270 NON-COVERED ITEM OR SERVICE: HCPCS | Performed by: INTERNAL MEDICINE

## 2022-09-22 PROCEDURE — 83735 ASSAY OF MAGNESIUM: CPT

## 2022-09-22 PROCEDURE — 92526 ORAL FUNCTION THERAPY: CPT

## 2022-09-22 PROCEDURE — 99231 SBSQ HOSP IP/OBS SF/LOW 25: CPT | Performed by: STUDENT IN AN ORGANIZED HEALTH CARE EDUCATION/TRAINING PROGRAM

## 2022-09-22 PROCEDURE — 700102 HCHG RX REV CODE 250 W/ 637 OVERRIDE(OP): Performed by: INTERNAL MEDICINE

## 2022-09-22 PROCEDURE — 700102 HCHG RX REV CODE 250 W/ 637 OVERRIDE(OP): Performed by: PHYSICAL MEDICINE & REHABILITATION

## 2022-09-22 PROCEDURE — 92523 SPEECH SOUND LANG COMPREHEN: CPT

## 2022-09-22 PROCEDURE — 80048 BASIC METABOLIC PNL TOTAL CA: CPT

## 2022-09-22 PROCEDURE — 97535 SELF CARE MNGMENT TRAINING: CPT | Mod: CO

## 2022-09-22 PROCEDURE — 82962 GLUCOSE BLOOD TEST: CPT | Mod: 91

## 2022-09-22 PROCEDURE — 82330 ASSAY OF CALCIUM: CPT

## 2022-09-22 PROCEDURE — 700102 HCHG RX REV CODE 250 W/ 637 OVERRIDE(OP): Performed by: NURSE PRACTITIONER

## 2022-09-22 PROCEDURE — 84100 ASSAY OF PHOSPHORUS: CPT

## 2022-09-22 PROCEDURE — 36415 COLL VENOUS BLD VENIPUNCTURE: CPT

## 2022-09-22 PROCEDURE — A9270 NON-COVERED ITEM OR SERVICE: HCPCS | Performed by: NURSE PRACTITIONER

## 2022-09-22 PROCEDURE — 700102 HCHG RX REV CODE 250 W/ 637 OVERRIDE(OP): Performed by: STUDENT IN AN ORGANIZED HEALTH CARE EDUCATION/TRAINING PROGRAM

## 2022-09-22 PROCEDURE — 770001 HCHG ROOM/CARE - MED/SURG/GYN PRIV*

## 2022-09-22 PROCEDURE — A9270 NON-COVERED ITEM OR SERVICE: HCPCS | Performed by: PHYSICAL MEDICINE & REHABILITATION

## 2022-09-22 PROCEDURE — A9270 NON-COVERED ITEM OR SERVICE: HCPCS | Performed by: STUDENT IN AN ORGANIZED HEALTH CARE EDUCATION/TRAINING PROGRAM

## 2022-09-22 PROCEDURE — 700111 HCHG RX REV CODE 636 W/ 250 OVERRIDE (IP): Performed by: STUDENT IN AN ORGANIZED HEALTH CARE EDUCATION/TRAINING PROGRAM

## 2022-09-22 PROCEDURE — 85025 COMPLETE CBC W/AUTO DIFF WBC: CPT

## 2022-09-22 RX ADMIN — INSULIN GLARGINE-YFGN 7 UNITS: 100 INJECTION, SOLUTION SUBCUTANEOUS at 17:43

## 2022-09-22 RX ADMIN — Medication 5 MG: at 22:06

## 2022-09-22 RX ADMIN — DOCUSATE SODIUM 50 MG AND SENNOSIDES 8.6 MG 2 TABLET: 8.6; 5 TABLET, FILM COATED ORAL at 17:44

## 2022-09-22 RX ADMIN — GABAPENTIN 300 MG: 300 CAPSULE ORAL at 17:43

## 2022-09-22 RX ADMIN — AMPICILLIN SODIUM AND SULBACTAM SODIUM 3 G: 2; 1 INJECTION, POWDER, FOR SOLUTION INTRAMUSCULAR; INTRAVENOUS at 23:43

## 2022-09-22 RX ADMIN — ATORVASTATIN CALCIUM 40 MG: 40 TABLET, FILM COATED ORAL at 22:06

## 2022-09-22 RX ADMIN — INSULIN HUMAN 2 UNITS: 100 INJECTION, SOLUTION PARENTERAL at 17:43

## 2022-09-22 RX ADMIN — LEVETIRACETAM 500 MG: 500 TABLET, FILM COATED ORAL at 17:44

## 2022-09-22 RX ADMIN — ZONISAMIDE 100 MG: 50 CAPSULE ORAL at 22:06

## 2022-09-22 RX ADMIN — AMLODIPINE BESYLATE 10 MG: 10 TABLET ORAL at 05:32

## 2022-09-22 RX ADMIN — Medication 100 MG: at 05:32

## 2022-09-22 RX ADMIN — ENOXAPARIN SODIUM 40 MG: 40 INJECTION SUBCUTANEOUS at 17:44

## 2022-09-22 RX ADMIN — THERA TABS 1 TABLET: TAB at 05:32

## 2022-09-22 RX ADMIN — AMPICILLIN SODIUM AND SULBACTAM SODIUM 3 G: 2; 1 INJECTION, POWDER, FOR SOLUTION INTRAMUSCULAR; INTRAVENOUS at 12:05

## 2022-09-22 RX ADMIN — GABAPENTIN 300 MG: 300 CAPSULE ORAL at 05:32

## 2022-09-22 RX ADMIN — LEVETIRACETAM 500 MG: 500 TABLET, FILM COATED ORAL at 05:32

## 2022-09-22 RX ADMIN — AMPICILLIN SODIUM AND SULBACTAM SODIUM 3 G: 2; 1 INJECTION, POWDER, FOR SOLUTION INTRAMUSCULAR; INTRAVENOUS at 04:21

## 2022-09-22 RX ADMIN — GABAPENTIN 300 MG: 300 CAPSULE ORAL at 12:04

## 2022-09-22 RX ADMIN — AMPICILLIN SODIUM AND SULBACTAM SODIUM 3 G: 2; 1 INJECTION, POWDER, FOR SOLUTION INTRAMUSCULAR; INTRAVENOUS at 17:44

## 2022-09-22 RX ADMIN — ACETAMINOPHEN 650 MG: 325 TABLET, FILM COATED ORAL at 17:49

## 2022-09-22 ASSESSMENT — COGNITIVE AND FUNCTIONAL STATUS - GENERAL
HELP NEEDED FOR BATHING: A LOT
DRESSING REGULAR LOWER BODY CLOTHING: A LOT
TOILETING: A LITTLE
DAILY ACTIVITIY SCORE: 18
DRESSING REGULAR UPPER BODY CLOTHING: A LITTLE
SUGGESTED CMS G CODE MODIFIER DAILY ACTIVITY: CK

## 2022-09-22 ASSESSMENT — ENCOUNTER SYMPTOMS
SHORTNESS OF BREATH: 0
FEVER: 0
CHILLS: 0
ABDOMINAL PAIN: 0

## 2022-09-22 ASSESSMENT — PAIN DESCRIPTION - PAIN TYPE
TYPE: ACUTE PAIN;SURGICAL PAIN
TYPE: ACUTE PAIN

## 2022-09-22 NOTE — CARE PLAN
The patient is Stable - Low risk of patient condition declining or worsening    Shift Goals  Clinical Goals: Pain Management, Skin Integrity  Patient Goals: Sleep, Comfort  Family Goals: EMMANUEL      Problem: Fall Risk  Goal: Patient will remain free from falls  Outcome: Met     Problem: Pain - Standard  Goal: Alleviation of pain or a reduction in pain to the patient’s comfort goal  Outcome: Met

## 2022-09-22 NOTE — DISCHARGE PLANNING
Case Management Discharge Planning     Admission Date: 9/12/2022  GMLOS: 6.6  ALOS: 9     6-Clicks ADL Score: 18  6-Clicks Mobility Score: 13     Anticipated Discharge Dispo: Discharge Disposition: Disch to  rehab facility or distinct part unit (62)     Action(s) Taken: RN LORENA lvm for Lauren, with admissions at  [facility] admissions with request to return call.     Medically Clear: Yes     Next Steps: Follow up with [facility]     Barriers to Discharge: Placement acceptance

## 2022-09-22 NOTE — PROGRESS NOTES
[facility] Medicine Daily Progress Note    Date of Service  9/22/2022    Chief Complaint  Alec Howell is a 82 y.o. male admitted 9/12/2022 with SDH    Hospital Course  81 y/o M with CAD s/p stent, primary HTN, BPH, diabetes type II who presented 9/12/2022 to the ED at [facility] as a transfer from outside facility with a chief complaint of headache, right shoulder and hip pain after falling off of his toilet 2 days prior.  He denied any LOC, chest pain or shortness of breath. He does state that he has had chronic right-sided weakness, as well as decreased sensation to his right side for the past year or so that has not improved or worsened.  Shoulder and hip films at outlying facility were found to be negative for any fractures.  CT of head showed a 2.1 cm subdural hematoma in the right frontal area with a 1 mm right to left midline shift, therefore he was transferred to [facility] for neurosurgical intervention and critical care management.     Neurosurgery evaluated the patient - given the size of hematoma, evacuation was indicated. Pt underwent Right frontal parietal temporal craniotomy, evacuation acute, subacute subdural hematoma and resection subdural membrane on 9/14. He was monitored in ICU post op with q1hr neuro check, symptom management and monitoring of HVAC and EVD. Subdural drain removed 9/17/2022  Hemovac removed 9/16/2022. Patient transferred to neuro floor on 9/17.    He is on nonrebreather mask for pneumocephalus.    Interval Problem Update  Patient seen and examined resting in bed, afebrile. No overnight events   Very drowsy, able to answer simple questions.  His wbc were trending up so was started on abx empirically his CXR showed hypoinflation .  9/20: No overnight events.  Cleared by neurosurgery for discharge.  Vitals are stable.  9/21: Patient continues to report decreased appetite when eating solids but denies any difficulty swallowing.  We will include the Glucerna to 2  per meal.  Awaiting placement.  9/22: Patient is drinking all his Glucerna.  He did eat a complete meal yesterday it appears he does not like the hospital food.  Patient otherwise is doing well.  He passes cognitive evaluation.  Awaiting placement.    I have discussed this patient's plan of care and discharge plan at IDT rounds today with Case Management, Nursing, Nursing leadership, and other members of the IDT team.    Consultants/Specialty  critical care and neurosurgery    Code Status  Full Code    Disposition  Patient is medically cleared for discharge.   Anticipate discharge to to skilled nursing facility.  I have placed the appropriate orders for post-discharge needs.    Review of Systems  Review of Systems   Constitutional:  Negative for chills, fever and malaise/fatigue.   Respiratory:  Negative for shortness of breath.    Cardiovascular:  Negative for chest pain and leg swelling.   Gastrointestinal:  Negative for abdominal pain.   All other systems reviewed and are negative.     Physical Exam  Temp:  [36.2 °C (97.2 °F)-36.8 °C (98.2 °F)] 36.2 °C (97.2 °F)  Pulse:  [86-98] 98  Resp:  [16-18] 18  BP: (114-155)/(71-84) 155/84  SpO2:  [96 %-98 %] 98 %    Physical Exam  Vitals and nursing note reviewed. Exam conducted with a chaperone present.   Constitutional:       General: He is not in acute distress.     Appearance: Normal appearance.      Comments: Drowsy, able to answer simple questions   HENT:      Head:      Comments: Right craniotomy scar C/D/I  Staples in place      Mouth/Throat:      Pharynx: Oropharynx is clear.   Eyes:      General: No scleral icterus.        Right eye: No discharge.         Left eye: No discharge.   Neck:      Vascular: No carotid bruit.   Cardiovascular:      Rate and Rhythm: Normal rate and regular rhythm.      Heart sounds: Normal heart sounds.   Pulmonary:      Effort: Pulmonary effort is normal.      Breath sounds: Rales present.      Comments: On oxygen mask  Abdominal:       General: Abdomen is flat. Bowel sounds are normal. There is no distension.      Palpations: Abdomen is soft. There is no mass.      Tenderness: There is no abdominal tenderness. There is no guarding.   Musculoskeletal:         General: Normal range of motion.      Cervical back: Neck supple.      Right lower leg: No edema.      Left lower leg: No edema.   Skin:     General: Skin is warm and dry.   Neurological:      Mental Status: He is alert.      Comments: Move extremities spontaneously    Psychiatric:      Comments: Unable to assess       Fluids    Intake/Output Summary (Last 24 hours) at 9/22/2022 1333  Last data filed at 9/22/2022 0451  Gross per 24 hour   Intake 200 ml   Output --   Net 200 ml         Laboratory  Recent Labs     09/20/22  0821 09/21/22  0158 09/22/22  0327   WBC 11.2* 10.1 11.6*   RBC 3.39* 3.30* 3.54*   HEMOGLOBIN 10.4* 10.0* 10.7*   HEMATOCRIT 31.8* 30.4* 32.8*   MCV 93.8 92.1 92.7   MCH 30.7 30.3 30.2   MCHC 32.7* 32.9* 32.6*   RDW 41.3 41.1 41.8   PLATELETCT 358 392 399   MPV 10.6 10.0 9.7       Recent Labs     09/20/22  0821 09/21/22  0158 09/22/22  0327   SODIUM 139 139 136   POTASSIUM 3.5* 3.7 3.5*   CHLORIDE 104 106 104   CO2 20 24 18*   GLUCOSE 98 105* 92   BUN 16 18 14   CREATININE 0.86 0.89 0.80   CALCIUM 9.1 8.8 8.8                     Imaging  DX-CHEST-PORTABLE (1 VIEW)   Final Result      Hypoinflation without other evidence for acute cardiopulmonary disease.      MR-BRAIN-W/O   Final Result      1.  Right subdural hematoma with postsurgical changes.   2.  Multiple chronic lacunar infarcts.   3.  Severe chronic microvascular ischemic disease.   4.  Moderate volume loss.      CT-HEAD W/O   Final Result         1.  Right craniotomy defect and subdural drain again identified.      2.  Residual subdural fluid and hemorrhage again identified which appears similar to prior exam. Pneumocephalus is again noted.      3.  Midline shift to the left side measuring 2 mm is unchanged.      4.   No new intracranial hemorrhage is identified.               CT-HEAD W/O   Final Result      Grossly similar postsurgical changes from right subdural hematoma evacuation with similar residual subdural hematoma and air .      Decreased midline shift, measuring about 2 mm.      No new hemorrhage.            CT-HEAD W/O   Final Result      Postsurgical changes status post right-sided subdural hematoma evacuation with residual subdural hematoma and air and a subdural drain in place.      Multiple chronic bilateral lacunar infarcts.         DX-CHEST-LIMITED (1 VIEW)   Final Result      1.  No acute cardiac or pulmonary abnormalities are identified.      OUTSIDE IMAGES-DX UPPER EXTREMITY, RIGHT   Final Result      MC-[id number] FILM X-RAY   Final Result      OUTSIDE IMAGES-CT HEAD   Final Result             Assessment/Plan  * Subdural hematoma (HCC)- (present on admission)  Assessment & Plan  Right-sided subdural hematoma with mass-effect  S/p right frontal parietal temporal craniotomy with evacuation of subdural hematoma and resection of subdural membrane. EVD placement 9/14  -Serial follow up CT scan has been stable with acute concenrs  -Subdural drain removed 9/17/2022  -Hemovac removed 9/16/2022.   -SBP goal of less than 160.   -on Keppra  -SCDs - DVT PPX started, ok by neurosurgery  -No anticoag for 10 days  -No asa    Maintain eunatremic, euglycemic, normothermic  PT/OT  Physiatry     Mri 9/17 Right subdural hematoma with postsurgical changes. Multiple chronic lacunar infarcts.  Remove staples on 9/28    Encephalopathy  Assessment & Plan  Multifactorial, sec to SDH with pneumocephalus, delirium, possibly complicated with aspiration pneumonia  CT head 9/17 noted Right craniotomy defect and subdural drain again identified. Residual subdural fluid and hemorrhage again identified which appears similar to prior exam  Start empirical antibiotics  Cont monitoring  Minimize risk of delirium such as avoiding day time  napping and promote night time sleep, monitor for constipation, remove lines/tubing that is not needed, avoid early lab draws and vital checks, limit polypharmacy as able, and keep close to the window    Leukocytosis  Assessment & Plan  No fever, but given worsening mental status, concerning aspiration pneumonia  Check CXR, procalcitonin, blood culture  Start empirical abx Unasyn     Coronary artery disease involving native coronary artery of native heart without angina pectoris- (present on admission)  Assessment & Plan  C/w statin  Not on beta blocker or ACEI/ARB at home    Pneumocephalus  Assessment & Plan  Pneumocephalus post right craniotomy with evacuation of subdural hematoma and resection of subdural membrane    CT head postop showed pneumocephalus-we will continue oxygen augmentation to decrease pulmonary nitrogen levels which will create a nitrogen gradient causing nitrogen pneumocephalus to diffuse into the lungs via blood.    Possibly continue for another 24-48hrs    Severe malnutrition (HCC)- (present on admission)  Assessment & Plan  -Start daily thiamine  -Nutritional consult has been done- follow nutritional recommendations  -Start Multivitamin  -Dietary following    Electrolyte abnormality- (present on admission)  Assessment & Plan  -Will continue to follow daily labs and replace as needed  -multivitamin and thiamine     Primary hypertension- (present on admission)  Assessment & Plan  Goal SBP less than 160  Continue amlodipine, 10 mg daily  IVP Antihypertensives as needed to keep within goal of SBP less than 160    Type 2 diabetes mellitus (HCC)- (present on admission)  Assessment & Plan  -Glycohemoglobin- 8.6  -Sliding scale insulin- adjust as needed  C/w glargine 7 units  -Monitor glucose with FSBS and CMP/BMP         VTE prophylaxis: SCDs/TEDs and enoxaparin ppx    I have performed a physical exam and reviewed and updated ROS and Plan today (9/22/2022). In review of yesterday's note (9/21/2022),  there are no changes except as documented above.

## 2022-09-22 NOTE — THERAPY
Occupational Therapy  Daily Treatment     Patient Name: Alec Howell  Age:  82 y.o., Sex:  male  Medical Record #: [id number]  Today's Date: 9/22/2022       Precautions: Fall Risk, Swallow Precautions ( See Comments)  Comments: s/p R crani    Assessment    Pt seen for OT tx. Continues to be limited by decreased activity tolerance, balance deficits and poor safety awareness impacting ability to complete ADLs and ADL txfs independently. Amb w/ FWW in room w/ min A for balance and safety. Encouraged pt to amb w/ nrsg staff as tolerated. Pt verbalized understanding. Pt expressed motivation to regain strength and independence in ADLs/ADL transfers. Will continue to follow while in house.     Plan    Continue current treatment plan.    DC Equipment Recommendations: Unable to determine at this time  Discharge Recommendations: Recommend post-acute placement for additional occupational therapy services prior to discharge home       09/22/22 0831   Cognition    Cognition / Consciousness X   Speech/ Communication Hard of Hearing   Attention Impaired   Balance   Sitting Balance (Static) Fair   Sitting Balance (Dynamic) Fair   Standing Balance (Static) Fair -   Standing Balance (Dynamic) Fair -   Weight Shift Sitting Fair   Weight Shift Standing Fair   Bed Mobility    Supine to Sit Minimal Assist   Sit to Supine Minimal Assist   Activities of Daily Living   Grooming Minimal Assist;Standing   Upper Body Dressing Minimal Assist   Lower Body Dressing Maximal Assist   Toileting Maximal Assist   Functional Mobility   Sit to Stand Contact Guard Assist   Bed, Chair, Wheelchair Transfer Minimal Assist   Toilet Transfers Minimal Assist   Short Term Goals   Short Term Goal # 1 Pt will complete ADL transfers with supervision   Goal Outcome # 1 Progressing as expected   Short Term Goal # 2 Pt will complete full body dressing with supervision   Goal Outcome # 2 Progressing as expected   Short Term Goal # 3 Pt will complete standing  G/H with supervision   Goal Outcome # 3 Progressing as expected   Anticipated Discharge Equipment and Recommendations   DC Equipment Recommendations Unable to determine at this time   Discharge Recommendations Recommend post-acute placement for additional occupational therapy services prior to discharge home

## 2022-09-22 NOTE — THERAPY
Speech Language Pathology   Cognitive-Linguistic Evaluation     Patient Name: Alec Howell  AGE:  82 y.o., SEX:  male  Medical Record #: [id number]  Today's Date: 2022     Precautions  Precautions: Fall Risk, Swallow Precautions ( See Comments)  Comments: s/p R crani    Assessment    HPI: Pt is a 82 y.o. male who fell off his toilet two days ago and hit his head. He presented to [facility] for evaluation of a headache, right shoulder pain, and right hip pain. The patient denies any loss of consciousness. s/p right craniotomy for SDH on . Drain in place. No hx of SLP in Epic.    PMHx: DM, BPH    Prior Living Situation/Level of Function:  Patient lives w/ his son, Zackary, in [place], [place].     Subjective:  Patient reports increased difficulty remembering people's names. Zackary reports he was very concerned about his father's confusion over the last several days but he appears improved today.    Results:  COGNISTAT  Orientation: Mild  Attention: Moderate  Comprehension: Average  Repetition: Average  Naming: Average  Memory: Moderate-Severe  Calculations: Mild  Similarities: Average  Judgement: Mild    CLQT Clock Drawin/13 = Moderate    Impressions:  Pt presents with overall moderate cognitive deficits deficits in attention, memory, problem solving and executive functioning, based on formal and informal assessment measures.  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 Pt was A&Ox3, not sure of exact hospital or city but knew he was in [place]. Pt did know the month, year and approximate date. Pt was able to repeat back up to 4 digits. Given a 4 word recall task, pt recalled 4/4 immediately after 5 repetitions and 0/4 after a 5-minute delay. He recognized 2/4 words with cues. Patient demonstrated relatively good abstract reasoning but had more difficulty verbalizing specific, appropriate solutions to functional problems.    Recommendations:  Supervision Needs Upon  "Discharge: The pt will benefit from direct assistance for the following IADLs: Medication management, Financial management, Appointment management, Household chores, Cooking.   Patient would benefit from cognitive-linguistic tx at the post acute level of care.     Plan    Recommend Speech Therapy 4 times per week until therapy goals are met for the following treatments:  Dysphagia Training, Cognitive-Linguistic Training, and Patient / Family / Caregiver Education.    Discharge Recommendations: Recommend post-acute placement for additional speech therapy services prior to discharge home       Objective       09/22/22 1010   Patient / Family Goals   Patient / Family Goal #1 \"I'd like to get home and sit down in my big easy chair.\"   Short Term Goals   Short Term Goal # 2 Patient will be consistently oriented x4 with min cues for external memory aids.   Short Term Goal # 3 Patient will recall functional information with >80% accuracy following a 5-minute delay given mod cues for strategies.   Short Term Goal # 4 Patient will verbalize safe solutions to everyday problems with >80% accuracy given min cues.     "

## 2022-09-22 NOTE — THERAPY
"Speech Language Pathology  Daily Treatment     Patient Name: Alec Howell  Age:  82 y.o., Sex:  male  Medical Record #: [id number]  Today's Date: 9/22/2022     Precautions  Precautions: Fall Risk, Swallow Precautions ( See Comments)  Comments: s/p R crani    Assessment    Patient was seen for dysphagia tx with breakfast meal of soft/bite sized solids and thin liquids. Patient initially declined to eat any solid food, stating he wasn't hungry. However, given encouragement and education, he consumed ~4 bites of scrambled eggs and 8 oz Boost. Patient presents with slow mastication and reduced bolus formation with min R dental/lingual residue present post swallow that pt needed verbal cues to clear. No s/sx of aspiration appreciated w/ PO intake. He did not c/o any jaw pain today.     Recommendations:  Continue soft/bite sized solids and thin liquids  Assist w/ tray set up and encourage PO intake during meals  Pills whole w/ thins   Oral care BID  Refer to cognitive-linguistic evaluation to follow for additional recommendations    Plan    Treatment plan modified to 4 times per week until therapy goals are met for the following treatments:  Dysphagia Training, Cognitive-Linguistic Training, and Patient / Family / Caregiver Education.    Discharge Recommendations: Recommend post-acute placement for additional speech therapy services prior to discharge home       Objective       09/22/22 0946   Dysphagia    Diet / Liquid Recommendation Soft & Bite-Sized (6) - (Dysphagia III);Thin (0)   Nutritional Liquid Intake Rating Scale Non thickened beverages   Nutritional Food Intake Rating Scale Total oral diet with multiple consistencies without special preparation but with specific food limitations   Recommended Route of Medication Administration   Medication Administration  Whole with Liquid Wash   Patient / Family Goals   Patient / Family Goal #1 \"I don't want to choke\"   Goal #1 Outcome Progressing as expected   Short " Term Goals   Short Term Goal # 1 Pt will consume diet of SB6/TN0 w/ no overt s/sx of aspiration or exerbation of pain.   Goal Outcome # 1 Progressing slower than expected  (due to poor appetite)

## 2022-09-22 NOTE — CARE PLAN
The patient is Watcher - Medium risk of patient condition declining or worsening    Shift Goals  Clinical Goals: Increase PO intake  Patient Goals: Get comfortable  Family Goals: better nutritional intake    Progress made toward(s) clinical / shift goals:    Problem: Knowledge Deficit - Standard  Goal: Patient and family/care givers will demonstrate understanding of plan of care, disease process/condition, diagnostic tests and medications  Outcome: Progressing  Note: Patient understanding importance of nutritional intake for healing. Agreeable to plan  of care     Problem: Skin Integrity  Goal: Skin integrity is maintained or improved  Outcome: Progressing  Note: Q2 turns in place, waffle mattress, waffle cushion when up to chair       Patient is not progressing towards the following goals:

## 2022-09-23 LAB
ANION GAP SERPL CALC-SCNC: 10 MMOL/L (ref 7–16)
BACTERIA BLD CULT: NORMAL
BASOPHILS # BLD AUTO: 1.2 % (ref 0–1.8)
BASOPHILS # BLD: 0.13 K/UL (ref 0–0.12)
BUN SERPL-MCNC: 12 MG/DL (ref 8–22)
CA-I SERPL-SCNC: 1.2 MMOL/L (ref 1.1–1.3)
CALCIUM SERPL-MCNC: 8.8 MG/DL (ref 8.5–10.5)
CHLORIDE SERPL-SCNC: 103 MMOL/L (ref 96–112)
CO2 SERPL-SCNC: 22 MMOL/L (ref 20–33)
CREAT SERPL-MCNC: 0.78 MG/DL (ref 0.5–1.4)
EOSINOPHIL # BLD AUTO: 0.65 K/UL (ref 0–0.51)
EOSINOPHIL NFR BLD: 5.8 % (ref 0–6.9)
ERYTHROCYTE [DISTWIDTH] IN BLOOD BY AUTOMATED COUNT: 40.4 FL (ref 35.9–50)
GFR SERPLBLD CREATININE-BSD FMLA CKD-EPI: 90 ML/MIN/1.73 M 2
GLUCOSE BLD STRIP.AUTO-MCNC: 121 MG/DL (ref 65–99)
GLUCOSE BLD STRIP.AUTO-MCNC: 121 MG/DL (ref 65–99)
GLUCOSE BLD STRIP.AUTO-MCNC: 143 MG/DL (ref 65–99)
GLUCOSE BLD STRIP.AUTO-MCNC: 161 MG/DL (ref 65–99)
GLUCOSE SERPL-MCNC: 116 MG/DL (ref 65–99)
HCT VFR BLD AUTO: 32.9 % (ref 42–52)
HGB BLD-MCNC: 11.2 G/DL (ref 14–18)
IMM GRANULOCYTES # BLD AUTO: 0.15 K/UL (ref 0–0.11)
IMM GRANULOCYTES NFR BLD AUTO: 1.3 % (ref 0–0.9)
LYMPHOCYTES # BLD AUTO: 1.93 K/UL (ref 1–4.8)
LYMPHOCYTES NFR BLD: 17.1 % (ref 22–41)
MAGNESIUM SERPL-MCNC: 1.6 MG/DL (ref 1.5–2.5)
MCH RBC QN AUTO: 31 PG (ref 27–33)
MCHC RBC AUTO-ENTMCNC: 34 G/DL (ref 33.7–35.3)
MCV RBC AUTO: 91.1 FL (ref 81.4–97.8)
MONOCYTES # BLD AUTO: 0.98 K/UL (ref 0–0.85)
MONOCYTES NFR BLD AUTO: 8.7 % (ref 0–13.4)
NEUTROPHILS # BLD AUTO: 7.46 K/UL (ref 1.82–7.42)
NEUTROPHILS NFR BLD: 65.9 % (ref 44–72)
NRBC # BLD AUTO: 0 K/UL
NRBC BLD-RTO: 0 /100 WBC
PHOSPHATE SERPL-MCNC: 2.6 MG/DL (ref 2.5–4.5)
PLATELET # BLD AUTO: 447 K/UL (ref 164–446)
PMV BLD AUTO: 9.9 FL (ref 9–12.9)
POTASSIUM SERPL-SCNC: 3.1 MMOL/L (ref 3.6–5.5)
RBC # BLD AUTO: 3.61 M/UL (ref 4.7–6.1)
SIGNIFICANT IND 70042: NORMAL
SITE SITE: NORMAL
SODIUM SERPL-SCNC: 135 MMOL/L (ref 135–145)
SOURCE SOURCE: NORMAL
WBC # BLD AUTO: 11.3 K/UL (ref 4.8–10.8)

## 2022-09-23 PROCEDURE — A9270 NON-COVERED ITEM OR SERVICE: HCPCS | Performed by: STUDENT IN AN ORGANIZED HEALTH CARE EDUCATION/TRAINING PROGRAM

## 2022-09-23 PROCEDURE — 700102 HCHG RX REV CODE 250 W/ 637 OVERRIDE(OP): Performed by: PHYSICAL MEDICINE & REHABILITATION

## 2022-09-23 PROCEDURE — 700102 HCHG RX REV CODE 250 W/ 637 OVERRIDE(OP): Performed by: STUDENT IN AN ORGANIZED HEALTH CARE EDUCATION/TRAINING PROGRAM

## 2022-09-23 PROCEDURE — 82962 GLUCOSE BLOOD TEST: CPT | Mod: 91

## 2022-09-23 PROCEDURE — 36415 COLL VENOUS BLD VENIPUNCTURE: CPT

## 2022-09-23 PROCEDURE — 700102 HCHG RX REV CODE 250 W/ 637 OVERRIDE(OP): Performed by: INTERNAL MEDICINE

## 2022-09-23 PROCEDURE — 82330 ASSAY OF CALCIUM: CPT

## 2022-09-23 PROCEDURE — 700111 HCHG RX REV CODE 636 W/ 250 OVERRIDE (IP): Performed by: STUDENT IN AN ORGANIZED HEALTH CARE EDUCATION/TRAINING PROGRAM

## 2022-09-23 PROCEDURE — A9270 NON-COVERED ITEM OR SERVICE: HCPCS | Performed by: PHYSICAL MEDICINE & REHABILITATION

## 2022-09-23 PROCEDURE — 85025 COMPLETE CBC W/AUTO DIFF WBC: CPT

## 2022-09-23 PROCEDURE — 99231 SBSQ HOSP IP/OBS SF/LOW 25: CPT | Performed by: STUDENT IN AN ORGANIZED HEALTH CARE EDUCATION/TRAINING PROGRAM

## 2022-09-23 PROCEDURE — A9270 NON-COVERED ITEM OR SERVICE: HCPCS | Performed by: INTERNAL MEDICINE

## 2022-09-23 PROCEDURE — 84100 ASSAY OF PHOSPHORUS: CPT

## 2022-09-23 PROCEDURE — A9270 NON-COVERED ITEM OR SERVICE: HCPCS | Performed by: NURSE PRACTITIONER

## 2022-09-23 PROCEDURE — 770001 HCHG ROOM/CARE - MED/SURG/GYN PRIV*

## 2022-09-23 PROCEDURE — 700102 HCHG RX REV CODE 250 W/ 637 OVERRIDE(OP): Performed by: NURSE PRACTITIONER

## 2022-09-23 PROCEDURE — 80048 BASIC METABOLIC PNL TOTAL CA: CPT

## 2022-09-23 PROCEDURE — 97116 GAIT TRAINING THERAPY: CPT

## 2022-09-23 PROCEDURE — 83735 ASSAY OF MAGNESIUM: CPT

## 2022-09-23 PROCEDURE — 700105 HCHG RX REV CODE 258: Performed by: STUDENT IN AN ORGANIZED HEALTH CARE EDUCATION/TRAINING PROGRAM

## 2022-09-23 RX ORDER — MAGNESIUM SULFATE HEPTAHYDRATE 40 MG/ML
2 INJECTION, SOLUTION INTRAVENOUS ONCE
Status: COMPLETED | OUTPATIENT
Start: 2022-09-23 | End: 2022-09-23

## 2022-09-23 RX ORDER — POTASSIUM CHLORIDE 20 MEQ/1
40 TABLET, EXTENDED RELEASE ORAL ONCE
Status: COMPLETED | OUTPATIENT
Start: 2022-09-23 | End: 2022-09-23

## 2022-09-23 RX ADMIN — LEVETIRACETAM 500 MG: 500 TABLET, FILM COATED ORAL at 18:00

## 2022-09-23 RX ADMIN — AMPICILLIN SODIUM AND SULBACTAM SODIUM 3 G: 2; 1 INJECTION, POWDER, FOR SOLUTION INTRAMUSCULAR; INTRAVENOUS at 05:26

## 2022-09-23 RX ADMIN — INSULIN HUMAN 2 UNITS: 100 INJECTION, SOLUTION PARENTERAL at 21:49

## 2022-09-23 RX ADMIN — POTASSIUM CHLORIDE 40 MEQ: 1500 TABLET, EXTENDED RELEASE ORAL at 09:17

## 2022-09-23 RX ADMIN — Medication 100 MG: at 05:26

## 2022-09-23 RX ADMIN — LEVETIRACETAM 500 MG: 500 TABLET, FILM COATED ORAL at 05:26

## 2022-09-23 RX ADMIN — ZONISAMIDE 100 MG: 50 CAPSULE ORAL at 21:38

## 2022-09-23 RX ADMIN — MAGNESIUM SULFATE HEPTAHYDRATE 2 G: 40 INJECTION, SOLUTION INTRAVENOUS at 09:17

## 2022-09-23 RX ADMIN — DOCUSATE SODIUM 50 MG AND SENNOSIDES 8.6 MG 2 TABLET: 8.6; 5 TABLET, FILM COATED ORAL at 05:26

## 2022-09-23 RX ADMIN — AMLODIPINE BESYLATE 10 MG: 10 TABLET ORAL at 05:26

## 2022-09-23 RX ADMIN — GABAPENTIN 300 MG: 300 CAPSULE ORAL at 05:26

## 2022-09-23 RX ADMIN — ENOXAPARIN SODIUM 40 MG: 40 INJECTION SUBCUTANEOUS at 18:02

## 2022-09-23 RX ADMIN — Medication 5 MG: at 21:39

## 2022-09-23 RX ADMIN — ATORVASTATIN CALCIUM 40 MG: 40 TABLET, FILM COATED ORAL at 21:39

## 2022-09-23 RX ADMIN — THERA TABS 1 TABLET: TAB at 05:26

## 2022-09-23 ASSESSMENT — ENCOUNTER SYMPTOMS
SHORTNESS OF BREATH: 0
FEVER: 0
CHILLS: 0
ABDOMINAL PAIN: 0

## 2022-09-23 ASSESSMENT — PAIN DESCRIPTION - PAIN TYPE
TYPE: ACUTE PAIN;CHRONIC PAIN
TYPE: ACUTE PAIN

## 2022-09-23 ASSESSMENT — COGNITIVE AND FUNCTIONAL STATUS - GENERAL
MOBILITY SCORE: 13
CLIMB 3 TO 5 STEPS WITH RAILING: A LOT
WALKING IN HOSPITAL ROOM: A LITTLE
SUGGESTED CMS G CODE MODIFIER MOBILITY: CL
TURNING FROM BACK TO SIDE WHILE IN FLAT BAD: A LOT
MOVING FROM LYING ON BACK TO SITTING ON SIDE OF FLAT BED: A LOT
STANDING UP FROM CHAIR USING ARMS: A LITTLE
MOVING TO AND FROM BED TO CHAIR: UNABLE

## 2022-09-23 ASSESSMENT — GAIT ASSESSMENTS
DEVIATION: SHUFFLED GAIT
DISTANCE (FEET): 30
GAIT LEVEL OF ASSIST: CONTACT GUARD ASSIST
ASSISTIVE DEVICE: FRONT WHEEL WALKER

## 2022-09-23 NOTE — THERAPY
Physical Therapy   Daily Treatment     Patient Name: Alec Howell  Age:  82 y.o., Sex:  male  Medical Record #: [id number]  Today's Date: 9/23/2022     Precautions: Fall Risk;Swallow Precautions ( See Comments)  Comments: s/p R crani    Assessment    Patient seen for PT tx session.  Patient continues to be limited by decreased activity tolerance, functional mobility, safety awareness, and balance.  Patient progressed ambulation today, ambulating ~30 ft x 2 with FWW and CGA.  Patient ambulated with L step to gait, R short step through gait and flexed posture.  Educated patient on seated exercises as described below.  Continue to recommend post acute placement.  PT will continue to follow.    Plan    Continue current treatment plan.    DC Equipment Recommendations: Unable to determine at this time  Discharge Recommendations: Recommend post-acute placement for additional physical therapy services prior to discharge home     Objective     09/23/22 1407   Precautions   Precautions Fall Risk;Swallow Precautions ( See Comments)   Comments s/p R crani   Cognition    Cognition / Consciousness X   Speech/ Communication Hard of Hearing   Level of Consciousness Alert   Attention Impaired   Comments Irritable, cooperative   Sitting Lower Body Exercises   Sitting Lower Body Exercises Yes   Hip Abduction 1 set of 10;Bilateral   Long Arc Quad 1 set of 10;Bilateral   Marching Reciprocal;1 set of 10   Balance   Sitting Balance (Static) Fair   Sitting Balance (Dynamic) Fair   Standing Balance (Static) Fair -   Standing Balance (Dynamic) Fair -   Weight Shift Sitting Fair   Weight Shift Standing Fair   Skilled Intervention Verbal Cuing   Gait Analysis   Gait Level Of Assist Contact Guard Assist   Assistive Device Front Wheel Walker   Distance (Feet) 30   # of Times Distance was Traveled 2   Deviation Shuffled Gait (decreased step length, L step to gait > R, flexed posture)   Skilled Intervention Verbal Cuing;Tactile Cuing    Bed Mobility    Supine to Sit Minimal Assist   Sit to Supine (NT, left up in chair)   Scooting Minimal Assist   Skilled Intervention Verbal Cuing   Comments HOB elevated   Functional Mobility   Sit to Stand Contact Guard Assist   Bed, Chair, Wheelchair Transfer Contact Guard Assist   Transfer Method Stand Step   Mobility bed mobility, ambulation   Skilled Intervention Verbal Cuing;Tactile Cuing   Activity Tolerance   Sitting in Chair Post session   Sitting Edge of Bed <5 min   Standing ~8-10 min total   Short Term Goals    Short Term Goal # 1 pt will perform supine <> sit with HOB flat, no railing and SPV in 6 visits   Goal Outcome # 1 Progressing as expected   Short Term Goal # 2 pt will perform sit <> stand and functional transfers with LRAD and SPV in 6 visits   Goal Outcome # 2 Progressing as expected   Short Term Goal # 3 pt will ambulate > 150 ft with LRAD and SPV to access community in 6 visits   Goal Outcome # 3 Goal not met   Short Term Goal # 4 pt will negotiate 1 step with LRAD and SPV to access home environment in 6 visits   Goal Outcome # 4 Goal not met   Anticipated Discharge Equipment and Recommendations   DC Equipment Recommendations Unable to determine at this time   Discharge Recommendations Recommend post-acute placement for additional physical therapy services prior to discharge home

## 2022-09-23 NOTE — PROGRESS NOTES
Hospital Medicine Daily Progress Note    Date of Service  9/23/2022    Chief Complaint  Alec Howell is a 82 y.o. male admitted 9/12/2022 with SDH    Hospital Course  81 y/o M with CAD s/p stent, primary HTN, BPH, diabetes type II who presented 9/12/2022 to the ED at [facility] as a transfer from outside facility with a chief complaint of headache, right shoulder and hip pain after falling off of his toilet 2 days prior.  He denied any LOC, chest pain or shortness of breath. He does state that he has had chronic right-sided weakness, as well as decreased sensation to his right side for the past year or so that has not improved or worsened.  Shoulder and hip films at outlying facility were found to be negative for any fractures.  CT of head showed a 2.1 cm subdural hematoma in the right frontal area with a 1 mm right to left midline shift, therefore he was transferred to [facility] for neurosurgical intervention and critical care management.     Neurosurgery evaluated the patient - given the size of hematoma, evacuation was indicated. Pt underwent Right frontal parietal temporal craniotomy, evacuation acute, subacute subdural hematoma and resection subdural membrane on 9/14. He was monitored in ICU post op with q1hr neuro check, symptom management and monitoring of HVAC and EVD. Subdural drain removed 9/17/2022  Hemovac removed 9/16/2022. Patient transferred to neuro floor on 9/17.    He is on nonrebreather mask for pneumocephalus.    Interval Problem Update  Patient seen and examined resting in bed, afebrile. No overnight events   Very drowsy, able to answer simple questions.  His wbc were trending up so was started on abx empirically his CXR showed hypoinflation .  9/20: No overnight events.  Cleared by neurosurgery for discharge.  Vitals are stable.  9/21: Patient continues to report decreased appetite when eating solids but denies any difficulty swallowing.  We will include the Glucerna to 2  per meal.  Awaiting placement.  9/22: Patient is drinking all his Glucerna.  He did eat a complete meal yesterday it appears he does not like the hospital food.  Patient otherwise is doing well.  He passes cognitive evaluation.  Awaiting placement.  9/23: No overnight events.  Still awaiting placement to rehab.  Rehab reportedly wants to have note showing that the patient is more active with his therapy.    I have discussed this patient's plan of care and discharge plan at IDT rounds today with Case Management, Nursing, Nursing leadership, and other members of the IDT team.    Consultants/Specialty  critical care and neurosurgery    Code Status  Full Code    Disposition  Patient is medically cleared for discharge.   Anticipate discharge to to skilled nursing facility.  I have placed the appropriate orders for post-discharge needs.    Review of Systems  Review of Systems   Constitutional:  Negative for chills, fever and malaise/fatigue.   Respiratory:  Negative for shortness of breath.    Cardiovascular:  Negative for chest pain and leg swelling.   Gastrointestinal:  Negative for abdominal pain.   All other systems reviewed and are negative.     Physical Exam  Temp:  [36.4 °C (97.6 °F)-37.1 °C (98.8 °F)] 36.7 °C (98.1 °F)  Pulse:  [87-98] 98  Resp:  [18] 18  BP: (150-159)/(77-96) 159/86  SpO2:  [97 %-98 %] 97 %    Physical Exam  Vitals and nursing note reviewed. Exam conducted with a chaperone present.   Constitutional:       General: He is not in acute distress.     Appearance: Normal appearance.      Comments: Drowsy, able to answer simple questions   HENT:      Head:      Comments: Right craniotomy scar C/D/I  Staples in place      Mouth/Throat:      Pharynx: Oropharynx is clear.   Eyes:      General: No scleral icterus.        Right eye: No discharge.         Left eye: No discharge.   Neck:      Vascular: No carotid bruit.   Cardiovascular:      Rate and Rhythm: Normal rate and regular rhythm.      Heart sounds:  Normal heart sounds.   Pulmonary:      Effort: Pulmonary effort is normal.      Breath sounds: Rales present.      Comments: On oxygen mask  Abdominal:      General: Abdomen is flat. Bowel sounds are normal. There is no distension.      Palpations: Abdomen is soft. There is no mass.      Tenderness: There is no abdominal tenderness. There is no guarding.   Musculoskeletal:         General: Normal range of motion.      Cervical back: Neck supple.      Right lower leg: No edema.      Left lower leg: No edema.   Skin:     General: Skin is warm and dry.   Neurological:      Mental Status: He is alert.      Comments: Move extremities spontaneously    Psychiatric:      Comments: Unable to assess       Fluids    Intake/Output Summary (Last 24 hours) at 9/23/2022 1423  Last data filed at 9/23/2022 1225  Gross per 24 hour   Intake 300 ml   Output 1950 ml   Net -1650 ml         Laboratory  Recent Labs     09/21/22  0158 09/22/22  0327 09/23/22  0533   WBC 10.1 11.6* 11.3*   RBC 3.30* 3.54* 3.61*   HEMOGLOBIN 10.0* 10.7* 11.2*   HEMATOCRIT 30.4* 32.8* 32.9*   MCV 92.1 92.7 91.1   MCH 30.3 30.2 31.0   MCHC 32.9* 32.6* 34.0   RDW 41.1 41.8 40.4   PLATELETCT 392 399 447*   MPV 10.0 9.7 9.9       Recent Labs     09/21/22  0158 09/22/22  0327 09/23/22  0533   SODIUM 139 136 135   POTASSIUM 3.7 3.5* 3.1*   CHLORIDE 106 104 103   CO2 24 18* 22   GLUCOSE 105* 92 116*   BUN 18 14 12   CREATININE 0.89 0.80 0.78   CALCIUM 8.8 8.8 8.8                     Imaging  DX-CHEST-PORTABLE (1 VIEW)   Final Result      Hypoinflation without other evidence for acute cardiopulmonary disease.      MR-BRAIN-W/O   Final Result      1.  Right subdural hematoma with postsurgical changes.   2.  Multiple chronic lacunar infarcts.   3.  Severe chronic microvascular ischemic disease.   4.  Moderate volume loss.      CT-HEAD W/O   Final Result         1.  Right craniotomy defect and subdural drain again identified.      2.  Residual subdural fluid and  hemorrhage again identified which appears similar to prior exam. Pneumocephalus is again noted.      3.  Midline shift to the left side measuring 2 mm is unchanged.      4.  No new intracranial hemorrhage is identified.               CT-HEAD W/O   Final Result      Grossly similar postsurgical changes from right subdural hematoma evacuation with similar residual subdural hematoma and air .      Decreased midline shift, measuring about 2 mm.      No new hemorrhage.            CT-HEAD W/O   Final Result      Postsurgical changes status post right-sided subdural hematoma evacuation with residual subdural hematoma and air and a subdural drain in place.      Multiple chronic bilateral lacunar infarcts.         DX-CHEST-LIMITED (1 VIEW)   Final Result      1.  No acute cardiac or pulmonary abnormalities are identified.      OUTSIDE IMAGES-DX UPPER EXTREMITY, RIGHT   Final Result      GR-[id number] FILM X-RAY   Final Result      OUTSIDE IMAGES-CT HEAD   Final Result             Assessment/Plan  * Subdural hematoma (HCC)- (present on admission)  Assessment & Plan  Right-sided subdural hematoma with mass-effect  S/p right frontal parietal temporal craniotomy with evacuation of subdural hematoma and resection of subdural membrane. EVD placement 9/14  -Serial follow up CT scan has been stable with acute concenrs  -Subdural drain removed 9/17/2022  -Hemovac removed 9/16/2022.   -SBP goal of less than 160.   -on Keppra  -SCDs - DVT PPX started, ok by neurosurgery  -No anticoag for 10 days  -No asa    Maintain eunatremic, euglycemic, normothermic  PT/OT  Physiatry     Mri 9/17 Right subdural hematoma with postsurgical changes. Multiple chronic lacunar infarcts.  Remove staples on 9/28    Encephalopathy  Assessment & Plan  Multifactorial, sec to SDH with pneumocephalus, delirium, possibly complicated with aspiration pneumonia  CT head 9/17 noted Right craniotomy defect and subdural drain again identified. Residual subdural  fluid and hemorrhage again identified which appears similar to prior exam  Start empirical antibiotics  Cont monitoring  Minimize risk of delirium such as avoiding day time napping and promote night time sleep, monitor for constipation, remove lines/tubing that is not needed, avoid early lab draws and vital checks, limit polypharmacy as able, and keep close to the window    Leukocytosis  Assessment & Plan  No fever, but given worsening mental status, concerning aspiration pneumonia  Check CXR, procalcitonin, blood culture  Start empirical abx Unasyn     Coronary artery disease involving native coronary artery of native heart without angina pectoris- (present on admission)  Assessment & Plan  C/w statin  Not on beta blocker or ACEI/ARB at home    Pneumocephalus  Assessment & Plan  Pneumocephalus post right craniotomy with evacuation of subdural hematoma and resection of subdural membrane    CT head postop showed pneumocephalus-we will continue oxygen augmentation to decrease pulmonary nitrogen levels which will create a nitrogen gradient causing nitrogen pneumocephalus to diffuse into the lungs via blood.    Possibly continue for another 24-48hrs    Severe malnutrition (HCC)- (present on admission)  Assessment & Plan  -Start daily thiamine  -Nutritional consult has been done- follow nutritional recommendations  -Start Multivitamin  -Dietary following    Electrolyte abnormality- (present on admission)  Assessment & Plan  -Will continue to follow daily labs and replace as needed  -multivitamin and thiamine     Primary hypertension- (present on admission)  Assessment & Plan  Goal SBP less than 160  Continue amlodipine, 10 mg daily  IVP Antihypertensives as needed to keep within goal of SBP less than 160    Type 2 diabetes mellitus (HCC)- (present on admission)  Assessment & Plan  -Glycohemoglobin- 8.6  -Sliding scale insulin- adjust as needed  C/w glargine 7 units  -Monitor glucose with FSBS and CMP/BMP         VTE  prophylaxis: SCDs/TEDs and enoxaparin ppx    I have performed a physical exam and reviewed and updated ROS and Plan today (9/23/2022). In review of yesterday's note (9/22/2022), there are no changes except as documented above.

## 2022-09-23 NOTE — CARE PLAN
The patient is Stable - Low risk of patient condition declining or worsening    Shift Goals  Clinical Goals: increase PO intake  Patient Goals: be comfortable  Family Goals: better nutritional intake    Progress made toward(s) clinical / shift goals:    Problem: Neuro Status  Goal: Neuro status will remain stable or improve  Outcome: Progressing       Patient is not progressing towards the following goals:

## 2022-09-23 NOTE — DISCHARGE PLANNING
Agency/Facility Name: [facility]  Outcome: DPA left a voicemail for admissions regarding referral status. DPA requested a call back.       1023  Agency/Facility Name: [facility]   Outcome: DPA left a voicemail for admissions regarding referral status. DPA requested a call back.       1147  Agency/Facility Name: [facility]  Spoke To: Jyoti  Outcome: Nikita aguiar have received referral and everything needed. Nikita Montes MD will review referral. If pt is accepted they will start auth. No beds available until Monday. Jyoti to follow up with DPA regarding acceptance.     [employer] Notified.     Agency/Facility Name: [facility]  Spoke To: Jyoti   Outcome: Nikita Montes they need PT to see pt due to last note from PT pt did not want to continue. MD would like to make sure pt is able to handle 3 hours of PT.     RN CM Notified        1886  Agency/Facility Name: [facility]   Spoke To: Jyoti   Outcome: Nikita Montes pt has been accepted. She will be submitting for auth. Nikita Montes to have DPA follow on Monday for auth status.     RN CM Notified

## 2022-09-24 LAB
ANION GAP SERPL CALC-SCNC: 16 MMOL/L (ref 7–16)
BASOPHILS # BLD AUTO: 1.1 % (ref 0–1.8)
BASOPHILS # BLD: 0.16 K/UL (ref 0–0.12)
BUN SERPL-MCNC: 15 MG/DL (ref 8–22)
CA-I SERPL-SCNC: 1.2 MMOL/L (ref 1.1–1.3)
CALCIUM SERPL-MCNC: 9 MG/DL (ref 8.5–10.5)
CHLORIDE SERPL-SCNC: 100 MMOL/L (ref 96–112)
CO2 SERPL-SCNC: 19 MMOL/L (ref 20–33)
CREAT SERPL-MCNC: 0.96 MG/DL (ref 0.5–1.4)
EOSINOPHIL # BLD AUTO: 0.31 K/UL (ref 0–0.51)
EOSINOPHIL NFR BLD: 2.1 % (ref 0–6.9)
ERYTHROCYTE [DISTWIDTH] IN BLOOD BY AUTOMATED COUNT: 41.1 FL (ref 35.9–50)
GFR SERPLBLD CREATININE-BSD FMLA CKD-EPI: 80 ML/MIN/1.73 M 2
GLUCOSE BLD STRIP.AUTO-MCNC: 101 MG/DL (ref 65–99)
GLUCOSE BLD STRIP.AUTO-MCNC: 112 MG/DL (ref 65–99)
GLUCOSE BLD STRIP.AUTO-MCNC: 142 MG/DL (ref 65–99)
GLUCOSE BLD STRIP.AUTO-MCNC: 167 MG/DL (ref 65–99)
GLUCOSE SERPL-MCNC: 124 MG/DL (ref 65–99)
HCT VFR BLD AUTO: 34.7 % (ref 42–52)
HGB BLD-MCNC: 11.6 G/DL (ref 14–18)
IMM GRANULOCYTES # BLD AUTO: 0.25 K/UL (ref 0–0.11)
IMM GRANULOCYTES NFR BLD AUTO: 1.7 % (ref 0–0.9)
LYMPHOCYTES # BLD AUTO: 2.08 K/UL (ref 1–4.8)
LYMPHOCYTES NFR BLD: 13.9 % (ref 22–41)
MAGNESIUM SERPL-MCNC: 2 MG/DL (ref 1.5–2.5)
MCH RBC QN AUTO: 30.9 PG (ref 27–33)
MCHC RBC AUTO-ENTMCNC: 33.4 G/DL (ref 33.7–35.3)
MCV RBC AUTO: 92.3 FL (ref 81.4–97.8)
MONOCYTES # BLD AUTO: 0.92 K/UL (ref 0–0.85)
MONOCYTES NFR BLD AUTO: 6.1 % (ref 0–13.4)
NEUTROPHILS # BLD AUTO: 11.26 K/UL (ref 1.82–7.42)
NEUTROPHILS NFR BLD: 75.1 % (ref 44–72)
NRBC # BLD AUTO: 0 K/UL
NRBC BLD-RTO: 0 /100 WBC
PHOSPHATE SERPL-MCNC: 2.9 MG/DL (ref 2.5–4.5)
PLATELET # BLD AUTO: 545 K/UL (ref 164–446)
PMV BLD AUTO: 9.8 FL (ref 9–12.9)
POTASSIUM SERPL-SCNC: 3.7 MMOL/L (ref 3.6–5.5)
PROCALCITONIN SERPL-MCNC: 0.12 NG/ML
RBC # BLD AUTO: 3.76 M/UL (ref 4.7–6.1)
SODIUM SERPL-SCNC: 135 MMOL/L (ref 135–145)
WBC # BLD AUTO: 15 K/UL (ref 4.8–10.8)

## 2022-09-24 PROCEDURE — 700102 HCHG RX REV CODE 250 W/ 637 OVERRIDE(OP): Performed by: PHYSICAL MEDICINE & REHABILITATION

## 2022-09-24 PROCEDURE — 84145 PROCALCITONIN (PCT): CPT

## 2022-09-24 PROCEDURE — 83735 ASSAY OF MAGNESIUM: CPT

## 2022-09-24 PROCEDURE — A9270 NON-COVERED ITEM OR SERVICE: HCPCS | Performed by: INTERNAL MEDICINE

## 2022-09-24 PROCEDURE — 770001 HCHG ROOM/CARE - MED/SURG/GYN PRIV*

## 2022-09-24 PROCEDURE — 700101 HCHG RX REV CODE 250

## 2022-09-24 PROCEDURE — A9270 NON-COVERED ITEM OR SERVICE: HCPCS | Performed by: STUDENT IN AN ORGANIZED HEALTH CARE EDUCATION/TRAINING PROGRAM

## 2022-09-24 PROCEDURE — 700111 HCHG RX REV CODE 636 W/ 250 OVERRIDE (IP): Performed by: STUDENT IN AN ORGANIZED HEALTH CARE EDUCATION/TRAINING PROGRAM

## 2022-09-24 PROCEDURE — 99232 SBSQ HOSP IP/OBS MODERATE 35: CPT | Performed by: STUDENT IN AN ORGANIZED HEALTH CARE EDUCATION/TRAINING PROGRAM

## 2022-09-24 PROCEDURE — A9270 NON-COVERED ITEM OR SERVICE: HCPCS | Performed by: PHYSICAL MEDICINE & REHABILITATION

## 2022-09-24 PROCEDURE — A9270 NON-COVERED ITEM OR SERVICE: HCPCS

## 2022-09-24 PROCEDURE — 700102 HCHG RX REV CODE 250 W/ 637 OVERRIDE(OP)

## 2022-09-24 PROCEDURE — 82330 ASSAY OF CALCIUM: CPT

## 2022-09-24 PROCEDURE — 700102 HCHG RX REV CODE 250 W/ 637 OVERRIDE(OP): Performed by: STUDENT IN AN ORGANIZED HEALTH CARE EDUCATION/TRAINING PROGRAM

## 2022-09-24 PROCEDURE — 84100 ASSAY OF PHOSPHORUS: CPT

## 2022-09-24 PROCEDURE — A9270 NON-COVERED ITEM OR SERVICE: HCPCS | Performed by: NURSE PRACTITIONER

## 2022-09-24 PROCEDURE — 85025 COMPLETE CBC W/AUTO DIFF WBC: CPT

## 2022-09-24 PROCEDURE — 36415 COLL VENOUS BLD VENIPUNCTURE: CPT

## 2022-09-24 PROCEDURE — 700102 HCHG RX REV CODE 250 W/ 637 OVERRIDE(OP): Performed by: INTERNAL MEDICINE

## 2022-09-24 PROCEDURE — 80048 BASIC METABOLIC PNL TOTAL CA: CPT

## 2022-09-24 PROCEDURE — 700102 HCHG RX REV CODE 250 W/ 637 OVERRIDE(OP): Performed by: NURSE PRACTITIONER

## 2022-09-24 PROCEDURE — 82962 GLUCOSE BLOOD TEST: CPT | Mod: 91

## 2022-09-24 RX ORDER — MENTHOL AND METHYL SALICYLATE 7.6; 29 G/100G; G/100G
OINTMENT TOPICAL PRN
Status: DISCONTINUED | OUTPATIENT
Start: 2022-09-24 | End: 2022-09-27 | Stop reason: HOSPADM

## 2022-09-24 RX ORDER — LIDOCAINE 50 MG/G
1 PATCH TOPICAL EVERY 24 HOURS
Status: DISCONTINUED | OUTPATIENT
Start: 2022-09-24 | End: 2022-09-27 | Stop reason: HOSPADM

## 2022-09-24 RX ADMIN — GABAPENTIN 300 MG: 300 CAPSULE ORAL at 04:40

## 2022-09-24 RX ADMIN — OXYCODONE 5 MG: 5 TABLET ORAL at 01:36

## 2022-09-24 RX ADMIN — LEVETIRACETAM 500 MG: 500 TABLET, FILM COATED ORAL at 17:48

## 2022-09-24 RX ADMIN — MENTHOL AND METHYL SALICYLATE: 7.6; 29 OINTMENT TOPICAL at 04:40

## 2022-09-24 RX ADMIN — Medication 5 MG: at 21:04

## 2022-09-24 RX ADMIN — ZONISAMIDE 100 MG: 50 CAPSULE ORAL at 21:04

## 2022-09-24 RX ADMIN — INSULIN HUMAN 2 UNITS: 100 INJECTION, SOLUTION PARENTERAL at 12:17

## 2022-09-24 RX ADMIN — AMLODIPINE BESYLATE 10 MG: 10 TABLET ORAL at 04:40

## 2022-09-24 RX ADMIN — THERA TABS 1 TABLET: TAB at 04:40

## 2022-09-24 RX ADMIN — ATORVASTATIN CALCIUM 40 MG: 40 TABLET, FILM COATED ORAL at 21:04

## 2022-09-24 RX ADMIN — INSULIN GLARGINE-YFGN 7 UNITS: 100 INJECTION, SOLUTION SUBCUTANEOUS at 17:48

## 2022-09-24 RX ADMIN — LEVETIRACETAM 500 MG: 500 TABLET, FILM COATED ORAL at 04:40

## 2022-09-24 RX ADMIN — LIDOCAINE 1 PATCH: 50 PATCH CUTANEOUS at 04:40

## 2022-09-24 RX ADMIN — GABAPENTIN 300 MG: 300 CAPSULE ORAL at 12:17

## 2022-09-24 RX ADMIN — ENOXAPARIN SODIUM 40 MG: 40 INJECTION SUBCUTANEOUS at 17:48

## 2022-09-24 RX ADMIN — DOCUSATE SODIUM 50 MG AND SENNOSIDES 8.6 MG 2 TABLET: 8.6; 5 TABLET, FILM COATED ORAL at 04:40

## 2022-09-24 RX ADMIN — Medication 100 MG: at 04:40

## 2022-09-24 RX ADMIN — GABAPENTIN 300 MG: 300 CAPSULE ORAL at 17:48

## 2022-09-24 RX ADMIN — OXYCODONE 5 MG: 5 TABLET ORAL at 21:56

## 2022-09-24 ASSESSMENT — PAIN DESCRIPTION - PAIN TYPE
TYPE: ACUTE PAIN

## 2022-09-24 ASSESSMENT — ENCOUNTER SYMPTOMS
CHILLS: 0
FEVER: 0
ABDOMINAL PAIN: 0
SHORTNESS OF BREATH: 0
DIARRHEA: 0
COUGH: 0

## 2022-09-24 NOTE — CARE PLAN
The patient is Stable - Low risk of patient condition declining or worsening    Shift Goals  Clinical Goals: comfort  Patient Goals: go home  Family Goals: better nutritional intake    Progress made toward(s) clinical / shift goals:    Problem: Skin Integrity  Goal: Skin integrity is maintained or improved  Outcome: Progressing     Problem: Neuro Status  Goal: Neuro status will remain stable or improve  Outcome: Progressing  Patient is not progressing towards the following goals:

## 2022-09-24 NOTE — PROGRESS NOTES
Hospital Medicine Daily Progress Note    Date of Service  9/24/2022    Chief Complaint  Alec Howell is a 82 y.o. male admitted 9/12/2022 with SDH    Hospital Course  81 y/o M with CAD s/p stent, primary HTN, BPH, diabetes type II who presented 9/12/2022 to the ED at [facility] as a transfer from outside facility with a chief complaint of headache, right shoulder and hip pain after falling off of his toilet 2 days prior.  He denied any LOC, chest pain or shortness of breath. He does state that he has had chronic right-sided weakness, as well as decreased sensation to his right side for the past year or so that has not improved or worsened.  Shoulder and hip films at outlying facility were found to be negative for any fractures.  CT of head showed a 2.1 cm subdural hematoma in the right frontal area with a 1 mm right to left midline shift, therefore he was transferred to [facility] for neurosurgical intervention and critical care management.     Neurosurgery evaluated the patient - given the size of hematoma, evacuation was indicated. Pt underwent Right frontal parietal temporal craniotomy, evacuation acute, subacute subdural hematoma and resection subdural membrane on 9/14. He was monitored in ICU post op with q1hr neuro check, symptom management and monitoring of HVAC and EVD. Subdural drain removed 9/17/2022  Hemovac removed 9/16/2022. Patient transferred to neuro floor on 9/17.    He is on nonrebreather mask for pneumocephalus.    Interval Problem Update  Patient seen and examined resting in bed, afebrile. No overnight events   Very drowsy, able to answer simple questions.  His wbc were trending up so was started on abx empirically his CXR showed hypoinflation .  9/20: No overnight events.  Cleared by neurosurgery for discharge.  Vitals are stable.  9/21: Patient continues to report decreased appetite when eating solids but denies any difficulty swallowing.  We will include the Glucerna to 2  per meal.  Awaiting placement.  9/22: Patient is drinking all his Glucerna.  He did eat a complete meal yesterday it appears he does not like the hospital food.  Patient otherwise is doing well.  He passes cognitive evaluation.  Awaiting placement.  9/23: No overnight events.  Still awaiting placement to rehab.  Rehab reportedly wants to have note showing that the patient is more active with his therapy.  9/24: Increasing patient's white count no active signs of infection.  We will continue to monitor.  Patient reports he will work more with physical therapy.    I have discussed this patient's plan of care and discharge plan at IDT rounds today with Case Management, Nursing, Nursing leadership, and other members of the IDT team.    Consultants/Specialty  critical care and neurosurgery    Code Status  Full Code    Disposition  Patient is medically cleared for discharge.   Anticipate discharge to to skilled nursing facility.  I have placed the appropriate orders for post-discharge needs.    Review of Systems  Review of Systems   Constitutional:  Negative for chills, fever and malaise/fatigue.   Respiratory:  Negative for cough and shortness of breath.    Cardiovascular:  Negative for chest pain and leg swelling.   Gastrointestinal:  Negative for abdominal pain and diarrhea.   All other systems reviewed and are negative.     Physical Exam  Temp:  [36.5 °C (97.7 °F)-37 °C (98.6 °F)] 37 °C (98.6 °F)  Pulse:  [] 107  Resp:  [17-18] 17  BP: (128-155)/(74-82) 128/74  SpO2:  [98 %-99 %] 98 %    Physical Exam  Vitals and nursing note reviewed. Exam conducted with a chaperone present.   Constitutional:       General: He is not in acute distress.     Appearance: Normal appearance.      Comments: Drowsy, able to answer simple questions   HENT:      Head:      Comments: Right craniotomy scar C/D/I  Staples in place      Mouth/Throat:      Pharynx: Oropharynx is clear.   Eyes:      General: No scleral icterus.        Right  eye: No discharge.         Left eye: No discharge.   Neck:      Vascular: No carotid bruit.   Cardiovascular:      Rate and Rhythm: Normal rate and regular rhythm.      Heart sounds: Normal heart sounds.   Pulmonary:      Effort: Pulmonary effort is normal. No respiratory distress.      Breath sounds: Rales present.      Comments: On oxygen mask  Abdominal:      General: Abdomen is flat. Bowel sounds are normal. There is no distension.      Palpations: Abdomen is soft. There is no mass.      Tenderness: There is no abdominal tenderness. There is no guarding.   Musculoskeletal:         General: Normal range of motion.      Cervical back: Neck supple.      Right lower leg: No edema.      Left lower leg: No edema.   Skin:     General: Skin is warm and dry.   Neurological:      Mental Status: He is alert.      Comments: Move extremities spontaneously    Psychiatric:      Comments: Unable to assess       Fluids  No intake or output data in the 24 hours ending 09/24/22 1402      Laboratory  Recent Labs     09/22/22  0327 09/23/22  0533 09/24/22  0414   WBC 11.6* 11.3* 15.0*   RBC 3.54* 3.61* 3.76*   HEMOGLOBIN 10.7* 11.2* 11.6*   HEMATOCRIT 32.8* 32.9* 34.7*   MCV 92.7 91.1 92.3   MCH 30.2 31.0 30.9   MCHC 32.6* 34.0 33.4*   RDW 41.8 40.4 41.1   PLATELETCT 399 447* 545*   MPV 9.7 9.9 9.8     Recent Labs     09/22/22  0327 09/23/22  0533 09/24/22  0414   SODIUM 136 135 135   POTASSIUM 3.5* 3.1* 3.7   CHLORIDE 104 103 100   CO2 18* 22 19*   GLUCOSE 92 116* 124*   BUN 14 12 15   CREATININE 0.80 0.78 0.96   CALCIUM 8.8 8.8 9.0                   Imaging  DX-CHEST-PORTABLE (1 VIEW)   Final Result      Hypoinflation without other evidence for acute cardiopulmonary disease.      MR-BRAIN-W/O   Final Result      1.  Right subdural hematoma with postsurgical changes.   2.  Multiple chronic lacunar infarcts.   3.  Severe chronic microvascular ischemic disease.   4.  Moderate volume loss.      CT-HEAD W/O   Final Result         1.   Right craniotomy defect and subdural drain again identified.      2.  Residual subdural fluid and hemorrhage again identified which appears similar to prior exam. Pneumocephalus is again noted.      3.  Midline shift to the left side measuring 2 mm is unchanged.      4.  No new intracranial hemorrhage is identified.               CT-HEAD W/O   Final Result      Grossly similar postsurgical changes from right subdural hematoma evacuation with similar residual subdural hematoma and air .      Decreased midline shift, measuring about 2 mm.      No new hemorrhage.            CT-HEAD W/O   Final Result      Postsurgical changes status post right-sided subdural hematoma evacuation with residual subdural hematoma and air and a subdural drain in place.      Multiple chronic bilateral lacunar infarcts.         DX-CHEST-LIMITED (1 VIEW)   Final Result      1.  No acute cardiac or pulmonary abnormalities are identified.      OUTSIDE IMAGES-DX UPPER EXTREMITY, RIGHT   Final Result      [id number] FILM X-RAY   Final Result      OUTSIDE IMAGES-CT HEAD   Final Result             Assessment/Plan  * Subdural hematoma (HCC)- (present on admission)  Assessment & Plan  Right-sided subdural hematoma with mass-effect  S/p right frontal parietal temporal craniotomy with evacuation of subdural hematoma and resection of subdural membrane. EVD placement 9/14  -Serial follow up CT scan has been stable with acute concenrs  -Subdural drain removed 9/17/2022  -Hemovac removed 9/16/2022.   -SBP goal of less than 160.   -on Keppra  -SCDs - DVT PPX started, ok by neurosurgery  -No anticoag for 10 days  -No asa    Maintain eunatremic, euglycemic, normothermic  PT/OT  Physiatry     Mri 9/17 Right subdural hematoma with postsurgical changes. Multiple chronic lacunar infarcts.  Remove staples on 9/28    Encephalopathy  Assessment & Plan  Multifactorial, sec to SDH with pneumocephalus, delirium, possibly complicated with aspiration pneumonia  CT  head 9/17 noted Right craniotomy defect and subdural drain again identified. Residual subdural fluid and hemorrhage again identified which appears similar to prior exam  Start empirical antibiotics  Cont monitoring  Minimize risk of delirium such as avoiding day time napping and promote night time sleep, monitor for constipation, remove lines/tubing that is not needed, avoid early lab draws and vital checks, limit polypharmacy as able, and keep close to the window    Leukocytosis  Assessment & Plan  No fever, but given worsening mental status, concerning aspiration pneumonia  Check CXR, procalcitonin, blood culture  Leukocytosis had initially resolved after treatment with Unasyn however now is elevated again.  Procalcitonin ordered.    Coronary artery disease involving native coronary artery of native heart without angina pectoris- (present on admission)  Assessment & Plan  C/w statin  Not on beta blocker or ACEI/ARB at home    Pneumocephalus  Assessment & Plan  Pneumocephalus post right craniotomy with evacuation of subdural hematoma and resection of subdural membrane    CT head postop showed pneumocephalus-we will continue oxygen augmentation to decrease pulmonary nitrogen levels which will create a nitrogen gradient causing nitrogen pneumocephalus to diffuse into the lungs via blood.    Possibly continue for another 24-48hrs    Severe malnutrition (HCC)- (present on admission)  Assessment & Plan  -Start daily thiamine  -Nutritional consult has been done- follow nutritional recommendations  -Start Multivitamin  -Dietary following    Electrolyte abnormality- (present on admission)  Assessment & Plan  -Will continue to follow daily labs and replace as needed  -multivitamin and thiamine     Primary hypertension- (present on admission)  Assessment & Plan  Goal SBP less than 160  Continue amlodipine, 10 mg daily  IVP Antihypertensives as needed to keep within goal of SBP less than 160    Type 2 diabetes mellitus (HCC)-  (present on admission)  Assessment & Plan  -Glycohemoglobin- 8.6  -Sliding scale insulin- adjust as needed  C/w glargine 7 units  -Monitor glucose with FSBS and CMP/BMP       VTE prophylaxis: SCDs/TEDs and enoxaparin ppx    I have performed a physical exam and reviewed and updated ROS and Plan today (9/24/2022). In review of yesterday's note (9/23/2022), there are no changes except as documented above.

## 2022-09-24 NOTE — CARE PLAN
The patient is Stable - Low risk of patient condition declining or worsening    Shift Goals  Clinical Goals: discharge planning, sit in chair for meals  Patient Goals: get up more  Family Goals: better nutritional intake    Progress made toward(s) clinical / shift goals:  Pt ambulating up to bathroom with FWW as needed, up to chair for meals throughout the day.     Problem: Knowledge Deficit - Standard  Goal: Patient and family/care givers will demonstrate understanding of plan of care, disease process/condition, diagnostic tests and medications  Outcome: Progressing  Note: Pt updated on POC for the day.      Problem: Skin Integrity  Goal: Skin integrity is maintained or improved  Outcome: Progressing  Note: Pt on waffle cushion, Q2h turns and barrier cream used to maintain sin integrity.       Problem: Neuro Status  Goal: Neuro status will remain stable or improve  Outcome: Progressing  Note: Q4h neuro checks in place.        Patient is not progressing towards the following goals: n/a

## 2022-09-25 LAB
ANION GAP SERPL CALC-SCNC: 15 MMOL/L (ref 7–16)
BACTERIA BLD CULT: NORMAL
BUN SERPL-MCNC: 17 MG/DL (ref 8–22)
CA-I SERPL-SCNC: 1.2 MMOL/L (ref 1.1–1.3)
CALCIUM SERPL-MCNC: 8.8 MG/DL (ref 8.5–10.5)
CHLORIDE SERPL-SCNC: 101 MMOL/L (ref 96–112)
CO2 SERPL-SCNC: 19 MMOL/L (ref 20–33)
CREAT SERPL-MCNC: 0.87 MG/DL (ref 0.5–1.4)
GFR SERPLBLD CREATININE-BSD FMLA CKD-EPI: 87 ML/MIN/1.73 M 2
GLUCOSE BLD STRIP.AUTO-MCNC: 143 MG/DL (ref 65–99)
GLUCOSE BLD STRIP.AUTO-MCNC: 206 MG/DL (ref 65–99)
GLUCOSE BLD STRIP.AUTO-MCNC: 91 MG/DL (ref 65–99)
GLUCOSE SERPL-MCNC: 105 MG/DL (ref 65–99)
MAGNESIUM SERPL-MCNC: 1.8 MG/DL (ref 1.5–2.5)
PHOSPHATE SERPL-MCNC: 2.7 MG/DL (ref 2.5–4.5)
POTASSIUM SERPL-SCNC: 3.4 MMOL/L (ref 3.6–5.5)
SIGNIFICANT IND 70042: NORMAL
SITE SITE: NORMAL
SODIUM SERPL-SCNC: 135 MMOL/L (ref 135–145)
SOURCE SOURCE: NORMAL

## 2022-09-25 PROCEDURE — A9270 NON-COVERED ITEM OR SERVICE: HCPCS | Performed by: NURSE PRACTITIONER

## 2022-09-25 PROCEDURE — 700102 HCHG RX REV CODE 250 W/ 637 OVERRIDE(OP): Performed by: NURSE PRACTITIONER

## 2022-09-25 PROCEDURE — 700111 HCHG RX REV CODE 636 W/ 250 OVERRIDE (IP): Performed by: STUDENT IN AN ORGANIZED HEALTH CARE EDUCATION/TRAINING PROGRAM

## 2022-09-25 PROCEDURE — 82962 GLUCOSE BLOOD TEST: CPT

## 2022-09-25 PROCEDURE — 700101 HCHG RX REV CODE 250

## 2022-09-25 PROCEDURE — 84100 ASSAY OF PHOSPHORUS: CPT

## 2022-09-25 PROCEDURE — 700102 HCHG RX REV CODE 250 W/ 637 OVERRIDE(OP): Performed by: STUDENT IN AN ORGANIZED HEALTH CARE EDUCATION/TRAINING PROGRAM

## 2022-09-25 PROCEDURE — 82330 ASSAY OF CALCIUM: CPT

## 2022-09-25 PROCEDURE — 80048 BASIC METABOLIC PNL TOTAL CA: CPT

## 2022-09-25 PROCEDURE — 83735 ASSAY OF MAGNESIUM: CPT

## 2022-09-25 PROCEDURE — 770001 HCHG ROOM/CARE - MED/SURG/GYN PRIV*

## 2022-09-25 PROCEDURE — 36415 COLL VENOUS BLD VENIPUNCTURE: CPT

## 2022-09-25 PROCEDURE — A9270 NON-COVERED ITEM OR SERVICE: HCPCS | Performed by: INTERNAL MEDICINE

## 2022-09-25 PROCEDURE — 700102 HCHG RX REV CODE 250 W/ 637 OVERRIDE(OP): Performed by: INTERNAL MEDICINE

## 2022-09-25 PROCEDURE — A9270 NON-COVERED ITEM OR SERVICE: HCPCS | Performed by: STUDENT IN AN ORGANIZED HEALTH CARE EDUCATION/TRAINING PROGRAM

## 2022-09-25 PROCEDURE — A9270 NON-COVERED ITEM OR SERVICE: HCPCS | Performed by: PHYSICAL MEDICINE & REHABILITATION

## 2022-09-25 PROCEDURE — 99232 SBSQ HOSP IP/OBS MODERATE 35: CPT | Performed by: STUDENT IN AN ORGANIZED HEALTH CARE EDUCATION/TRAINING PROGRAM

## 2022-09-25 PROCEDURE — 700102 HCHG RX REV CODE 250 W/ 637 OVERRIDE(OP): Performed by: PHYSICAL MEDICINE & REHABILITATION

## 2022-09-25 RX ORDER — OXYCODONE HYDROCHLORIDE 5 MG/1
5 TABLET ORAL EVERY 6 HOURS PRN
Status: DISCONTINUED | OUTPATIENT
Start: 2022-09-25 | End: 2022-09-27 | Stop reason: HOSPADM

## 2022-09-25 RX ADMIN — LEVETIRACETAM 500 MG: 500 TABLET, FILM COATED ORAL at 17:17

## 2022-09-25 RX ADMIN — INSULIN GLARGINE-YFGN 7 UNITS: 100 INJECTION, SOLUTION SUBCUTANEOUS at 17:21

## 2022-09-25 RX ADMIN — DOCUSATE SODIUM 50 MG AND SENNOSIDES 8.6 MG 2 TABLET: 8.6; 5 TABLET, FILM COATED ORAL at 05:35

## 2022-09-25 RX ADMIN — ATORVASTATIN CALCIUM 40 MG: 40 TABLET, FILM COATED ORAL at 21:44

## 2022-09-25 RX ADMIN — ACETAMINOPHEN 650 MG: 325 TABLET, FILM COATED ORAL at 19:30

## 2022-09-25 RX ADMIN — ENOXAPARIN SODIUM 40 MG: 40 INJECTION SUBCUTANEOUS at 17:17

## 2022-09-25 RX ADMIN — AMLODIPINE BESYLATE 10 MG: 10 TABLET ORAL at 05:35

## 2022-09-25 RX ADMIN — LEVETIRACETAM 500 MG: 500 TABLET, FILM COATED ORAL at 05:35

## 2022-09-25 RX ADMIN — Medication 100 MG: at 05:35

## 2022-09-25 RX ADMIN — GABAPENTIN 300 MG: 300 CAPSULE ORAL at 12:34

## 2022-09-25 RX ADMIN — LIDOCAINE 1 PATCH: 50 PATCH CUTANEOUS at 14:55

## 2022-09-25 RX ADMIN — Medication 5 MG: at 21:45

## 2022-09-25 RX ADMIN — GABAPENTIN 300 MG: 300 CAPSULE ORAL at 05:35

## 2022-09-25 RX ADMIN — INSULIN HUMAN 3 UNITS: 100 INJECTION, SOLUTION PARENTERAL at 17:22

## 2022-09-25 RX ADMIN — ZONISAMIDE 100 MG: 50 CAPSULE ORAL at 21:44

## 2022-09-25 RX ADMIN — THERA TABS 1 TABLET: TAB at 05:36

## 2022-09-25 ASSESSMENT — ENCOUNTER SYMPTOMS
CHILLS: 0
SHORTNESS OF BREATH: 0
DIARRHEA: 0
COUGH: 0
FEVER: 0
NERVOUS/ANXIOUS: 1
ABDOMINAL PAIN: 0

## 2022-09-25 ASSESSMENT — PAIN DESCRIPTION - PAIN TYPE: TYPE: ACUTE PAIN

## 2022-09-25 NOTE — PROGRESS NOTES
Assumed patient care around 1900. Patient is alert and oriented x 4. However, pt is still confused in conversation and requires frequent reminders. Patient given hot packs for hand pain, and is being medicated per MAR.  Bed in lowest position and locked. Call light within reach and bed alarm is set. Pt is calling appropriately with the call light at this time.

## 2022-09-25 NOTE — CARE PLAN
The patient is Stable - Low risk of patient condition declining or worsening    Shift Goals  Clinical Goals: Q4h neuro checks, safety  Patient Goals: Discharge  Family Goals: EMMANUEL    Progress made toward(s) clinical / shift goals:      Problem: Skin Integrity  Goal: Skin integrity is maintained or improved  Outcome: Progressing  Pt is on a waffle mattress. Pt has redness to their groin and scrotum. Applying barrier paste, using barrier wipes, and frequently changing the patient's pads to prevent skin breakdown. Currently have the condom catheter off, as redness is surrounding areas where the condom catheter was on. Offering pt turns every two hours to prevent skin breakdown. Pt often refuses turns, and wants to remain supine. Pt given education on risks for skin breakdown, and does demonstrate an understanding in that education.     Problem: Neuro Status  Goal: Neuro status will remain stable or improve  Outcome: Progressing  Pt is Q4h neuro checks. No acute changes in the pt's neuro status noted at this time.     Patient is not progressing towards the following goals:

## 2022-09-25 NOTE — PROGRESS NOTES
Hospital Medicine Daily Progress Note    Date of Service  9/25/2022    Chief Complaint  Alec Howell is a 82 y.o. male admitted 9/12/2022 with SDH    Hospital Course  81 y/o M with CAD s/p stent, primary HTN, BPH, diabetes type II who presented 9/12/2022 to the ED at [facility] as a transfer from outside facility with a chief complaint of headache, right shoulder and hip pain after falling off of his toilet 2 days prior.  He denied any LOC, chest pain or shortness of breath. He does state that he has had chronic right-sided weakness, as well as decreased sensation to his right side for the past year or so that has not improved or worsened.  Shoulder and hip films at outlying facility were found to be negative for any fractures.  CT of head showed a 2.1 cm subdural hematoma in the right frontal area with a 1 mm right to left midline shift, therefore he was transferred to [facility] for neurosurgical intervention and critical care management.     Neurosurgery evaluated the patient - given the size of hematoma, evacuation was indicated. Pt underwent Right frontal parietal temporal craniotomy, evacuation acute, subacute subdural hematoma and resection subdural membrane on 9/14. He was monitored in ICU post op with q1hr neuro check, symptom management and monitoring of HVAC and EVD. Subdural drain removed 9/17/2022  Hemovac removed 9/16/2022. Patient transferred to neuro floor on 9/17.    He is on nonrebreather mask for pneumocephalus.    Interval Problem Update  Patient seen and examined resting in bed, afebrile. No overnight events   Very drowsy, able to answer simple questions.  His wbc were trending up so was started on abx empirically his CXR showed hypoinflation .  9/20: No overnight events.  Cleared by neurosurgery for discharge.  Vitals are stable.  9/21: Patient continues to report decreased appetite when eating solids but denies any difficulty swallowing.  We will include the Glucerna to 2  per meal.  Awaiting placement.  9/22: Patient is drinking all his Glucerna.  He did eat a complete meal yesterday it appears he does not like the hospital food.  Patient otherwise is doing well.  He passes cognitive evaluation.  Awaiting placement.  9/23: No overnight events.  Still awaiting placement to rehab.  Rehab reportedly wants to have note showing that the patient is more active with his therapy.  9/24: Increasing patient's white count no active signs of infection.  We will continue to monitor.  Patient reports he will work more with physical therapy.  9/25: No overnight events.  Patient expressing that he hopes to discharge soon.  Vitals are stable.      I have discussed this patient's plan of care and discharge plan at IDT rounds today with Case Management, Nursing, Nursing leadership, and other members of the IDT team.    Consultants/Specialty  critical care and neurosurgery    Code Status  Full Code    Disposition  Patient is medically cleared for discharge.   Anticipate discharge to to skilled nursing facility.  I have placed the appropriate orders for post-discharge needs.    Review of Systems  Review of Systems   Constitutional:  Negative for chills, fever and malaise/fatigue.   Respiratory:  Negative for cough and shortness of breath.    Cardiovascular:  Negative for chest pain and leg swelling.   Gastrointestinal:  Negative for abdominal pain and diarrhea.   Psychiatric/Behavioral:  The patient is nervous/anxious.    All other systems reviewed and are negative.     Physical Exam  Temp:  [36.7 °C (98 °F)-37.2 °C (99 °F)] 36.7 °C (98 °F)  Pulse:  [] 88  Resp:  [18] 18  BP: (118-149)/(62-76) 118/62  SpO2:  [96 %-99 %] 96 %    Physical Exam  Vitals and nursing note reviewed. Exam conducted with a chaperone present.   Constitutional:       General: He is not in acute distress.     Appearance: Normal appearance. He is not ill-appearing.      Comments: Drowsy, able to answer simple questions   HENT:       Head:      Comments: Right craniotomy scar C/D/I  Staples in place      Mouth/Throat:      Pharynx: Oropharynx is clear.   Eyes:      General: No scleral icterus.        Right eye: No discharge.         Left eye: No discharge.   Neck:      Vascular: No carotid bruit.   Cardiovascular:      Rate and Rhythm: Normal rate and regular rhythm.      Heart sounds: Normal heart sounds.   Pulmonary:      Effort: Pulmonary effort is normal. No respiratory distress.      Breath sounds: Rales present.      Comments: On oxygen mask  Abdominal:      General: Abdomen is flat. Bowel sounds are normal. There is no distension.      Palpations: Abdomen is soft. There is no mass.      Tenderness: There is no abdominal tenderness. There is no guarding.   Musculoskeletal:         General: Normal range of motion.      Cervical back: Neck supple.      Right lower leg: No edema.      Left lower leg: No edema.   Skin:     General: Skin is warm and dry.   Neurological:      Mental Status: He is alert.      Comments: Move extremities spontaneously    Psychiatric:      Comments: Unable to assess       Fluids    Intake/Output Summary (Last 24 hours) at 9/25/2022 1341  Last data filed at 9/24/2022 2100  Gross per 24 hour   Intake --   Output 110 ml   Net -110 ml         Laboratory  Recent Labs     09/23/22  0533 09/24/22  0414   WBC 11.3* 15.0*   RBC 3.61* 3.76*   HEMOGLOBIN 11.2* 11.6*   HEMATOCRIT 32.9* 34.7*   MCV 91.1 92.3   MCH 31.0 30.9   MCHC 34.0 33.4*   RDW 40.4 41.1   PLATELETCT 447* 545*   MPV 9.9 9.8       Recent Labs     09/23/22  0533 09/24/22  0414 09/25/22  0612   SODIUM 135 135 135   POTASSIUM 3.1* 3.7 3.4*   CHLORIDE 103 100 101   CO2 22 19* 19*   GLUCOSE 116* 124* 105*   BUN 12 15 17   CREATININE 0.78 0.96 0.87   CALCIUM 8.8 9.0 8.8                     Imaging  DX-CHEST-PORTABLE (1 VIEW)   Final Result      Hypoinflation without other evidence for acute cardiopulmonary disease.      MR-BRAIN-W/O   Final Result      1.   Right subdural hematoma with postsurgical changes.   2.  Multiple chronic lacunar infarcts.   3.  Severe chronic microvascular ischemic disease.   4.  Moderate volume loss.      CT-HEAD W/O   Final Result         1.  Right craniotomy defect and subdural drain again identified.      2.  Residual subdural fluid and hemorrhage again identified which appears similar to prior exam. Pneumocephalus is again noted.      3.  Midline shift to the left side measuring 2 mm is unchanged.      4.  No new intracranial hemorrhage is identified.               CT-HEAD W/O   Final Result      Grossly similar postsurgical changes from right subdural hematoma evacuation with similar residual subdural hematoma and air .      Decreased midline shift, measuring about 2 mm.      No new hemorrhage.            CT-HEAD W/O   Final Result      Postsurgical changes status post right-sided subdural hematoma evacuation with residual subdural hematoma and air and a subdural drain in place.      Multiple chronic bilateral lacunar infarcts.         DX-CHEST-LIMITED (1 VIEW)   Final Result      1.  No acute cardiac or pulmonary abnormalities are identified.      OUTSIDE IMAGES-DX UPPER EXTREMITY, RIGHT   Final Result      [id number] FILM X-RAY   Final Result      OUTSIDE IMAGES-CT HEAD   Final Result             Assessment/Plan  * Subdural hematoma (HCC)- (present on admission)  Assessment & Plan  Right-sided subdural hematoma with mass-effect  S/p right frontal parietal temporal craniotomy with evacuation of subdural hematoma and resection of subdural membrane. EVD placement 9/14  -Serial follow up CT scan has been stable with acute concenrs  -Subdural drain removed 9/17/2022  -Hemovac removed 9/16/2022.   -SBP goal of less than 160.   -on Keppra  -SCDs - DVT PPX started, ok by neurosurgery  -No anticoag for 10 days  -No asa    Maintain eunatremic, euglycemic, normothermic  PT/OT  Physiatry     Mri 9/17 Right subdural hematoma with  postsurgical changes. Multiple chronic lacunar infarcts.  Remove staples on 9/28    Encephalopathy  Assessment & Plan  Multifactorial, sec to SDH with pneumocephalus, delirium, possibly complicated with aspiration pneumonia  CT head 9/17 noted Right craniotomy defect and subdural drain again identified. Residual subdural fluid and hemorrhage again identified which appears similar to prior exam  Start empirical antibiotics  Cont monitoring  Minimize risk of delirium such as avoiding day time napping and promote night time sleep, monitor for constipation, remove lines/tubing that is not needed, avoid early lab draws and vital checks, limit polypharmacy as able, and keep close to the window    Leukocytosis  Assessment & Plan  No fever, but given worsening mental status, concerning aspiration pneumonia  Check CXR, procalcitonin, blood culture  Leukocytosis had initially resolved after treatment with Unasyn however now is elevated again.  Procalcitonin ordered.  9/25: Procalcitonin was normal we will continue to monitor off of antibiotics.    Coronary artery disease involving native coronary artery of native heart without angina pectoris- (present on admission)  Assessment & Plan  C/w statin  Not on beta blocker or ACEI/ARB at home    Pneumocephalus  Assessment & Plan  Pneumocephalus post right craniotomy with evacuation of subdural hematoma and resection of subdural membrane    CT head postop showed pneumocephalus-we will continue oxygen augmentation to decrease pulmonary nitrogen levels which will create a nitrogen gradient causing nitrogen pneumocephalus to diffuse into the lungs via blood.    Possibly continue for another 24-48hrs    Severe malnutrition (HCC)- (present on admission)  Assessment & Plan  -Start daily thiamine  -Nutritional consult has been done- follow nutritional recommendations  -Start Multivitamin  -Dietary following    Electrolyte abnormality- (present on admission)  Assessment & Plan  -Will  continue to follow daily labs and replace as needed  -multivitamin and thiamine     Primary hypertension- (present on admission)  Assessment & Plan  Goal SBP less than 160  Continue amlodipine, 10 mg daily  IVP Antihypertensives as needed to keep within goal of SBP less than 160    Type 2 diabetes mellitus (HCC)- (present on admission)  Assessment & Plan  -Glycohemoglobin- 8.6  -Sliding scale insulin- adjust as needed  C/w glargine 7 units  -Monitor glucose with FSBS and CMP/BMP         VTE prophylaxis: SCDs/TEDs and enoxaparin ppx    I have performed a physical exam and reviewed and updated ROS and Plan today (9/25/2022). In review of yesterday's note (9/24/2022), there are no changes except as documented above.

## 2022-09-25 NOTE — CARE PLAN
"The patient is Stable - Low risk of patient condition declining or worsening    Shift Goals  Clinical Goals: safety, stable neuro exams  Patient Goals: \"get out of here\"  Family Goals: n/a    Progress made toward(s) clinical / shift goals:    Problem: Knowledge Deficit - Standard  Goal: Patient and family/care givers will demonstrate understanding of plan of care, disease process/condition, diagnostic tests and medications  Outcome: Progressing  Note: Pt and family at bedside updated on POC for the day.      Problem: Skin Integrity  Goal: Skin integrity is maintained or improved  Outcome: Progressing  Note: Q2h turns, waffle cushion, barrier wipes and cream in place to maintain skin integrity.      Problem: Neuro Status  Goal: Neuro status will remain stable or improve  Outcome: Progressing  Note: Q4h neuro checks in place.        Patient is not progressing towards the following goals: n/a      "

## 2022-09-26 LAB
ANION GAP SERPL CALC-SCNC: 12 MMOL/L (ref 7–16)
BASOPHILS # BLD AUTO: 1.1 % (ref 0–1.8)
BASOPHILS # BLD: 0.16 K/UL (ref 0–0.12)
BUN SERPL-MCNC: 16 MG/DL (ref 8–22)
CA-I SERPL-SCNC: 1.2 MMOL/L (ref 1.1–1.3)
CALCIUM SERPL-MCNC: 8.5 MG/DL (ref 8.5–10.5)
CHLORIDE SERPL-SCNC: 102 MMOL/L (ref 96–112)
CO2 SERPL-SCNC: 21 MMOL/L (ref 20–33)
CREAT SERPL-MCNC: 0.91 MG/DL (ref 0.5–1.4)
EOSINOPHIL # BLD AUTO: 0.27 K/UL (ref 0–0.51)
EOSINOPHIL NFR BLD: 1.9 % (ref 0–6.9)
ERYTHROCYTE [DISTWIDTH] IN BLOOD BY AUTOMATED COUNT: 40.6 FL (ref 35.9–50)
GFR SERPLBLD CREATININE-BSD FMLA CKD-EPI: 85 ML/MIN/1.73 M 2
GLUCOSE BLD STRIP.AUTO-MCNC: 125 MG/DL (ref 65–99)
GLUCOSE BLD STRIP.AUTO-MCNC: 153 MG/DL (ref 65–99)
GLUCOSE SERPL-MCNC: 197 MG/DL (ref 65–99)
HCT VFR BLD AUTO: 33 % (ref 42–52)
HGB BLD-MCNC: 11.1 G/DL (ref 14–18)
IMM GRANULOCYTES # BLD AUTO: 0.26 K/UL (ref 0–0.11)
IMM GRANULOCYTES NFR BLD AUTO: 1.8 % (ref 0–0.9)
LYMPHOCYTES # BLD AUTO: 1.59 K/UL (ref 1–4.8)
LYMPHOCYTES NFR BLD: 11.1 % (ref 22–41)
MAGNESIUM SERPL-MCNC: 1.7 MG/DL (ref 1.5–2.5)
MCH RBC QN AUTO: 30.7 PG (ref 27–33)
MCHC RBC AUTO-ENTMCNC: 33.6 G/DL (ref 33.7–35.3)
MCV RBC AUTO: 91.2 FL (ref 81.4–97.8)
MONOCYTES # BLD AUTO: 0.88 K/UL (ref 0–0.85)
MONOCYTES NFR BLD AUTO: 6.2 % (ref 0–13.4)
NEUTROPHILS # BLD AUTO: 11.13 K/UL (ref 1.82–7.42)
NEUTROPHILS NFR BLD: 77.9 % (ref 44–72)
NRBC # BLD AUTO: 0 K/UL
NRBC BLD-RTO: 0 /100 WBC
PHOSPHATE SERPL-MCNC: 2.9 MG/DL (ref 2.5–4.5)
PLATELET # BLD AUTO: 528 K/UL (ref 164–446)
PMV BLD AUTO: 9.8 FL (ref 9–12.9)
POTASSIUM SERPL-SCNC: 3.7 MMOL/L (ref 3.6–5.5)
RBC # BLD AUTO: 3.62 M/UL (ref 4.7–6.1)
SODIUM SERPL-SCNC: 135 MMOL/L (ref 135–145)
WBC # BLD AUTO: 14.3 K/UL (ref 4.8–10.8)

## 2022-09-26 PROCEDURE — 700102 HCHG RX REV CODE 250 W/ 637 OVERRIDE(OP): Performed by: INTERNAL MEDICINE

## 2022-09-26 PROCEDURE — 82962 GLUCOSE BLOOD TEST: CPT | Mod: 91

## 2022-09-26 PROCEDURE — 700102 HCHG RX REV CODE 250 W/ 637 OVERRIDE(OP): Performed by: STUDENT IN AN ORGANIZED HEALTH CARE EDUCATION/TRAINING PROGRAM

## 2022-09-26 PROCEDURE — 85025 COMPLETE CBC W/AUTO DIFF WBC: CPT

## 2022-09-26 PROCEDURE — 82330 ASSAY OF CALCIUM: CPT

## 2022-09-26 PROCEDURE — 97530 THERAPEUTIC ACTIVITIES: CPT

## 2022-09-26 PROCEDURE — A9270 NON-COVERED ITEM OR SERVICE: HCPCS | Performed by: PHYSICAL MEDICINE & REHABILITATION

## 2022-09-26 PROCEDURE — 83735 ASSAY OF MAGNESIUM: CPT

## 2022-09-26 PROCEDURE — 97129 THER IVNTJ 1ST 15 MIN: CPT

## 2022-09-26 PROCEDURE — 97130 THER IVNTJ EA ADDL 15 MIN: CPT

## 2022-09-26 PROCEDURE — 700102 HCHG RX REV CODE 250 W/ 637 OVERRIDE(OP): Performed by: PHYSICAL MEDICINE & REHABILITATION

## 2022-09-26 PROCEDURE — 99231 SBSQ HOSP IP/OBS SF/LOW 25: CPT | Performed by: STUDENT IN AN ORGANIZED HEALTH CARE EDUCATION/TRAINING PROGRAM

## 2022-09-26 PROCEDURE — A9270 NON-COVERED ITEM OR SERVICE: HCPCS | Performed by: NURSE PRACTITIONER

## 2022-09-26 PROCEDURE — A9270 NON-COVERED ITEM OR SERVICE: HCPCS | Performed by: STUDENT IN AN ORGANIZED HEALTH CARE EDUCATION/TRAINING PROGRAM

## 2022-09-26 PROCEDURE — A9270 NON-COVERED ITEM OR SERVICE: HCPCS | Performed by: INTERNAL MEDICINE

## 2022-09-26 PROCEDURE — 700101 HCHG RX REV CODE 250

## 2022-09-26 PROCEDURE — 770001 HCHG ROOM/CARE - MED/SURG/GYN PRIV*

## 2022-09-26 PROCEDURE — 36415 COLL VENOUS BLD VENIPUNCTURE: CPT

## 2022-09-26 PROCEDURE — 700102 HCHG RX REV CODE 250 W/ 637 OVERRIDE(OP): Performed by: NURSE PRACTITIONER

## 2022-09-26 PROCEDURE — 97535 SELF CARE MNGMENT TRAINING: CPT | Mod: CO

## 2022-09-26 PROCEDURE — 84100 ASSAY OF PHOSPHORUS: CPT

## 2022-09-26 PROCEDURE — 80048 BASIC METABOLIC PNL TOTAL CA: CPT

## 2022-09-26 RX ADMIN — ZONISAMIDE 100 MG: 50 CAPSULE ORAL at 20:48

## 2022-09-26 RX ADMIN — ATORVASTATIN CALCIUM 40 MG: 40 TABLET, FILM COATED ORAL at 20:48

## 2022-09-26 RX ADMIN — THERA TABS 1 TABLET: TAB at 06:21

## 2022-09-26 RX ADMIN — Medication 100 MG: at 06:21

## 2022-09-26 RX ADMIN — AMLODIPINE BESYLATE 10 MG: 10 TABLET ORAL at 06:20

## 2022-09-26 RX ADMIN — LEVETIRACETAM 500 MG: 500 TABLET, FILM COATED ORAL at 06:20

## 2022-09-26 RX ADMIN — GABAPENTIN 300 MG: 300 CAPSULE ORAL at 06:21

## 2022-09-26 RX ADMIN — LIDOCAINE 1 PATCH: 50 PATCH CUTANEOUS at 06:21

## 2022-09-26 RX ADMIN — INSULIN HUMAN 2 UNITS: 100 INJECTION, SOLUTION PARENTERAL at 21:03

## 2022-09-26 RX ADMIN — Medication 5 MG: at 20:48

## 2022-09-26 ASSESSMENT — COGNITIVE AND FUNCTIONAL STATUS - GENERAL
SUGGESTED CMS G CODE MODIFIER MOBILITY: CL
MOBILITY SCORE: 13
STANDING UP FROM CHAIR USING ARMS: A LITTLE
TOILETING: A LITTLE
TURNING FROM BACK TO SIDE WHILE IN FLAT BAD: A LOT
WALKING IN HOSPITAL ROOM: A LITTLE
DRESSING REGULAR UPPER BODY CLOTHING: A LITTLE
MOVING TO AND FROM BED TO CHAIR: UNABLE
DRESSING REGULAR LOWER BODY CLOTHING: A LOT
DAILY ACTIVITIY SCORE: 18
HELP NEEDED FOR BATHING: A LOT
SUGGESTED CMS G CODE MODIFIER DAILY ACTIVITY: CK
MOVING FROM LYING ON BACK TO SITTING ON SIDE OF FLAT BED: A LOT
CLIMB 3 TO 5 STEPS WITH RAILING: A LOT

## 2022-09-26 ASSESSMENT — ENCOUNTER SYMPTOMS
SHORTNESS OF BREATH: 0
CHILLS: 0
NERVOUS/ANXIOUS: 1
COUGH: 0
DIARRHEA: 0
ABDOMINAL PAIN: 0
FEVER: 0

## 2022-09-26 ASSESSMENT — PAIN DESCRIPTION - PAIN TYPE
TYPE: ACUTE PAIN
TYPE: ACUTE PAIN

## 2022-09-26 ASSESSMENT — GAIT ASSESSMENTS
DISTANCE (FEET): 10
DEVIATION: SHUFFLED GAIT
ASSISTIVE DEVICE: FRONT WHEEL WALKER
GAIT LEVEL OF ASSIST: CONTACT GUARD ASSIST

## 2022-09-26 NOTE — THERAPY
"Physical Therapy   Daily Treatment     Patient Name: Alec Howell  Age:  82 y.o., Sex:  male  Medical Record #: [id number]  Today's Date: 9/26/2022     Precautions: Fall Risk;Swallow Precautions ( See Comments)  Comments: s/p R crani    Assessment    Patient seen for PT tx session.  Patient continues to be limited by impaired balance, cognition, activity tolerance, and safety awareness.  He mobilized as detailed below, refused further mobility after using bathroom.  Encouraged patient to sit up in chair for lunch, patient refused & became irritable.  PT will continue to follow.    Plan    Continue current treatment plan.    DC Equipment Recommendations: Unable to determine at this time  Discharge Recommendations: Recommend post-acute placement for additional physical therapy services prior to discharge home    Subjective    \"Will the questions ever end?!\"     Objective     09/26/22 1417   Precautions   Precautions Fall Risk;Swallow Precautions ( See Comments)   Comments s/p R crani   Cognition    Cognition / Consciousness X   Speech/ Communication Hard of Hearing   Level of Consciousness Alert   Attention Impaired   Comments Irritable   Balance   Sitting Balance (Static) Fair   Sitting Balance (Dynamic) Fair   Standing Balance (Static) Fair -   Standing Balance (Dynamic) Fair -   Weight Shift Sitting Fair   Weight Shift Standing Fair   Skilled Intervention Verbal Cuing;Tactile Cuing   Gait Analysis   Gait Level Of Assist Contact Guard Assist   Assistive Device Front Wheel Walker   Distance (Feet) 10   # of Times Distance was Traveled 1   Deviation Shuffled Gait (very flexed posture)   Skilled Intervention Verbal Cuing;Tactile Cuing   Comments Pt ambulated from EOB > bathroom with OT, assisted pt from bathroom > EOB, pt refused further mobility   Bed Mobility    Supine to Sit Minimal Assist   Sit to Supine Standby Assist   Skilled Intervention Verbal Cuing   Functional Mobility   Sit to Stand Contact Guard " Assist   Bed, Chair, Wheelchair Transfer Contact Guard Assist   Transfer Method Stand Step   Mobility bed mobility, ambulation   Skilled Intervention Verbal Cuing;Tactile Cuing   Activity Tolerance   Sitting Edge of Bed <5 min   Standing <5 min   Short Term Goals    Short Term Goal # 1 pt will perform supine <> sit with HOB flat, no railing and SPV in 6 visits   Goal Outcome # 1 goal not met   Short Term Goal # 2 pt will perform sit <> stand and functional transfers with LRAD and SPV in 6 visits   Goal Outcome # 2 Goal not met   Short Term Goal # 3 pt will ambulate > 150 ft with LRAD and SPV to access community in 6 visits   Goal Outcome # 3 Goal not met   Short Term Goal # 4 pt will negotiate 1 step with LRAD and SPV to access home environment in 6 visits   Goal Outcome # 4 Goal not met   Anticipated Discharge Equipment and Recommendations   DC Equipment Recommendations Unable to determine at this time   Discharge Recommendations Recommend post-acute placement for additional physical therapy services prior to discharge home

## 2022-09-26 NOTE — THERAPY
"Speech Language Pathology  Daily Treatment     Patient Name: Alec Howell  Age:  82 y.o., Sex:  male  Medical Record #: [id number]  Today's Date: 9/26/2022     Precautions  Precautions: Fall Risk, Swallow Precautions ( See Comments)  Comments: s/p R crani    HPI: Pt is a 82 y.o. male who fell off his toilet two days ago and hit his head. He presented to [place] for evaluation of a headache, right shoulder pain, and right hip pain. The patient denies any loss of consciousness. s/p right craniotomy for SDH on 9/14.     Subjective  Pt agreeable and cooperative w/ cognitive tx. Pt declined PO trials to upgrade diet from SB6, expressing that he continues to prefer softer solids. Pt expressed significant frustration w/ changes in POC to SNF consideration, reporting that he does not understand the rationale and that he wants to go home.     Assessment  SLP provided education concerning POC and safety concerns for d/c home. Pt did not verbalize understanding, continues to explain that he has support for home d/c and does not want to go to \"another hospital.\" Pt oriented to self, birthday, place, and general situation. Fair recall of hospital course. SLP provided education to pt about use of white board as orientation cue. Pt able to demonstrate successful use of white board for time orientation following 15-minute delay. SLP also oriented pt to safety guidelines as written on white board: diet, mobility, etc. Pt reports that he does not see a PT or OT, no functional recall of PT or OT recommendations. Pt did confirm x1 assist and walker use following SLP prompts. SLP explained safety risk for d/c home relative to this recommendation, pt w/ limited learning evidence.     Pt answered safety-related problem solving questions w/ 60% accuracy given min cues from SLP. Poor divergent thinking and abstraction noted throughout problem solving tasks, demonstrative of limited cognitive flexibility.     Clinical Impressions  Pt " "continues to present w/ cognitive impairment, evidenced by orientation deficits, memory deficits, and problem solving deficits. Pt continues to demonstrate need for assistance w/ IADLs and skilled SLP services at the acute and post-acute levels of care.     Note: It is not within the scope of practice of Speech-Language Pathologists to determine patient capacity. Please defer to the physician or psych to complete this assessment.    Recommendations  Supervision Needs Upon Discharge: The pt will benefit from intermittent supervision throughout the day and direct assistance for the following IADLs: Medication management, Financial management, Appointment management, Household chores, Cooking.      Plan    Continue current treatment plan.    Discharge Recommendations: Recommend post-acute placement for additional speech therapy services prior to discharge home       Objective   09/26/22 1204   Vitals   O2 Delivery Device None - Room Air   Cognitive-Linguistic   Level of Consciousness Alert   Orientation Level Not Oriented to Day;Not Oriented to Month   Simple Reasoning / Problem Solving Supervision (5)   Complex Reasoning  / Problem Solving Moderate (3)   [place] Reasoning Supervision (5)   Abstract Reasoning Moderate (3)   Insight into Deficits Moderate (3)   Functional Sequencing for ADL / IADLs Minimal (4)   Medication Management  Minimal (4)   Patient / Family Goals   Patient / Family Goal #1 \"I'd like to get home and sit down in my big easy chair.\"   Goal #1 Outcome Progressing as expected   Short Term Goals   Short Term Goal # 1 Pt will consume diet of SB6/TN0 w/ no overt s/sx of aspiration or exerbation of pain.   Goal Outcome # 1 Progressing as expected   Short Term Goal # 2 Patient will be consistently oriented x4 with min cues for external memory aids.   Goal Outcome # 2  Progressing as expected   Short Term Goal # 3 Patient will recall functional information with >80% accuracy following a 5-minute delay " given mod cues for strategies.   Goal Outcome  # 3 Progressing as expected   Short Term Goal # 4 Patient will verbalize safe solutions to everyday problems with >80% accuracy given min cues.   Goal Outcome  # 4 Progressing as expected   Education Group   Education Provided Dysphagia;Traumatic Brain Injury / Cognitive-Linguistic;Role of Speech Therapy   Interdisciplinary Plan of Care Collaboration   IDT Collaboration with  Nursing;Family / Caregiver

## 2022-09-26 NOTE — CARE PLAN
The patient is Stable - Low risk of patient condition declining or worsening    Shift Goals  Clinical Goals: maintain skin integrity, discharge planning  Patient Goals: go to rehab  Family Goals: n/a    Progress made toward(s) clinical / shift goals:      Problem: Knowledge Deficit - Standard  Goal: Patient and family/care givers will demonstrate understanding of plan of care, disease process/condition, diagnostic tests and medications  Outcome: Progressing  Note: Pt updated on POC for the day.      Problem: Skin Integrity  Goal: Skin integrity is maintained or improved  Outcome: Progressing  Note: Waffle cushion, barrier wipes and paste used to maintain skin integrity.      Problem: Neuro Status  Goal: Neuro status will remain stable or improve  Outcome: Progressing  Note: Q4h neuro checks in place.        Patient is not progressing towards the following goals: n/a

## 2022-09-26 NOTE — DISCHARGE PLANNING
"Case Management Discharge Planning    Admission Date: 9/12/2022  GMLOS: 6.6  ALOS: 14    6-Clicks ADL Score: 18  6-Clicks Mobility Score: 13  PT and/or OT Eval ordered: Yes  Post-acute Referrals Ordered: Yes  Post-acute Choice Obtained: Yes  Has referral(s) been sent to post-acute provider:  Yes      Anticipated Discharge Dispo: Discharge Disposition: Disch to  rehab facility or distinct part unit ()    DME Needed: No    Action(s) Taken: Updated Provider/Nurse on Discharge Plan, Choice obtained, Referral(s) sent, and Family Conference.   Met w/ Pt & family to notify ancipated D/C is now to SNF -- due to Pt not able to tolerate 3hrs of daily therapy (required by Acute Rehab).  Family stated, \"we will discuss the list & make choices\".  Choice forms completed for both [facility] & [facility] SNF's & uploaded to [facility].  Pending SNF acceptance.     Escalations Completed: None    Medically Clear: Yes    Next Steps: Pending acceptance from [facility] or [facility] SNF's.    Barriers to Discharge: Pending Placement    Is the patient up for discharge tomorrow: No.  Pending SNF acceptance.           "

## 2022-09-26 NOTE — THERAPY
Occupational Therapy  Daily Treatment     Patient Name: Alec Howell  Age:  82 y.o., Sex:  male  Medical Record #: [id number]  Today's Date: 9/26/2022       Precautions: Fall Risk, Swallow Precautions ( See Comments)  Comments: s/p crani    Assessment    Pt seen for OT tx. Continues to be limited by decreased activity tolerance, balance deficits, inattention and poor safety awareness impacting ability to complete ADLs and ADL transfers independently. Amb w/ FWW in room w/ CGA for balance and safety. Min A sit > stand from toilet d/t low surface height heavily using grab bar. Will continue to benefit from OT services while in house.     Plan    Continue current treatment plan.    DC Equipment Recommendations: Unable to determine at this time  Discharge Recommendations: Recommend post-acute placement for additional occupational therapy services prior to discharge home       09/26/22 1425   Balance   Sitting Balance (Static) Fair   Sitting Balance (Dynamic) Fair   Standing Balance (Static) Fair -   Standing Balance (Dynamic) Fair -   Weight Shift Sitting Fair   Weight Shift Standing Fair   Bed Mobility    Supine to Sit Minimal Assist   Sit to Supine Supervised   Activities of Daily Living   Grooming Minimal Assist;Standing   Upper Body Dressing Minimal Assist   Lower Body Dressing Maximal Assist   Toileting Maximal Assist   Functional Mobility   Sit to Stand Contact Guard Assist   Bed, Chair, Wheelchair Transfer Contact Guard Assist   Toilet Transfers Minimal Assist   Short Term Goals   Short Term Goal # 1 Pt will complete ADL transfers with supervision   Goal Outcome # 1 Progressing as expected   Short Term Goal # 2 Pt will complete full body dressing with supervision   Goal Outcome # 2 Progressing as expected   Short Term Goal # 3 Pt will complete standing G/H with supervision   Goal Outcome # 3 Progressing as expected   Anticipated Discharge Equipment and Recommendations   DC Equipment Recommendations  Unable to determine at this time   Discharge Recommendations Recommend post-acute placement for additional occupational therapy services prior to discharge home

## 2022-09-26 NOTE — DISCHARGE PLANNING
Received Choice form at 1150  Agency/Facility Name: [facility], [facility]  Referral sent per Choice form @ 1208     1215:  Received Choice form at 1200  Agency/Facility Name: [facility]  Referral sent per Choice form @ 1215

## 2022-09-26 NOTE — PROGRESS NOTES
Hospital Medicine Daily Progress Note    Date of Service  9/26/2022    Chief Complaint  Alec Howell is a 82 y.o. male admitted 9/12/2022 with SDH    Hospital Course  83 y/o M with CAD s/p stent, primary HTN, BPH, diabetes type II who presented 9/12/2022 to the ED at [facility] as a transfer from outside facility with a chief complaint of headache, right shoulder and hip pain after falling off of his toilet 2 days prior.  He denied any LOC, chest pain or shortness of breath. He does state that he has had chronic right-sided weakness, as well as decreased sensation to his right side for the past year or so that has not improved or worsened.  Shoulder and hip films at outlying facility were found to be negative for any fractures.  CT of head showed a 2.1 cm subdural hematoma in the right frontal area with a 1 mm right to left midline shift, therefore he was transferred to [facility] for neurosurgical intervention and critical care management.     Neurosurgery evaluated the patient - given the size of hematoma, evacuation was indicated. Pt underwent Right frontal parietal temporal craniotomy, evacuation acute, subacute subdural hematoma and resection subdural membrane on 9/14. He was monitored in ICU post op with q1hr neuro check, symptom management and monitoring of HVAC and EVD. Subdural drain removed 9/17/2022  Hemovac removed 9/16/2022. Patient transferred to neuro floor on 9/17.    He is on nonrebreather mask for pneumocephalus.    Interval Problem Update  Patient seen and examined resting in bed, afebrile. No overnight events   Very drowsy, able to answer simple questions.  His wbc were trending up so was started on abx empirically his CXR showed hypoinflation .  9/20: No overnight events.  Cleared by neurosurgery for discharge.  Vitals are stable.  9/21: Patient continues to report decreased appetite when eating solids but denies any difficulty swallowing.  We will include the Glucerna to 2  per meal.  Awaiting placement.  9/22: Patient is drinking all his Glucerna.  He did eat a complete meal yesterday it appears he does not like the hospital food.  Patient otherwise is doing well.  He passes cognitive evaluation.  Awaiting placement.  9/23: No overnight events.  Still awaiting placement to rehab.  Rehab reportedly wants to have note showing that the patient is more active with his therapy.  9/24: Increasing patient's white count no active signs of infection.  We will continue to monitor.  Patient reports he will work more with physical therapy.  9/25: No overnight events.  Patient expressing that he hopes to discharge soon.  Vitals are stable.    9/26: No overnight events.  SNF versus rehab    I have discussed this patient's plan of care and discharge plan at IDT rounds today with Case Management, Nursing, Nursing leadership, and other members of the IDT team.    Consultants/Specialty  critical care and neurosurgery    Code Status  Full Code    Disposition  Patient is medically cleared for discharge.   Anticipate discharge to to skilled nursing facility.  I have placed the appropriate orders for post-discharge needs.    Review of Systems  Review of Systems   Constitutional:  Negative for chills, fever and malaise/fatigue.   Respiratory:  Negative for cough and shortness of breath.    Cardiovascular:  Negative for chest pain and leg swelling.   Gastrointestinal:  Negative for abdominal pain and diarrhea.   Psychiatric/Behavioral:  The patient is nervous/anxious.    All other systems reviewed and are negative.     Physical Exam  Temp:  [36.4 °C (97.5 °F)-37.4 °C (99.3 °F)] 36.4 °C (97.5 °F)  Pulse:  [61-91] 89  Resp:  [18-19] 19  BP: (135-143)/(72-73) 143/73  SpO2:  [95 %-98 %] 96 %    Physical Exam  Vitals and nursing note reviewed. Exam conducted with a chaperone present.   Constitutional:       General: He is not in acute distress.     Appearance: Normal appearance. He is not ill-appearing.       Comments: Drowsy, able to answer simple questions   HENT:      Head:      Comments: Right craniotomy scar C/D/I  Staples in place      Mouth/Throat:      Pharynx: Oropharynx is clear.   Eyes:      General: No scleral icterus.        Right eye: No discharge.         Left eye: No discharge.   Neck:      Vascular: No carotid bruit.   Cardiovascular:      Rate and Rhythm: Normal rate and regular rhythm.      Heart sounds: Normal heart sounds.   Pulmonary:      Effort: Pulmonary effort is normal. No respiratory distress.      Breath sounds: Rales present.      Comments: On oxygen mask  Abdominal:      General: Abdomen is flat. Bowel sounds are normal. There is no distension.      Palpations: Abdomen is soft. There is no mass.      Tenderness: There is no abdominal tenderness. There is no guarding.   Musculoskeletal:         General: Normal range of motion.      Cervical back: Neck supple.      Right lower leg: No edema.      Left lower leg: No edema.   Skin:     General: Skin is warm and dry.   Neurological:      Mental Status: He is alert.      Comments: Move extremities spontaneously    Psychiatric:      Comments: Unable to assess       Fluids    Intake/Output Summary (Last 24 hours) at 9/26/2022 1041  Last data filed at 9/25/2022 2100  Gross per 24 hour   Intake --   Output 100 ml   Net -100 ml         Laboratory  Recent Labs     09/24/22  0414   WBC 15.0*   RBC 3.76*   HEMOGLOBIN 11.6*   HEMATOCRIT 34.7*   MCV 92.3   MCH 30.9   MCHC 33.4*   RDW 41.1   PLATELETCT 545*   MPV 9.8     Recent Labs     09/24/22  0414 09/25/22  0612   SODIUM 135 135   POTASSIUM 3.7 3.4*   CHLORIDE 100 101   CO2 19* 19*   GLUCOSE 124* 105*   BUN 15 17   CREATININE 0.96 0.87   CALCIUM 9.0 8.8                   Imaging  DX-CHEST-PORTABLE (1 VIEW)   Final Result      Hypoinflation without other evidence for acute cardiopulmonary disease.      MR-BRAIN-W/O   Final Result      1.  Right subdural hematoma with postsurgical changes.   2.  Multiple  chronic lacunar infarcts.   3.  Severe chronic microvascular ischemic disease.   4.  Moderate volume loss.      CT-HEAD W/O   Final Result         1.  Right craniotomy defect and subdural drain again identified.      2.  Residual subdural fluid and hemorrhage again identified which appears similar to prior exam. Pneumocephalus is again noted.      3.  Midline shift to the left side measuring 2 mm is unchanged.      4.  No new intracranial hemorrhage is identified.               CT-HEAD W/O   Final Result      Grossly similar postsurgical changes from right subdural hematoma evacuation with similar residual subdural hematoma and air .      Decreased midline shift, measuring about 2 mm.      No new hemorrhage.            CT-HEAD W/O   Final Result      Postsurgical changes status post right-sided subdural hematoma evacuation with residual subdural hematoma and air and a subdural drain in place.      Multiple chronic bilateral lacunar infarcts.         DX-CHEST-LIMITED (1 VIEW)   Final Result      1.  No acute cardiac or pulmonary abnormalities are identified.      OUTSIDE IMAGES-DX UPPER EXTREMITY, RIGHT   Final Result      MU-[id number]-KIRSNUU FILM X-RAY   Final Result      OUTSIDE IMAGES-CT HEAD   Final Result             Assessment/Plan  * Subdural hematoma (HCC)- (present on admission)  Assessment & Plan  Right-sided subdural hematoma with mass-effect  S/p right frontal parietal temporal craniotomy with evacuation of subdural hematoma and resection of subdural membrane. EVD placement 9/14  -Serial follow up CT scan has been stable with acute concenrs  -Subdural drain removed 9/17/2022  -Hemovac removed 9/16/2022.   -SBP goal of less than 160.   -on Keppra  -SCDs - DVT PPX started, ok by neurosurgery  -No anticoag for 10 days  -No asa    Maintain eunatremic, euglycemic, normothermic  PT/OT  Physiatry     Mri 9/17 Right subdural hematoma with postsurgical changes. Multiple chronic lacunar infarcts.  Remove staples on  9/28  Pending placement SNF versus rehab    Encephalopathy  Assessment & Plan  Multifactorial, sec to SDH with pneumocephalus, delirium, possibly complicated with aspiration pneumonia  CT head 9/17 noted Right craniotomy defect and subdural drain again identified. Residual subdural fluid and hemorrhage again identified which appears similar to prior exam  Start empirical antibiotics  Cont monitoring  Minimize risk of delirium such as avoiding day time napping and promote night time sleep, monitor for constipation, remove lines/tubing that is not needed, avoid early lab draws and vital checks, limit polypharmacy as able, and keep close to the window    Leukocytosis  Assessment & Plan  No fever, but given worsening mental status, concerning aspiration pneumonia  Check CXR, procalcitonin, blood culture  Leukocytosis had initially resolved after treatment with Unasyn however now is elevated again.  Procalcitonin ordered.  9/25: Procalcitonin was normal we will continue to monitor off of antibiotics.    Coronary artery disease involving native coronary artery of native heart without angina pectoris- (present on admission)  Assessment & Plan  C/w statin  Not on beta blocker or ACEI/ARB at home    Pneumocephalus  Assessment & Plan  Pneumocephalus post right craniotomy with evacuation of subdural hematoma and resection of subdural membrane    CT head postop showed pneumocephalus-we will continue oxygen augmentation to decrease pulmonary nitrogen levels which will create a nitrogen gradient causing nitrogen pneumocephalus to diffuse into the lungs via blood.    Possibly continue for another 24-48hrs    Severe malnutrition (HCC)- (present on admission)  Assessment & Plan  -Start daily thiamine  -Nutritional consult has been done- follow nutritional recommendations  -Start Multivitamin  -Dietary following    Electrolyte abnormality- (present on admission)  Assessment & Plan  -Will continue to follow daily labs and replace as  needed  -multivitamin and thiamine     Primary hypertension- (present on admission)  Assessment & Plan  Goal SBP less than 160  Continue amlodipine, 10 mg daily  IVP Antihypertensives as needed to keep within goal of SBP less than 160    Type 2 diabetes mellitus (HCC)- (present on admission)  Assessment & Plan  -Glycohemoglobin- 8.6  -Sliding scale insulin- adjust as needed  C/w glargine 7 units  -Monitor glucose with FSBS and CMP/BMP       VTE prophylaxis: SCDs/TEDs and enoxaparin ppx    I have performed a physical exam and reviewed and updated ROS and Plan today (9/26/2022). In review of yesterday's note (9/25/2022), there are no changes except as documented above.

## 2022-09-26 NOTE — CARE PLAN
Problem: Knowledge Deficit - Standard  Goal: Patient and family/care givers will demonstrate understanding of plan of care, disease process/condition, diagnostic tests and medications  Outcome: Progressing  Note: Update patient with POC, patient verbalizes understanding of POC, can be confused/forgetful in conversation at times, maintain safety.     Problem: Skin Integrity  Goal: Skin integrity is maintained or improved  Outcome: Progressing  Note: Change patient when incontinent, use of barrier cream, reposition patient, encourage mobility out of bed, increase PO intake.   The patient is Watcher - Medium risk of patient condition declining or worsening    Shift Goals  Clinical Goals: skin maintenance  Patient Goals: go to rehab  Family Goals: n/a    Progress made toward(s) clinical / shift goals:  see notes for progress    Patient is not progressing towards the following goals:

## 2022-09-27 VITALS
HEIGHT: 70 IN | HEART RATE: 90 BPM | SYSTOLIC BLOOD PRESSURE: 125 MMHG | WEIGHT: 160.27 LBS | DIASTOLIC BLOOD PRESSURE: 73 MMHG | TEMPERATURE: 98.1 F | OXYGEN SATURATION: 96 % | RESPIRATION RATE: 18 BRPM | BODY MASS INDEX: 22.95 KG/M2

## 2022-09-27 LAB
ANION GAP SERPL CALC-SCNC: 12 MMOL/L (ref 7–16)
BASOPHILS # BLD AUTO: 0.9 % (ref 0–1.8)
BASOPHILS # BLD: 0.11 K/UL (ref 0–0.12)
BUN SERPL-MCNC: 14 MG/DL (ref 8–22)
CA-I SERPL-SCNC: 1.2 MMOL/L (ref 1.1–1.3)
CALCIUM SERPL-MCNC: 9.1 MG/DL (ref 8.5–10.5)
CHLORIDE SERPL-SCNC: 101 MMOL/L (ref 96–112)
CO2 SERPL-SCNC: 23 MMOL/L (ref 20–33)
CREAT SERPL-MCNC: 0.83 MG/DL (ref 0.5–1.4)
EOSINOPHIL # BLD AUTO: 0.41 K/UL (ref 0–0.51)
EOSINOPHIL NFR BLD: 3.4 % (ref 0–6.9)
ERYTHROCYTE [DISTWIDTH] IN BLOOD BY AUTOMATED COUNT: 40.5 FL (ref 35.9–50)
GFR SERPLBLD CREATININE-BSD FMLA CKD-EPI: 88 ML/MIN/1.73 M 2
GLUCOSE BLD STRIP.AUTO-MCNC: 150 MG/DL (ref 65–99)
GLUCOSE BLD STRIP.AUTO-MCNC: 213 MG/DL (ref 65–99)
GLUCOSE SERPL-MCNC: 142 MG/DL (ref 65–99)
HCT VFR BLD AUTO: 35.2 % (ref 42–52)
HGB BLD-MCNC: 11.8 G/DL (ref 14–18)
IMM GRANULOCYTES # BLD AUTO: 0.23 K/UL (ref 0–0.11)
IMM GRANULOCYTES NFR BLD AUTO: 1.9 % (ref 0–0.9)
LYMPHOCYTES # BLD AUTO: 1.91 K/UL (ref 1–4.8)
LYMPHOCYTES NFR BLD: 15.9 % (ref 22–41)
MAGNESIUM SERPL-MCNC: 1.8 MG/DL (ref 1.5–2.5)
MCH RBC QN AUTO: 30.3 PG (ref 27–33)
MCHC RBC AUTO-ENTMCNC: 33.5 G/DL (ref 33.7–35.3)
MCV RBC AUTO: 90.3 FL (ref 81.4–97.8)
MONOCYTES # BLD AUTO: 0.93 K/UL (ref 0–0.85)
MONOCYTES NFR BLD AUTO: 7.7 % (ref 0–13.4)
NEUTROPHILS # BLD AUTO: 8.42 K/UL (ref 1.82–7.42)
NEUTROPHILS NFR BLD: 70.2 % (ref 44–72)
NRBC # BLD AUTO: 0 K/UL
NRBC BLD-RTO: 0 /100 WBC
PHOSPHATE SERPL-MCNC: 2.9 MG/DL (ref 2.5–4.5)
PLATELET # BLD AUTO: 591 K/UL (ref 164–446)
PMV BLD AUTO: 9.6 FL (ref 9–12.9)
POTASSIUM SERPL-SCNC: 3.5 MMOL/L (ref 3.6–5.5)
RBC # BLD AUTO: 3.9 M/UL (ref 4.7–6.1)
SODIUM SERPL-SCNC: 136 MMOL/L (ref 135–145)
WBC # BLD AUTO: 12 K/UL (ref 4.8–10.8)

## 2022-09-27 PROCEDURE — 80048 BASIC METABOLIC PNL TOTAL CA: CPT

## 2022-09-27 PROCEDURE — 700102 HCHG RX REV CODE 250 W/ 637 OVERRIDE(OP): Performed by: STUDENT IN AN ORGANIZED HEALTH CARE EDUCATION/TRAINING PROGRAM

## 2022-09-27 PROCEDURE — 700101 HCHG RX REV CODE 250

## 2022-09-27 PROCEDURE — 82962 GLUCOSE BLOOD TEST: CPT

## 2022-09-27 PROCEDURE — A9270 NON-COVERED ITEM OR SERVICE: HCPCS | Performed by: STUDENT IN AN ORGANIZED HEALTH CARE EDUCATION/TRAINING PROGRAM

## 2022-09-27 PROCEDURE — 85025 COMPLETE CBC W/AUTO DIFF WBC: CPT

## 2022-09-27 PROCEDURE — 700102 HCHG RX REV CODE 250 W/ 637 OVERRIDE(OP): Performed by: PHYSICAL MEDICINE & REHABILITATION

## 2022-09-27 PROCEDURE — 99239 HOSP IP/OBS DSCHRG MGMT >30: CPT | Performed by: HOSPITALIST

## 2022-09-27 PROCEDURE — A9270 NON-COVERED ITEM OR SERVICE: HCPCS | Performed by: HOSPITALIST

## 2022-09-27 PROCEDURE — 700102 HCHG RX REV CODE 250 W/ 637 OVERRIDE(OP): Performed by: HOSPITALIST

## 2022-09-27 PROCEDURE — 36415 COLL VENOUS BLD VENIPUNCTURE: CPT

## 2022-09-27 PROCEDURE — A9270 NON-COVERED ITEM OR SERVICE: HCPCS | Performed by: PHYSICAL MEDICINE & REHABILITATION

## 2022-09-27 PROCEDURE — 84100 ASSAY OF PHOSPHORUS: CPT

## 2022-09-27 PROCEDURE — 700102 HCHG RX REV CODE 250 W/ 637 OVERRIDE(OP): Performed by: INTERNAL MEDICINE

## 2022-09-27 PROCEDURE — 83735 ASSAY OF MAGNESIUM: CPT

## 2022-09-27 PROCEDURE — A9270 NON-COVERED ITEM OR SERVICE: HCPCS | Performed by: INTERNAL MEDICINE

## 2022-09-27 PROCEDURE — 82330 ASSAY OF CALCIUM: CPT

## 2022-09-27 RX ORDER — LEVETIRACETAM 500 MG/1
500 TABLET ORAL 2 TIMES DAILY
Qty: 60 TABLET | Status: SHIPPED
Start: 2022-09-27

## 2022-09-27 RX ORDER — POTASSIUM CHLORIDE 20 MEQ/1
40 TABLET, EXTENDED RELEASE ORAL ONCE
Status: COMPLETED | OUTPATIENT
Start: 2022-09-27 | End: 2022-09-27

## 2022-09-27 RX ORDER — CHOLECALCIFEROL (VITAMIN D3) 125 MCG
5 CAPSULE ORAL NIGHTLY
Qty: 30 TABLET | Status: SHIPPED
Start: 2022-09-27

## 2022-09-27 RX ORDER — LANOLIN ALCOHOL/MO/W.PET/CERES
100 CREAM (GRAM) TOPICAL DAILY
Qty: 30 TABLET | Status: SHIPPED
Start: 2022-09-28

## 2022-09-27 RX ORDER — MAGNESIUM SULFATE HEPTAHYDRATE 40 MG/ML
2 INJECTION, SOLUTION INTRAVENOUS ONCE
Status: DISCONTINUED | OUTPATIENT
Start: 2022-09-27 | End: 2022-09-27

## 2022-09-27 RX ORDER — GABAPENTIN 100 MG/1
300 CAPSULE ORAL 3 TIMES DAILY
Qty: 90 CAPSULE | Status: SHIPPED
Start: 2022-09-27

## 2022-09-27 RX ADMIN — POTASSIUM CHLORIDE 40 MEQ: 1500 TABLET, EXTENDED RELEASE ORAL at 11:27

## 2022-09-27 RX ADMIN — LEVETIRACETAM 500 MG: 500 TABLET, FILM COATED ORAL at 06:37

## 2022-09-27 RX ADMIN — INSULIN HUMAN 3 UNITS: 100 INJECTION, SOLUTION PARENTERAL at 11:55

## 2022-09-27 RX ADMIN — GABAPENTIN 300 MG: 300 CAPSULE ORAL at 06:37

## 2022-09-27 RX ADMIN — DOCUSATE SODIUM 50 MG AND SENNOSIDES 8.6 MG 2 TABLET: 8.6; 5 TABLET, FILM COATED ORAL at 06:36

## 2022-09-27 RX ADMIN — GABAPENTIN 300 MG: 300 CAPSULE ORAL at 11:27

## 2022-09-27 RX ADMIN — AMLODIPINE BESYLATE 10 MG: 10 TABLET ORAL at 06:37

## 2022-09-27 RX ADMIN — LIDOCAINE 1 PATCH: 50 PATCH CUTANEOUS at 02:42

## 2022-09-27 NOTE — DISCHARGE PLANNING
Case Management Discharge Planning    Admission Date: 9/12/2022  GMLOS: 6.6  ALOS: 15    6-Clicks ADL Score: 18  6-Clicks Mobility Score: 13  PT and/or OT Eval ordered: Yes  Post-acute Referrals Ordered: Yes  Post-acute Choice Obtained: Yes  Has referral(s) been sent to post-acute provider:  Yes      Anticipated Discharge Dispo: Discharge Disposition: Disch to  rehab facility or distinct part unit ()    DME Needed: No    Action(s) Taken: Transport Arranged     Escalations Completed: None    Medically Clear: Yes    Next Steps: Plan for transfer today to [facility]. Transportation request for 1300 , confirmation pending.     Barriers to Discharge: Transportation      12:25 Addendum: Pickup time confirmed 1300 with [facility].

## 2022-09-27 NOTE — PROGRESS NOTES
Patient left unit and discharged to acute rehab center . Patient family made aware. Patient is on room air, no respiratory distress noted. IV acces removed. All belongings with patient. Staples still in head, facility knows expected date to take them out is tomorrow , 9/28.

## 2022-09-27 NOTE — DISCHARGE SUMMARY
Discharge Summary    CHIEF COMPLAINT ON ADMISSION  Chief Complaint   Patient presents with    Trauma Green       Reason for Admission  traumatic subdural    Admission Date  9/12/2022     CODE STATUS  Full Code    HPI & HOSPITAL COURSE  81 y/o M with CAD s/p stent, primary HTN, BPH, diabetes type II who presented 9/12/2022 to the ED at [facility] as a transfer from outside facility with a chief complaint of headache, right shoulder and hip pain after falling off of his toilet 2 days prior.  He denied any LOC, chest pain or shortness of breath. He does state that he has had chronic right-sided weakness, as well as decreased sensation to his right side for the past year or so that has not improved or worsened.  Shoulder and hip films at outlying facility were found to be negative for any fractures.  CT of head showed a 2.1 cm subdural hematoma in the right frontal area with a 1 mm right to left midline shift, therefore he was transferred to [facility] for neurosurgical intervention and critical care management.     Neurosurgery evaluated the patient - given the size of hematoma, evacuation was indicated. Pt underwent Right frontal parietal temporal craniotomy, evacuation acute, subacute subdural hematoma and resection subdural membrane on 9/14. He was monitored in ICU post op with q1hr neuro check, symptom management and monitoring of HVAC and EVD. Subdural drain removed 9/17/2022  Hemovac removed 9/16/2022. Patient transferred to neuro floor on 9/17.  Patient had pneumocephalus and was on nonrebreather for his pneumocephalus.  He had leukocytosis with no clear source of infection and his WBC is trending down.  Repeat head CT on 9/17 was stable  Neurosurgery signed off on 9/20/2022 recommended keeping scalp incision clean and dry and staple removal on 9/28/2022 with an outpatient follow-up with Dr. Gleason    His potassium and magnesium are being replaced this morning    Patient is medically stable to transfer  to rehab      Therefore, he is discharged in good and stable condition to home with close outpatient follow-up.    The patient met 2-midnight criteria for an inpatient stay at the time of discharge.      FOLLOW UP ITEMS POST DISCHARGE  Keep scalp incision clean and dry. Shower, wash with soap/water, pat dry, leave open to air  Staple removal POD #14, 9/28/2022    Monitor electrolytes and replete accordingly      DISCHARGE DIAGNOSES  Principal Problem:    Subdural hematoma (HCC) POA: Yes  Active Problems:    Type 2 diabetes mellitus (HCC) POA: Yes    Primary hypertension POA: Yes    Electrolyte abnormality POA: Yes    Severe malnutrition (HCC) POA: Yes    Pneumocephalus POA: Unknown    Coronary artery disease involving native coronary artery of native heart without angina pectoris POA: Yes    Leukocytosis POA: Unknown    Encephalopathy POA: Unknown  Resolved Problems:    * No resolved hospital problems. *      FOLLOW UP  No future appointments.  [facility] - [address] [address]  [phone]          MEDICATIONS ON DISCHARGE     Medication List        START taking these medications        Instructions   insulin GLARGINE 100 UNIT/ML Soln  Commonly known as: Lantus,Semglee   Inject 7 Units under the skin every evening.  Dose: 7 Units     insulin regular 100 Unit/mL Soln  Commonly known as: HumuLIN R   Inject 2-9 Units under the skin 4 Times a Day,Before Meals and at Bedtime.  Dose: 2-9 Units     levETIRAcetam 500 MG Tabs  Commonly known as: KEPPRA   Take 1 Tablet by mouth 2 times a day.  Dose: 500 mg     melatonin 5 mg Tabs   Take 1 Tablet by mouth every evening.  Dose: 5 mg     multivitamin Tabs  Start taking on: September 28, 2022   Take 1 Tablet by mouth every day.  Dose: 1 Tablet     thiamine 100 MG tablet  Start taking on: September 28, 2022  Commonly known as: THIAMINE   Take 1 Tablet by mouth every day.  Dose: 100 mg            CHANGE how you take these  medications        Instructions   gabapentin 100 MG Caps  What changed: how much to take  Commonly known as: NEURONTIN   Take 3 Capsules by mouth 3 times a day.  Dose: 300 mg            CONTINUE taking these medications        Instructions   amLODIPine 10 MG Tabs  Commonly known as: NORVASC   Take 10 mg by mouth every day.  Dose: 10 mg     atorvastatin 40 MG Tabs  Commonly known as: LIPITOR   Take 40 mg by mouth every evening.  Dose: 40 mg     zonisamide 50 MG capsule  Commonly known as: ZONEGRAN   Take 100 mg by mouth at bedtime.  Dose: 100 mg            STOP taking these medications      aspirin EC 81 MG Tbec  Commonly known as: ECOTRIN              Allergies  No Known Allergies    DIET  Orders Placed This Encounter   Procedures    Diet Order Diet: Level 6 - Soft and Bite Sized (Meds as tolerated); Liquid level: Level 0 - Thin; Second Modifier: (optional): Consistent CHO (Diabetic)     Standing Status:   Standing     Number of Occurrences:   1     Order Specific Question:   Diet:     Answer:   Level 6 - Soft and Bite Sized [23]     Comments:   Meds as tolerated     Order Specific Question:   Liquid level     Answer:   Level 0 - Thin     Order Specific Question:   Second Modifier: (optional)     Answer:   Consistent CHO (Diabetic) [4]       ACTIVITY  As tolerated.  Weight bearing as tolerated    LINES, DRAINS, AND WOUNDS  This is an automated list. Peripheral IVs will be removed prior to discharge.  Peripheral IV 09/19/22 20 G Anterior;Distal;Left Forearm (Active)   Site Assessment Clean;Dry;Intact 09/26/22 2000   Dressing Type Occlusive;Transparent 09/26/22 2000   Line Status Saline locked;Scrubbed the hub prior to access;No blood return;Flushed 09/26/22 2000   Dressing Status Clean;Dry;Intact 09/26/22 2000   Dressing Intervention N/A 09/26/22 2000   Dressing Change Due 10/01/22 09/24/22 1600   Infiltration Grading ([facility] [place] 0 09/26/22 2000   Phlebitis Scale ([facility] Only) 0 09/26/22 2000       Wound 09/14/22  Incision Head Right (Active)   Site Assessment Clean;Dry;Intact 09/26/22 2000   Periwound Assessment Clean;Dry;Intact 09/26/22 2000   Margins Attached edges 09/26/22 2000   Closure Staples 09/26/22 2000   Drainage Amount None 09/26/22 0745   Drainage Description EMMANUEL 09/15/22 2000   Treatments Offloading 09/26/22 2000   Wound Cleansing Not Applicable 09/24/22 2000   Periwound Protectant Not Applicable 09/24/22 2000   Dressing Cleansing/Solutions Not Applicable 09/24/22 2000   Dressing Options Open to Air 09/26/22 2000   Dressing Changed Observed 09/25/22 2000   Dressing Status Open to Air 09/26/22 2000       Peripheral IV 09/19/22 20 G Anterior;Distal;Left Forearm (Active)   Site Assessment Clean;Dry;Intact 09/26/22 2000   Dressing Type Occlusive;Transparent 09/26/22 2000   Line Status Saline locked;Scrubbed the hub prior to access;No blood return;Flushed 09/26/22 2000   Dressing Status Clean;Dry;Intact 09/26/22 2000   Dressing Intervention N/A 09/26/22 2000   Dressing Change Due 10/01/22 09/24/22 1600   Infiltration Grading ([facility], [facility] [place] [place]) 0 09/26/22 2000   Phlebitis Scale ([facility] Only) 0 09/26/22 2000               MENTAL STATUS ON TRANSFER             CONSULTATIONS  Neurosurgery and critical care    PROCEDURES    PROCEDURE:     1.  Right frontal parietal temporal craniotomy, evacuation acute, subacute subdural hematoma  2.  Resection subdural membrane  3.  Dural augmentation (Dura repair, [employer])  4.  Subdural drain placement  5.  Modifier 22: Significant increase in surgical time and technical difficulty given requirement for resection of extensive subdural membrane.  This required microsurgical dissection, increasing the surgical time by over 2 times expected time for subdural hematoma evacuation    LABORATORY  Lab Results   Component Value Date    SODIUM 136 09/27/2022    POTASSIUM 3.5 (L) 09/27/2022    CHLORIDE 101 09/27/2022    CO2 23 09/27/2022    GLUCOSE 142 (H) 09/27/2022    BUN 14 09/27/2022     CREATININE 0.83 09/27/2022        Lab Results   Component Value Date    WBC 12.0 (H) 09/27/2022    HEMOGLOBIN 11.8 (L) 09/27/2022    HEMATOCRIT 35.2 (L) 09/27/2022    PLATELETCT 591 (H) 09/27/2022        Total time of the discharge process exceeds 35 minutes.

## 2022-09-27 NOTE — DISCHARGE PLANNING
Agency/Facility Name: [facility]  Spoke To: Lauren  Outcome: DPA was notified insurance auth has been processed. Lauren requesting Pt to transfer today, 9/27 as bed is available. DPA to check with RN CM and follow up.     RN CM notified.     1045:  Agency/Facility Name: [facility]  Outcome: DPA left a voicemail for Lauren inquiring what time they prefer Pt to be transported. DPA requesting a call  back.     1140:  Agency/Facility Name: [facility]  Spoke To: Lauren  Outcome: Lauren states they can accept Pt as soon as possible and would need Pt sooner rather than later so they can start him on 3 hours of therapy for today. DPA to call back with confirmed time.     1227:  Agency/Facility Name: [facility]  Spoke To: Lauren  Outcome: DPA left a voicemail for Lauren regarding confirmed transport time for today, 9/27. DPA requesting a call back.     1235:  Agency/Facility Name: [facility]  Spoke To: Lauren  Outcome: DPA confirmed a transport time of 1300 for today, 9/27, via [facility]. DPA provided number for report to bedside RN.

## 2022-09-27 NOTE — PROGRESS NOTES
Patient being discharged to [facility] acute rehab today, planned  for 1300 confirmed. Patient and family aware. Report given to facility GRETCHEN Tran.     Patient going with staples still in head. Facility knows they are expected to be removed tomorrow, 9/28

## 2022-09-27 NOTE — CARE PLAN
The patient is Watcher - Medium risk of patient condition declining or worsening    Shift Goals  Clinical Goals: skin integrity, neuro exams  Patient Goals: discharge  Family Goals: fei    Progress made toward(s) clinical / shift goals:    Problem: Skin Integrity  Goal: Skin integrity is maintained or improved  Outcome: Progressing  Q2 turns, barrier cream     Problem: Neuro Status  Goal: Neuro status will remain stable or improve  Outcome: Progressing  Remains unchanged will continue to monitor        Patient is not progressing towards the following goals:

## 2022-09-27 NOTE — CARE PLAN
The patient is Stable - Low risk of patient condition declining or worsening    Shift Goals  Clinical Goals: q4hr neuro, safety, comfortable  Patient Goals: discharge  Family Goals: discharge to facillity, safety    Progress made toward(s) clinical / shift goals:  patient is discharged to [facility] rehab [place]. Patient safety will remain high priority. Patient left with all belongings.     Patient is not progressing towards the following goals:

## 2022-09-27 NOTE — DISCHARGE PLANNING
DC Transport Scheduled    Received request at: 1146    Transport Company Scheduled:  [employer]    Scheduled Date: 9/27/2022  Scheduled Time: 1300    Destination: [facility]    Notified care team of scheduled transport via Vo[employer].     If there are any changes needed to the DC transportation scheduled, please contact [facility] at ext. [phone] between the hours of 0652-3525 Mon-Fri. If outside those hours, contact the ED Case Manager at ext. [phone].

## 2022-10-10 NOTE — DOCUMENTATION QUERY
"                                                                         Critical access hospital                                                                       Query Response Note      PATIENT:               DAVID DE JESUS  ACCT #:                  [id number]  MRN:                     [id number]  :                      1940  ADMIT DATE:       2022 2:09 PM  DISCH DATE:        2022 1:20 PM  RESPONDING  PROVIDER #:        [id number]           QUERY TEXT:    \"Pneumocephalus post right craniotomy\" is documented in the Progress Notes.    Please clarify the term ?post operative? related to the documented condition in the Medical Record.      NOTE:  If an appropriate response is not listed below, please respond with a new note.                Documentation indicators that raised basis for question:   Critical Care Progress Note 22 Dr. DUSTIN Taylor  Interval Problem Update  -POD #2  right craniotomy with evacuation of subdural hematoma and resection of subdural membrane.  -CT head shows pneumocephalus- will continue another 24 hr  100% NRB     Pneumocephalus  Assessment & Plan  Pneumocephalus post right craniotomy with evacuation of subdural hematoma and resection of subdural membrane  CT head postop showed pneumocephalus-we will continue oxygen augmentation to decrease pulmonary nitrogen levels which will create a nitrogen gradient causing nitrogen pneumocephalus to diffuse into the lungs via blood.      CT HEAD 22 Dr. DES Cramer  9/15/2022 3:55 AM   HISTORY/REASON FOR EXAM:  Subdural hematoma; s/p membrane stripping and evacuation of right hematoma.   FINDINGS:   Postsurgical changes right frontal parietal temporal craniotomy status post subdural hematoma evacuation. There is subdural drain in place. Subdural hematoma and air collection remains with the hematoma posteriorly measuring up to 2 cm.    Impression:   Postsurgical changes status post right-sided subdural hematoma evacuation with residual " subdural hematoma and air and a subdural drain in place.     Discharge Summary 9/27/22 Dr. LENORA Juarez  HPI & HOSPITAL COURSE:  Patient had pneumocephalus and was on nonrebreather for his pneumocephalus.      Treatment: oxygen augmentation  Risk Factors: Craniotomy with evacuation of hematoma, encephalopathy, hx of chronic lacunar infarctions    Laisha Rose  Associate  - Inpatient  [email or web address]  Options provided:   -- Pneumocephalus is unexpected and a complication of the procedure performed   -- Pneumocephalus is not a complication due to or associated with the procedure   -- Pneumocephalus is routinely expected and integral/inherent to the procedure performed   -- Pneumocephalus is related to another etiology, (please document underlying etiology)   -- Unable to determine      Query created by: Laisha Rose on 10/5/2022 1:08 PM    RESPONSE TEXT:    Pneumocephalus is routinely expected and integral/inherent to the procedure performed          Electronically signed by:  LALIT JUAREZ MD 10/10/2022 12:32 PM

## 2023-04-20 NOTE — DISCHARGE PLANNING
[facility] Transitional Care Coordination    Referral from: Dr Taylor    Insurance Provider on Facesheet: [employer]  Potential Rehab Diagnosis: TBI    Chart review indicates patient may need on going medical management and may have therapy needs to possibly meet inpatient rehab facility criteria with the goal of returning to community.    D/C support: Adult son     Physiatry consultation forwarded per protocol.     TBI/SDH - physiatry to consult. Patient has [employer] insurance which is not in network with [facility], recommend referring to [facility] for IPR.     Thank you for the referral.        negative...

## 2023-06-19 ENCOUNTER — APPOINTMENT (OUTPATIENT)
Dept: URGENT CARE | Facility: PHYSICIAN GROUP | Age: 83
End: 2023-06-19
Payer: MEDICARE

## 2024-10-21 ENCOUNTER — HOSPITAL ENCOUNTER (INPATIENT)
Facility: MEDICAL CENTER | Age: 84
LOS: 4 days | DRG: 474 | End: 2024-10-25
Attending: EMERGENCY MEDICINE | Admitting: STUDENT IN AN ORGANIZED HEALTH CARE EDUCATION/TRAINING PROGRAM
Payer: MEDICARE

## 2024-10-21 DIAGNOSIS — E11.00 TYPE 2 DIABETES MELLITUS WITH HYPEROSMOLARITY WITHOUT COMA, WITHOUT LONG-TERM CURRENT USE OF INSULIN (HCC): ICD-10-CM

## 2024-10-21 DIAGNOSIS — E11.628 DIABETIC FOOT INFECTION (HCC): ICD-10-CM

## 2024-10-21 DIAGNOSIS — N40.1 BENIGN PROSTATIC HYPERPLASIA WITH INCOMPLETE BLADDER EMPTYING: ICD-10-CM

## 2024-10-21 DIAGNOSIS — R39.14 BENIGN PROSTATIC HYPERPLASIA WITH INCOMPLETE BLADDER EMPTYING: ICD-10-CM

## 2024-10-21 DIAGNOSIS — L08.9 DIABETIC FOOT INFECTION (HCC): ICD-10-CM

## 2024-10-21 DIAGNOSIS — I10 PRIMARY HYPERTENSION: ICD-10-CM

## 2024-10-21 PROBLEM — R41.89 COGNITIVE AND BEHAVIORAL CHANGES: Status: ACTIVE | Noted: 2024-10-21

## 2024-10-21 PROBLEM — R46.89 COGNITIVE AND BEHAVIORAL CHANGES: Status: ACTIVE | Noted: 2024-10-21

## 2024-10-21 LAB
ALBUMIN SERPL BCP-MCNC: 3.4 G/DL (ref 3.2–4.9)
ALBUMIN/GLOB SERPL: 1 G/DL
ALP SERPL-CCNC: 79 U/L (ref 30–99)
ALT SERPL-CCNC: 14 U/L (ref 2–50)
ANION GAP SERPL CALC-SCNC: 10 MMOL/L (ref 7–16)
AST SERPL-CCNC: 19 U/L (ref 12–45)
BASOPHILS # BLD AUTO: 1.4 % (ref 0–1.8)
BASOPHILS # BLD: 0.11 K/UL (ref 0–0.12)
BILIRUB SERPL-MCNC: 0.3 MG/DL (ref 0.1–1.5)
BUN SERPL-MCNC: 14 MG/DL (ref 8–22)
CALCIUM ALBUM COR SERPL-MCNC: 9.4 MG/DL (ref 8.5–10.5)
CALCIUM SERPL-MCNC: 8.9 MG/DL (ref 8.5–10.5)
CHLORIDE SERPL-SCNC: 106 MMOL/L (ref 96–112)
CO2 SERPL-SCNC: 22 MMOL/L (ref 20–33)
CREAT SERPL-MCNC: 1.09 MG/DL (ref 0.5–1.4)
CRP SERPL HS-MCNC: 0.73 MG/DL (ref 0–0.75)
EOSINOPHIL # BLD AUTO: 0.35 K/UL (ref 0–0.51)
EOSINOPHIL NFR BLD: 4.5 % (ref 0–6.9)
ERYTHROCYTE [DISTWIDTH] IN BLOOD BY AUTOMATED COUNT: 45.1 FL (ref 35.9–50)
ERYTHROCYTE [SEDIMENTATION RATE] IN BLOOD BY WESTERGREN METHOD: 37 MM/HOUR (ref 0–20)
EST. AVERAGE GLUCOSE BLD GHB EST-MCNC: 194 MG/DL
GFR SERPLBLD CREATININE-BSD FMLA CKD-EPI: 68 ML/MIN/1.73 M 2
GLOBULIN SER CALC-MCNC: 3.5 G/DL (ref 1.9–3.5)
GLUCOSE SERPL-MCNC: 130 MG/DL (ref 65–99)
HBA1C MFR BLD: 8.4 % (ref 4–5.6)
HCT VFR BLD AUTO: 32.6 % (ref 42–52)
HGB BLD-MCNC: 10.2 G/DL (ref 14–18)
IMM GRANULOCYTES # BLD AUTO: 0.04 K/UL (ref 0–0.11)
IMM GRANULOCYTES NFR BLD AUTO: 0.5 % (ref 0–0.9)
LYMPHOCYTES # BLD AUTO: 2.43 K/UL (ref 1–4.8)
LYMPHOCYTES NFR BLD: 31.6 % (ref 22–41)
MCH RBC QN AUTO: 29.1 PG (ref 27–33)
MCHC RBC AUTO-ENTMCNC: 31.3 G/DL (ref 32.3–36.5)
MCV RBC AUTO: 93.1 FL (ref 81.4–97.8)
MONOCYTES # BLD AUTO: 0.64 K/UL (ref 0–0.85)
MONOCYTES NFR BLD AUTO: 8.3 % (ref 0–13.4)
NEUTROPHILS # BLD AUTO: 4.13 K/UL (ref 1.82–7.42)
NEUTROPHILS NFR BLD: 53.7 % (ref 44–72)
NRBC # BLD AUTO: 0 K/UL
NRBC BLD-RTO: 0 /100 WBC (ref 0–0.2)
PLATELET # BLD AUTO: 287 K/UL (ref 164–446)
PMV BLD AUTO: 10.7 FL (ref 9–12.9)
POTASSIUM SERPL-SCNC: 4.7 MMOL/L (ref 3.6–5.5)
PROT SERPL-MCNC: 6.9 G/DL (ref 6–8.2)
RBC # BLD AUTO: 3.5 M/UL (ref 4.7–6.1)
SODIUM SERPL-SCNC: 138 MMOL/L (ref 135–145)
WBC # BLD AUTO: 7.7 K/UL (ref 4.8–10.8)

## 2024-10-21 PROCEDURE — 99285 EMERGENCY DEPT VISIT HI MDM: CPT

## 2024-10-21 PROCEDURE — 770006 HCHG ROOM/CARE - MED/SURG/GYN SEMI*

## 2024-10-21 PROCEDURE — 700102 HCHG RX REV CODE 250 W/ 637 OVERRIDE(OP): Performed by: STUDENT IN AN ORGANIZED HEALTH CARE EDUCATION/TRAINING PROGRAM

## 2024-10-21 PROCEDURE — A9270 NON-COVERED ITEM OR SERVICE: HCPCS

## 2024-10-21 PROCEDURE — 700102 HCHG RX REV CODE 250 W/ 637 OVERRIDE(OP)

## 2024-10-21 PROCEDURE — 99223 1ST HOSP IP/OBS HIGH 75: CPT | Mod: AI | Performed by: STUDENT IN AN ORGANIZED HEALTH CARE EDUCATION/TRAINING PROGRAM

## 2024-10-21 PROCEDURE — 36415 COLL VENOUS BLD VENIPUNCTURE: CPT

## 2024-10-21 PROCEDURE — 700101 HCHG RX REV CODE 250: Performed by: STUDENT IN AN ORGANIZED HEALTH CARE EDUCATION/TRAINING PROGRAM

## 2024-10-21 PROCEDURE — 700105 HCHG RX REV CODE 258: Performed by: STUDENT IN AN ORGANIZED HEALTH CARE EDUCATION/TRAINING PROGRAM

## 2024-10-21 PROCEDURE — 700111 HCHG RX REV CODE 636 W/ 250 OVERRIDE (IP): Mod: JZ | Performed by: STUDENT IN AN ORGANIZED HEALTH CARE EDUCATION/TRAINING PROGRAM

## 2024-10-21 PROCEDURE — 85025 COMPLETE CBC W/AUTO DIFF WBC: CPT

## 2024-10-21 PROCEDURE — A9270 NON-COVERED ITEM OR SERVICE: HCPCS | Performed by: STUDENT IN AN ORGANIZED HEALTH CARE EDUCATION/TRAINING PROGRAM

## 2024-10-21 PROCEDURE — 85652 RBC SED RATE AUTOMATED: CPT

## 2024-10-21 PROCEDURE — 80053 COMPREHEN METABOLIC PANEL: CPT

## 2024-10-21 PROCEDURE — 700111 HCHG RX REV CODE 636 W/ 250 OVERRIDE (IP): Mod: JZ

## 2024-10-21 PROCEDURE — 700105 HCHG RX REV CODE 258: Performed by: EMERGENCY MEDICINE

## 2024-10-21 PROCEDURE — 96365 THER/PROPH/DIAG IV INF INIT: CPT

## 2024-10-21 PROCEDURE — 83036 HEMOGLOBIN GLYCOSYLATED A1C: CPT

## 2024-10-21 PROCEDURE — 700111 HCHG RX REV CODE 636 W/ 250 OVERRIDE (IP): Mod: JZ | Performed by: EMERGENCY MEDICINE

## 2024-10-21 PROCEDURE — 51798 US URINE CAPACITY MEASURE: CPT

## 2024-10-21 PROCEDURE — 86140 C-REACTIVE PROTEIN: CPT

## 2024-10-21 RX ORDER — ACETAMINOPHEN 325 MG/1
650 TABLET ORAL EVERY 6 HOURS PRN
Status: DISCONTINUED | OUTPATIENT
Start: 2024-10-21 | End: 2024-10-25 | Stop reason: HOSPADM

## 2024-10-21 RX ORDER — SODIUM HYPOCHLORITE 1.25 MG/ML
SOLUTION TOPICAL 2 TIMES DAILY
Status: DISCONTINUED | OUTPATIENT
Start: 2024-10-21 | End: 2024-10-22 | Stop reason: ALTCHOICE

## 2024-10-21 RX ORDER — HYDROXYZINE HYDROCHLORIDE 50 MG/1
25 TABLET, FILM COATED ORAL 3 TIMES DAILY PRN
Status: DISCONTINUED | OUTPATIENT
Start: 2024-10-21 | End: 2024-10-25 | Stop reason: HOSPADM

## 2024-10-21 RX ORDER — HEPARIN SODIUM 5000 [USP'U]/ML
5000 INJECTION, SOLUTION INTRAVENOUS; SUBCUTANEOUS EVERY 8 HOURS
Status: DISCONTINUED | OUTPATIENT
Start: 2024-10-21 | End: 2024-10-25 | Stop reason: HOSPADM

## 2024-10-21 RX ORDER — HYDRALAZINE HYDROCHLORIDE 20 MG/ML
10 INJECTION INTRAMUSCULAR; INTRAVENOUS EVERY 4 HOURS PRN
Status: DISCONTINUED | OUTPATIENT
Start: 2024-10-21 | End: 2024-10-25 | Stop reason: HOSPADM

## 2024-10-21 RX ORDER — CLINDAMYCIN HYDROCHLORIDE 300 MG/1
300 CAPSULE ORAL 3 TIMES DAILY
Status: ON HOLD | COMMUNITY
Start: 2024-10-18 | End: 2024-10-25

## 2024-10-21 RX ORDER — TAMSULOSIN HYDROCHLORIDE 0.4 MG/1
0.4 CAPSULE ORAL
Status: DISCONTINUED | OUTPATIENT
Start: 2024-10-21 | End: 2024-10-25 | Stop reason: HOSPADM

## 2024-10-21 RX ADMIN — AMPICILLIN AND SULBACTAM 3 G: 1; 2 INJECTION, POWDER, FOR SOLUTION INTRAMUSCULAR; INTRAVENOUS at 14:34

## 2024-10-21 RX ADMIN — HYDROXYZINE HYDROCHLORIDE 25 MG: 50 TABLET, FILM COATED ORAL at 21:49

## 2024-10-21 RX ADMIN — ACETAMINOPHEN 650 MG: 325 TABLET ORAL at 22:20

## 2024-10-21 RX ADMIN — TAMSULOSIN HYDROCHLORIDE 0.4 MG: 0.4 CAPSULE ORAL at 18:03

## 2024-10-21 RX ADMIN — AMPICILLIN AND SULBACTAM 3 G: 1; 2 INJECTION, POWDER, FOR SOLUTION INTRAMUSCULAR; INTRAVENOUS at 17:57

## 2024-10-21 RX ADMIN — SODIUM HYPOCHLORITE 10 ML: 1.25 SOLUTION TOPICAL at 17:58

## 2024-10-21 RX ADMIN — HYDRALAZINE HYDROCHLORIDE 10 MG: 20 INJECTION, SOLUTION INTRAMUSCULAR; INTRAVENOUS at 20:32

## 2024-10-21 SDOH — ECONOMIC STABILITY: TRANSPORTATION INSECURITY
IN THE PAST 12 MONTHS, HAS LACK OF RELIABLE TRANSPORTATION KEPT YOU FROM MEDICAL APPOINTMENTS, MEETINGS, WORK OR FROM GETTING THINGS NEEDED FOR DAILY LIVING?: NO

## 2024-10-21 SDOH — ECONOMIC STABILITY: TRANSPORTATION INSECURITY
IN THE PAST 12 MONTHS, HAS THE LACK OF TRANSPORTATION KEPT YOU FROM MEDICAL APPOINTMENTS OR FROM GETTING MEDICATIONS?: NO

## 2024-10-21 ASSESSMENT — COGNITIVE AND FUNCTIONAL STATUS - GENERAL
MOVING FROM LYING ON BACK TO SITTING ON SIDE OF FLAT BED: A LITTLE
HELP NEEDED FOR BATHING: A LOT
MOBILITY SCORE: 16
STANDING UP FROM CHAIR USING ARMS: A LITTLE
SUGGESTED CMS G CODE MODIFIER MOBILITY: CK
TOILETING: A LOT
EATING MEALS: A LITTLE
WALKING IN HOSPITAL ROOM: A LOT
TURNING FROM BACK TO SIDE WHILE IN FLAT BAD: A LITTLE
MOVING TO AND FROM BED TO CHAIR: A LITTLE
SUGGESTED CMS G CODE MODIFIER DAILY ACTIVITY: CK
PERSONAL GROOMING: A LITTLE
DAILY ACTIVITIY SCORE: 15
CLIMB 3 TO 5 STEPS WITH RAILING: A LOT
DRESSING REGULAR UPPER BODY CLOTHING: A LITTLE
DRESSING REGULAR LOWER BODY CLOTHING: A LOT

## 2024-10-21 ASSESSMENT — PATIENT HEALTH QUESTIONNAIRE - PHQ9
2. FEELING DOWN, DEPRESSED, IRRITABLE, OR HOPELESS: NOT AT ALL
1. LITTLE INTEREST OR PLEASURE IN DOING THINGS: NOT AT ALL
SUM OF ALL RESPONSES TO PHQ9 QUESTIONS 1 AND 2: 0

## 2024-10-21 ASSESSMENT — LIFESTYLE VARIABLES
EVER FELT BAD OR GUILTY ABOUT YOUR DRINKING: NO
TOTAL SCORE: 0
TOTAL SCORE: 0
ALCOHOL_USE: NO
ON A TYPICAL DAY WHEN YOU DRINK ALCOHOL HOW MANY DRINKS DO YOU HAVE: 0
HOW MANY TIMES IN THE PAST YEAR HAVE YOU HAD 5 OR MORE DRINKS IN A DAY: 0
EVER HAD A DRINK FIRST THING IN THE MORNING TO STEADY YOUR NERVES TO GET RID OF A HANGOVER: NO
CONSUMPTION TOTAL: NEGATIVE
AVERAGE NUMBER OF DAYS PER WEEK YOU HAVE A DRINK CONTAINING ALCOHOL: 0
HAVE PEOPLE ANNOYED YOU BY CRITICIZING YOUR DRINKING: NO
TOTAL SCORE: 0
HAVE YOU EVER FELT YOU SHOULD CUT DOWN ON YOUR DRINKING: NO

## 2024-10-21 ASSESSMENT — SOCIAL DETERMINANTS OF HEALTH (SDOH)
WITHIN THE PAST 12 MONTHS, YOU WORRIED THAT YOUR FOOD WOULD RUN OUT BEFORE YOU GOT THE MONEY TO BUY MORE: NEVER TRUE
IN THE PAST 12 MONTHS, HAS THE ELECTRIC, GAS, OIL, OR WATER COMPANY THREATENED TO SHUT OFF SERVICE IN YOUR HOME?: NO
WITHIN THE PAST 12 MONTHS, THE FOOD YOU BOUGHT JUST DIDN'T LAST AND YOU DIDN'T HAVE MONEY TO GET MORE: NEVER TRUE
WITHIN THE LAST YEAR, HAVE YOU BEEN HUMILIATED OR EMOTIONALLY ABUSED IN OTHER WAYS BY YOUR PARTNER OR EX-PARTNER?: NO
WITHIN THE LAST YEAR, HAVE YOU BEEN KICKED, HIT, SLAPPED, OR OTHERWISE PHYSICALLY HURT BY YOUR PARTNER OR EX-PARTNER?: NO
WITHIN THE LAST YEAR, HAVE YOU BEEN AFRAID OF YOUR PARTNER OR EX-PARTNER?: NO
WITHIN THE LAST YEAR, HAVE TO BEEN RAPED OR FORCED TO HAVE ANY KIND OF SEXUAL ACTIVITY BY YOUR PARTNER OR EX-PARTNER?: NO

## 2024-10-21 ASSESSMENT — ENCOUNTER SYMPTOMS
NAUSEA: 0
ABDOMINAL PAIN: 0
FEVER: 0
VOMITING: 0
SHORTNESS OF BREATH: 0

## 2024-10-21 ASSESSMENT — PAIN DESCRIPTION - PAIN TYPE
TYPE: ACUTE PAIN
TYPE: ACUTE PAIN

## 2024-10-22 ENCOUNTER — ANESTHESIA (OUTPATIENT)
Dept: SURGERY | Facility: MEDICAL CENTER | Age: 84
DRG: 474 | End: 2024-10-22
Payer: MEDICARE

## 2024-10-22 ENCOUNTER — ANESTHESIA EVENT (OUTPATIENT)
Dept: SURGERY | Facility: MEDICAL CENTER | Age: 84
DRG: 474 | End: 2024-10-22
Payer: MEDICARE

## 2024-10-22 LAB
ANION GAP SERPL CALC-SCNC: 12 MMOL/L (ref 7–16)
BUN SERPL-MCNC: 13 MG/DL (ref 8–22)
CALCIUM SERPL-MCNC: 9.5 MG/DL (ref 8.5–10.5)
CHLORIDE SERPL-SCNC: 106 MMOL/L (ref 96–112)
CO2 SERPL-SCNC: 22 MMOL/L (ref 20–33)
CREAT SERPL-MCNC: 1.07 MG/DL (ref 0.5–1.4)
EKG IMPRESSION: NORMAL
ERYTHROCYTE [DISTWIDTH] IN BLOOD BY AUTOMATED COUNT: 44.1 FL (ref 35.9–50)
GFR SERPLBLD CREATININE-BSD FMLA CKD-EPI: 69 ML/MIN/1.73 M 2
GLUCOSE BLD STRIP.AUTO-MCNC: 136 MG/DL (ref 65–99)
GLUCOSE BLD STRIP.AUTO-MCNC: 137 MG/DL (ref 65–99)
GLUCOSE SERPL-MCNC: 164 MG/DL (ref 65–99)
GRAM STN SPEC: NORMAL
HCT VFR BLD AUTO: 37.5 % (ref 42–52)
HGB BLD-MCNC: 11.9 G/DL (ref 14–18)
MCH RBC QN AUTO: 28.9 PG (ref 27–33)
MCHC RBC AUTO-ENTMCNC: 31.7 G/DL (ref 32.3–36.5)
MCV RBC AUTO: 91 FL (ref 81.4–97.8)
PATHOLOGY CONSULT NOTE: NORMAL
PLATELET # BLD AUTO: 317 K/UL (ref 164–446)
PMV BLD AUTO: 10.5 FL (ref 9–12.9)
POTASSIUM SERPL-SCNC: 4.2 MMOL/L (ref 3.6–5.5)
RBC # BLD AUTO: 4.12 M/UL (ref 4.7–6.1)
SIGNIFICANT IND 70042: NORMAL
SITE SITE: NORMAL
SODIUM SERPL-SCNC: 140 MMOL/L (ref 135–145)
SOURCE SOURCE: NORMAL
WBC # BLD AUTO: 9.6 K/UL (ref 4.8–10.8)

## 2024-10-22 PROCEDURE — 700101 HCHG RX REV CODE 250: Performed by: STUDENT IN AN ORGANIZED HEALTH CARE EDUCATION/TRAINING PROGRAM

## 2024-10-22 PROCEDURE — 93005 ELECTROCARDIOGRAM TRACING: CPT | Performed by: ORTHOPAEDIC SURGERY

## 2024-10-22 PROCEDURE — 87070 CULTURE OTHR SPECIMN AEROBIC: CPT

## 2024-10-22 PROCEDURE — 97602 WOUND(S) CARE NON-SELECTIVE: CPT

## 2024-10-22 PROCEDURE — 13160 SEC CLSR SURG WND/DEHSN XTN: CPT | Mod: ASROC | Performed by: NURSE PRACTITIONER

## 2024-10-22 PROCEDURE — 87205 SMEAR GRAM STAIN: CPT

## 2024-10-22 PROCEDURE — 160038 HCHG SURGERY MINUTES - EA ADDL 1 MIN LEVEL 2: Performed by: ORTHOPAEDIC SURGERY

## 2024-10-22 PROCEDURE — 28122 PARTIAL REMOVAL OF FOOT BONE: CPT | Mod: ASROC,RT | Performed by: NURSE PRACTITIONER

## 2024-10-22 PROCEDURE — 700102 HCHG RX REV CODE 250 W/ 637 OVERRIDE(OP)

## 2024-10-22 PROCEDURE — 770006 HCHG ROOM/CARE - MED/SURG/GYN SEMI*

## 2024-10-22 PROCEDURE — 93010 ELECTROCARDIOGRAM REPORT: CPT | Performed by: INTERNAL MEDICINE

## 2024-10-22 PROCEDURE — 80048 BASIC METABOLIC PNL TOTAL CA: CPT

## 2024-10-22 PROCEDURE — 97605 NEG PRS WND THER DME<=50SQCM: CPT | Mod: ASROC | Performed by: NURSE PRACTITIONER

## 2024-10-22 PROCEDURE — 700102 HCHG RX REV CODE 250 W/ 637 OVERRIDE(OP): Performed by: STUDENT IN AN ORGANIZED HEALTH CARE EDUCATION/TRAINING PROGRAM

## 2024-10-22 PROCEDURE — 28005 TREAT FOOT BONE LESION: CPT | Mod: ASROC,59,RT | Performed by: NURSE PRACTITIONER

## 2024-10-22 PROCEDURE — A9270 NON-COVERED ITEM OR SERVICE: HCPCS | Performed by: ANESTHESIOLOGY

## 2024-10-22 PROCEDURE — 87147 CULTURE TYPE IMMUNOLOGIC: CPT

## 2024-10-22 PROCEDURE — 36415 COLL VENOUS BLD VENIPUNCTURE: CPT

## 2024-10-22 PROCEDURE — 87075 CULTR BACTERIA EXCEPT BLOOD: CPT

## 2024-10-22 PROCEDURE — 0Y6M0Z9 DETACHMENT AT RIGHT FOOT, PARTIAL 1ST RAY, OPEN APPROACH: ICD-10-PCS | Performed by: ORTHOPAEDIC SURGERY

## 2024-10-22 PROCEDURE — A9270 NON-COVERED ITEM OR SERVICE: HCPCS

## 2024-10-22 PROCEDURE — A9270 NON-COVERED ITEM OR SERVICE: HCPCS | Performed by: STUDENT IN AN ORGANIZED HEALTH CARE EDUCATION/TRAINING PROGRAM

## 2024-10-22 PROCEDURE — 160027 HCHG SURGERY MINUTES - 1ST 30 MINS LEVEL 2: Performed by: ORTHOPAEDIC SURGERY

## 2024-10-22 PROCEDURE — 700105 HCHG RX REV CODE 258: Performed by: STUDENT IN AN ORGANIZED HEALTH CARE EDUCATION/TRAINING PROGRAM

## 2024-10-22 PROCEDURE — 99233 SBSQ HOSP IP/OBS HIGH 50: CPT | Performed by: HOSPITALIST

## 2024-10-22 PROCEDURE — 13160 SEC CLSR SURG WND/DEHSN XTN: CPT | Performed by: ORTHOPAEDIC SURGERY

## 2024-10-22 PROCEDURE — 87186 SC STD MICRODIL/AGAR DIL: CPT | Mod: 91

## 2024-10-22 PROCEDURE — 160009 HCHG ANES TIME/MIN: Performed by: ORTHOPAEDIC SURGERY

## 2024-10-22 PROCEDURE — 88304 TISSUE EXAM BY PATHOLOGIST: CPT

## 2024-10-22 PROCEDURE — 85027 COMPLETE CBC AUTOMATED: CPT

## 2024-10-22 PROCEDURE — 82962 GLUCOSE BLOOD TEST: CPT

## 2024-10-22 PROCEDURE — 87076 CULTURE ANAEROBE IDENT EACH: CPT

## 2024-10-22 PROCEDURE — 160035 HCHG PACU - 1ST 60 MINS PHASE I: Performed by: ORTHOPAEDIC SURGERY

## 2024-10-22 PROCEDURE — 700111 HCHG RX REV CODE 636 W/ 250 OVERRIDE (IP): Mod: JZ | Performed by: ANESTHESIOLOGY

## 2024-10-22 PROCEDURE — 28005 TREAT FOOT BONE LESION: CPT | Mod: 59,RT | Performed by: ORTHOPAEDIC SURGERY

## 2024-10-22 PROCEDURE — 160002 HCHG RECOVERY MINUTES (STAT): Performed by: ORTHOPAEDIC SURGERY

## 2024-10-22 PROCEDURE — 700111 HCHG RX REV CODE 636 W/ 250 OVERRIDE (IP): Mod: JZ | Performed by: ORTHOPAEDIC SURGERY

## 2024-10-22 PROCEDURE — 51798 US URINE CAPACITY MEASURE: CPT

## 2024-10-22 PROCEDURE — 700101 HCHG RX REV CODE 250: Performed by: ANESTHESIOLOGY

## 2024-10-22 PROCEDURE — 99222 1ST HOSP IP/OBS MODERATE 55: CPT | Mod: 57 | Performed by: ORTHOPAEDIC SURGERY

## 2024-10-22 PROCEDURE — 97605 NEG PRS WND THER DME<=50SQCM: CPT | Performed by: ORTHOPAEDIC SURGERY

## 2024-10-22 PROCEDURE — 160048 HCHG OR STATISTICAL LEVEL 1-5: Performed by: ORTHOPAEDIC SURGERY

## 2024-10-22 PROCEDURE — 88311 DECALCIFY TISSUE: CPT

## 2024-10-22 PROCEDURE — 28122 PARTIAL REMOVAL OF FOOT BONE: CPT | Mod: RT | Performed by: ORTHOPAEDIC SURGERY

## 2024-10-22 PROCEDURE — 700111 HCHG RX REV CODE 636 W/ 250 OVERRIDE (IP): Mod: JZ | Performed by: STUDENT IN AN ORGANIZED HEALTH CARE EDUCATION/TRAINING PROGRAM

## 2024-10-22 PROCEDURE — 700111 HCHG RX REV CODE 636 W/ 250 OVERRIDE (IP): Performed by: ANESTHESIOLOGY

## 2024-10-22 PROCEDURE — 700111 HCHG RX REV CODE 636 W/ 250 OVERRIDE (IP)

## 2024-10-22 PROCEDURE — 700102 HCHG RX REV CODE 250 W/ 637 OVERRIDE(OP): Performed by: ANESTHESIOLOGY

## 2024-10-22 PROCEDURE — 87077 CULTURE AEROBIC IDENTIFY: CPT

## 2024-10-22 RX ORDER — ROCURONIUM BROMIDE 10 MG/ML
INJECTION, SOLUTION INTRAVENOUS PRN
Status: DISCONTINUED | OUTPATIENT
Start: 2024-10-22 | End: 2024-10-22 | Stop reason: SURG

## 2024-10-22 RX ORDER — VANCOMYCIN HYDROCHLORIDE 500 MG/10ML
INJECTION, POWDER, LYOPHILIZED, FOR SOLUTION INTRAVENOUS
Status: COMPLETED | OUTPATIENT
Start: 2024-10-22 | End: 2024-10-22

## 2024-10-22 RX ORDER — DEXTROSE MONOHYDRATE 25 G/50ML
25 INJECTION, SOLUTION INTRAVENOUS
Status: DISCONTINUED | OUTPATIENT
Start: 2024-10-22 | End: 2024-10-22 | Stop reason: HOSPADM

## 2024-10-22 RX ORDER — HYDROMORPHONE HYDROCHLORIDE 1 MG/ML
0.5 INJECTION, SOLUTION INTRAMUSCULAR; INTRAVENOUS; SUBCUTANEOUS
Status: DISCONTINUED | OUTPATIENT
Start: 2024-10-22 | End: 2024-10-25 | Stop reason: HOSPADM

## 2024-10-22 RX ORDER — DEXAMETHASONE SODIUM PHOSPHATE 4 MG/ML
INJECTION, SOLUTION INTRA-ARTICULAR; INTRALESIONAL; INTRAMUSCULAR; INTRAVENOUS; SOFT TISSUE PRN
Status: DISCONTINUED | OUTPATIENT
Start: 2024-10-22 | End: 2024-10-22 | Stop reason: SURG

## 2024-10-22 RX ORDER — INSULIN LISPRO 100 [IU]/ML
2-9 INJECTION, SOLUTION INTRAVENOUS; SUBCUTANEOUS EVERY 6 HOURS
Status: DISCONTINUED | OUTPATIENT
Start: 2024-10-22 | End: 2024-10-22 | Stop reason: HOSPADM

## 2024-10-22 RX ORDER — LIDOCAINE HYDROCHLORIDE 20 MG/ML
INJECTION, SOLUTION EPIDURAL; INFILTRATION; INTRACAUDAL; PERINEURAL PRN
Status: DISCONTINUED | OUTPATIENT
Start: 2024-10-22 | End: 2024-10-22 | Stop reason: SURG

## 2024-10-22 RX ORDER — HALOPERIDOL 5 MG/ML
5 INJECTION INTRAMUSCULAR EVERY 4 HOURS PRN
Status: DISCONTINUED | OUTPATIENT
Start: 2024-10-22 | End: 2024-10-23

## 2024-10-22 RX ORDER — OXYCODONE HCL 5 MG/5 ML
10 SOLUTION, ORAL ORAL
Status: COMPLETED | OUTPATIENT
Start: 2024-10-22 | End: 2024-10-22

## 2024-10-22 RX ORDER — HYDRALAZINE HYDROCHLORIDE 20 MG/ML
5 INJECTION INTRAMUSCULAR; INTRAVENOUS
Status: DISCONTINUED | OUTPATIENT
Start: 2024-10-22 | End: 2024-10-22 | Stop reason: HOSPADM

## 2024-10-22 RX ORDER — EPHEDRINE SULFATE 50 MG/ML
5 INJECTION, SOLUTION INTRAVENOUS
Status: DISCONTINUED | OUTPATIENT
Start: 2024-10-22 | End: 2024-10-22 | Stop reason: HOSPADM

## 2024-10-22 RX ORDER — HYDROMORPHONE HYDROCHLORIDE 1 MG/ML
0.2 INJECTION, SOLUTION INTRAMUSCULAR; INTRAVENOUS; SUBCUTANEOUS
Status: DISCONTINUED | OUTPATIENT
Start: 2024-10-22 | End: 2024-10-22 | Stop reason: HOSPADM

## 2024-10-22 RX ORDER — ALBUTEROL SULFATE 5 MG/ML
2.5 SOLUTION RESPIRATORY (INHALATION)
Status: DISCONTINUED | OUTPATIENT
Start: 2024-10-22 | End: 2024-10-22 | Stop reason: HOSPADM

## 2024-10-22 RX ORDER — LABETALOL HYDROCHLORIDE 5 MG/ML
5 INJECTION, SOLUTION INTRAVENOUS
Status: DISCONTINUED | OUTPATIENT
Start: 2024-10-22 | End: 2024-10-22 | Stop reason: HOSPADM

## 2024-10-22 RX ORDER — PHENYLEPHRINE HCL IN 0.9% NACL 1 MG/10 ML
SYRINGE (ML) INTRAVENOUS PRN
Status: DISCONTINUED | OUTPATIENT
Start: 2024-10-22 | End: 2024-10-22 | Stop reason: SURG

## 2024-10-22 RX ORDER — EPHEDRINE SULFATE 50 MG/ML
INJECTION, SOLUTION INTRAVENOUS PRN
Status: DISCONTINUED | OUTPATIENT
Start: 2024-10-22 | End: 2024-10-22 | Stop reason: SURG

## 2024-10-22 RX ORDER — OXYCODONE HCL 5 MG/5 ML
5 SOLUTION, ORAL ORAL
Status: COMPLETED | OUTPATIENT
Start: 2024-10-22 | End: 2024-10-22

## 2024-10-22 RX ORDER — OXYCODONE HYDROCHLORIDE 5 MG/1
5 TABLET ORAL
Status: DISCONTINUED | OUTPATIENT
Start: 2024-10-22 | End: 2024-10-25 | Stop reason: HOSPADM

## 2024-10-22 RX ORDER — HYDROMORPHONE HYDROCHLORIDE 1 MG/ML
0.4 INJECTION, SOLUTION INTRAMUSCULAR; INTRAVENOUS; SUBCUTANEOUS
Status: DISCONTINUED | OUTPATIENT
Start: 2024-10-22 | End: 2024-10-22 | Stop reason: HOSPADM

## 2024-10-22 RX ORDER — ONDANSETRON 2 MG/ML
4 INJECTION INTRAMUSCULAR; INTRAVENOUS
Status: DISCONTINUED | OUTPATIENT
Start: 2024-10-22 | End: 2024-10-22 | Stop reason: HOSPADM

## 2024-10-22 RX ORDER — HYDROMORPHONE HYDROCHLORIDE 1 MG/ML
0.1 INJECTION, SOLUTION INTRAMUSCULAR; INTRAVENOUS; SUBCUTANEOUS
Status: DISCONTINUED | OUTPATIENT
Start: 2024-10-22 | End: 2024-10-22 | Stop reason: HOSPADM

## 2024-10-22 RX ORDER — SODIUM CHLORIDE, SODIUM LACTATE, POTASSIUM CHLORIDE, CALCIUM CHLORIDE 600; 310; 30; 20 MG/100ML; MG/100ML; MG/100ML; MG/100ML
INJECTION, SOLUTION INTRAVENOUS CONTINUOUS
Status: DISCONTINUED | OUTPATIENT
Start: 2024-10-22 | End: 2024-10-22 | Stop reason: HOSPADM

## 2024-10-22 RX ORDER — ONDANSETRON 2 MG/ML
INJECTION INTRAMUSCULAR; INTRAVENOUS PRN
Status: DISCONTINUED | OUTPATIENT
Start: 2024-10-22 | End: 2024-10-22 | Stop reason: SURG

## 2024-10-22 RX ORDER — DIPHENHYDRAMINE HYDROCHLORIDE 50 MG/ML
12.5 INJECTION INTRAMUSCULAR; INTRAVENOUS
Status: DISCONTINUED | OUTPATIENT
Start: 2024-10-22 | End: 2024-10-22 | Stop reason: HOSPADM

## 2024-10-22 RX ORDER — OXYCODONE HYDROCHLORIDE 10 MG/1
10 TABLET ORAL
Status: DISCONTINUED | OUTPATIENT
Start: 2024-10-22 | End: 2024-10-25 | Stop reason: HOSPADM

## 2024-10-22 RX ORDER — HALOPERIDOL 5 MG/ML
1 INJECTION INTRAMUSCULAR
Status: DISCONTINUED | OUTPATIENT
Start: 2024-10-22 | End: 2024-10-22 | Stop reason: HOSPADM

## 2024-10-22 RX ADMIN — HYDROMORPHONE HYDROCHLORIDE 0.5 MG: 1 INJECTION, SOLUTION INTRAMUSCULAR; INTRAVENOUS; SUBCUTANEOUS at 23:36

## 2024-10-22 RX ADMIN — SUGAMMADEX 100 MG: 100 INJECTION, SOLUTION INTRAVENOUS at 13:05

## 2024-10-22 RX ADMIN — AMPICILLIN AND SULBACTAM 3 G: 1; 2 INJECTION, POWDER, FOR SOLUTION INTRAMUSCULAR; INTRAVENOUS at 04:46

## 2024-10-22 RX ADMIN — AMPICILLIN AND SULBACTAM 3 G: 1; 2 INJECTION, POWDER, FOR SOLUTION INTRAMUSCULAR; INTRAVENOUS at 12:55

## 2024-10-22 RX ADMIN — ROCURONIUM BROMIDE 15 MG: 50 INJECTION, SOLUTION INTRAVENOUS at 12:54

## 2024-10-22 RX ADMIN — PROPOFOL 80 MG: 10 INJECTION, EMULSION INTRAVENOUS at 12:42

## 2024-10-22 RX ADMIN — FENTANYL CITRATE 50 MCG: 50 INJECTION, SOLUTION INTRAMUSCULAR; INTRAVENOUS at 12:36

## 2024-10-22 RX ADMIN — Medication 100 MCG: at 12:51

## 2024-10-22 RX ADMIN — AMPICILLIN AND SULBACTAM 3 G: 1; 2 INJECTION, POWDER, FOR SOLUTION INTRAMUSCULAR; INTRAVENOUS at 23:43

## 2024-10-22 RX ADMIN — AMPICILLIN AND SULBACTAM 3 G: 1; 2 INJECTION, POWDER, FOR SOLUTION INTRAMUSCULAR; INTRAVENOUS at 17:08

## 2024-10-22 RX ADMIN — DEXAMETHASONE SODIUM PHOSPHATE 2 MG: 4 INJECTION INTRA-ARTICULAR; INTRALESIONAL; INTRAMUSCULAR; INTRAVENOUS; SOFT TISSUE at 12:49

## 2024-10-22 RX ADMIN — OXYCODONE HYDROCHLORIDE 5 MG: 5 SOLUTION ORAL at 13:39

## 2024-10-22 RX ADMIN — AMPICILLIN AND SULBACTAM 3 G: 1; 2 INJECTION, POWDER, FOR SOLUTION INTRAMUSCULAR; INTRAVENOUS at 00:25

## 2024-10-22 RX ADMIN — Medication 100 MCG: at 12:54

## 2024-10-22 RX ADMIN — SODIUM HYPOCHLORITE 0.12 ML: 1.25 SOLUTION TOPICAL at 04:23

## 2024-10-22 RX ADMIN — TAMSULOSIN HYDROCHLORIDE 0.4 MG: 0.4 CAPSULE ORAL at 10:03

## 2024-10-22 RX ADMIN — FENTANYL CITRATE 25 MCG: 50 INJECTION, SOLUTION INTRAMUSCULAR; INTRAVENOUS at 13:42

## 2024-10-22 RX ADMIN — EPHEDRINE SULFATE 10 MG: 50 INJECTION, SOLUTION INTRAVENOUS at 12:48

## 2024-10-22 RX ADMIN — Medication 50 MCG: at 12:49

## 2024-10-22 RX ADMIN — EPHEDRINE SULFATE 10 MG: 50 INJECTION, SOLUTION INTRAVENOUS at 12:53

## 2024-10-22 RX ADMIN — OXYCODONE HYDROCHLORIDE 10 MG: 10 TABLET ORAL at 22:20

## 2024-10-22 RX ADMIN — ONDANSETRON 4 MG: 2 INJECTION INTRAMUSCULAR; INTRAVENOUS at 12:55

## 2024-10-22 RX ADMIN — LIDOCAINE HYDROCHLORIDE 75 MG: 20 INJECTION, SOLUTION EPIDURAL; INFILTRATION; INTRACAUDAL at 12:42

## 2024-10-22 ASSESSMENT — ENCOUNTER SYMPTOMS
MYALGIAS: 0
VOMITING: 0
COUGH: 0
DOUBLE VISION: 0
CLAUDICATION: 0
DEPRESSION: 0
PND: 0
FEVER: 0
WHEEZING: 0
BLURRED VISION: 0
BRUISES/BLEEDS EASILY: 0
HEARTBURN: 0
BACK PAIN: 0
DIZZINESS: 0
NAUSEA: 0
PALPITATIONS: 0
HEADACHES: 0
HEMOPTYSIS: 0
CHILLS: 0

## 2024-10-22 ASSESSMENT — PAIN DESCRIPTION - PAIN TYPE
TYPE: SURGICAL PAIN
TYPE: ACUTE PAIN
TYPE: SURGICAL PAIN

## 2024-10-22 ASSESSMENT — PAIN SCALES - GENERAL: PAIN_LEVEL: 0

## 2024-10-23 LAB
ANION GAP SERPL CALC-SCNC: 17 MMOL/L (ref 7–16)
BUN SERPL-MCNC: 17 MG/DL (ref 8–22)
CALCIUM SERPL-MCNC: 9.1 MG/DL (ref 8.5–10.5)
CHLORIDE SERPL-SCNC: 103 MMOL/L (ref 96–112)
CO2 SERPL-SCNC: 21 MMOL/L (ref 20–33)
CREAT SERPL-MCNC: 1.27 MG/DL (ref 0.5–1.4)
ERYTHROCYTE [DISTWIDTH] IN BLOOD BY AUTOMATED COUNT: 45.3 FL (ref 35.9–50)
GFR SERPLBLD CREATININE-BSD FMLA CKD-EPI: 56 ML/MIN/1.73 M 2
GLUCOSE BLD STRIP.AUTO-MCNC: 132 MG/DL (ref 65–99)
GLUCOSE BLD STRIP.AUTO-MCNC: 173 MG/DL (ref 65–99)
GLUCOSE SERPL-MCNC: 199 MG/DL (ref 65–99)
HCT VFR BLD AUTO: 34.7 % (ref 42–52)
HGB BLD-MCNC: 11.3 G/DL (ref 14–18)
MAGNESIUM SERPL-MCNC: 1.8 MG/DL (ref 1.5–2.5)
MCH RBC QN AUTO: 30 PG (ref 27–33)
MCHC RBC AUTO-ENTMCNC: 32.6 G/DL (ref 32.3–36.5)
MCV RBC AUTO: 92 FL (ref 81.4–97.8)
PLATELET # BLD AUTO: 284 K/UL (ref 164–446)
PMV BLD AUTO: 10.5 FL (ref 9–12.9)
POTASSIUM SERPL-SCNC: 4.6 MMOL/L (ref 3.6–5.5)
RBC # BLD AUTO: 3.77 M/UL (ref 4.7–6.1)
SODIUM SERPL-SCNC: 141 MMOL/L (ref 135–145)
WBC # BLD AUTO: 9.3 K/UL (ref 4.8–10.8)

## 2024-10-23 PROCEDURE — 700105 HCHG RX REV CODE 258: Performed by: HOSPITALIST

## 2024-10-23 PROCEDURE — A9270 NON-COVERED ITEM OR SERVICE: HCPCS

## 2024-10-23 PROCEDURE — 82962 GLUCOSE BLOOD TEST: CPT | Mod: 91

## 2024-10-23 PROCEDURE — A9270 NON-COVERED ITEM OR SERVICE: HCPCS | Performed by: STUDENT IN AN ORGANIZED HEALTH CARE EDUCATION/TRAINING PROGRAM

## 2024-10-23 PROCEDURE — 97166 OT EVAL MOD COMPLEX 45 MIN: CPT

## 2024-10-23 PROCEDURE — 700102 HCHG RX REV CODE 250 W/ 637 OVERRIDE(OP): Performed by: STUDENT IN AN ORGANIZED HEALTH CARE EDUCATION/TRAINING PROGRAM

## 2024-10-23 PROCEDURE — 97167 OT EVAL HIGH COMPLEX 60 MIN: CPT

## 2024-10-23 PROCEDURE — 83735 ASSAY OF MAGNESIUM: CPT

## 2024-10-23 PROCEDURE — 36415 COLL VENOUS BLD VENIPUNCTURE: CPT

## 2024-10-23 PROCEDURE — 80048 BASIC METABOLIC PNL TOTAL CA: CPT

## 2024-10-23 PROCEDURE — 97535 SELF CARE MNGMENT TRAINING: CPT

## 2024-10-23 PROCEDURE — 85027 COMPLETE CBC AUTOMATED: CPT

## 2024-10-23 PROCEDURE — 700111 HCHG RX REV CODE 636 W/ 250 OVERRIDE (IP): Mod: JZ | Performed by: STUDENT IN AN ORGANIZED HEALTH CARE EDUCATION/TRAINING PROGRAM

## 2024-10-23 PROCEDURE — 700105 HCHG RX REV CODE 258: Performed by: STUDENT IN AN ORGANIZED HEALTH CARE EDUCATION/TRAINING PROGRAM

## 2024-10-23 PROCEDURE — 770006 HCHG ROOM/CARE - MED/SURG/GYN SEMI*

## 2024-10-23 PROCEDURE — 700102 HCHG RX REV CODE 250 W/ 637 OVERRIDE(OP)

## 2024-10-23 PROCEDURE — 700111 HCHG RX REV CODE 636 W/ 250 OVERRIDE (IP): Mod: JZ

## 2024-10-23 PROCEDURE — 700111 HCHG RX REV CODE 636 W/ 250 OVERRIDE (IP): Mod: JZ | Performed by: HOSPITALIST

## 2024-10-23 PROCEDURE — 700102 HCHG RX REV CODE 250 W/ 637 OVERRIDE(OP): Performed by: HOSPITALIST

## 2024-10-23 PROCEDURE — 99233 SBSQ HOSP IP/OBS HIGH 50: CPT | Performed by: HOSPITALIST

## 2024-10-23 PROCEDURE — 700111 HCHG RX REV CODE 636 W/ 250 OVERRIDE (IP): Performed by: HOSPITALIST

## 2024-10-23 RX ORDER — INSULIN LISPRO 100 [IU]/ML
1-6 INJECTION, SOLUTION INTRAVENOUS; SUBCUTANEOUS
Status: DISCONTINUED | OUTPATIENT
Start: 2024-10-23 | End: 2024-10-25 | Stop reason: HOSPADM

## 2024-10-23 RX ORDER — DEXTROSE MONOHYDRATE 25 G/50ML
25 INJECTION, SOLUTION INTRAVENOUS
Status: DISCONTINUED | OUTPATIENT
Start: 2024-10-23 | End: 2024-10-25 | Stop reason: HOSPADM

## 2024-10-23 RX ORDER — LORAZEPAM 2 MG/ML
0.5 INJECTION INTRAMUSCULAR EVERY 4 HOURS PRN
Status: DISCONTINUED | OUTPATIENT
Start: 2024-10-23 | End: 2024-10-25 | Stop reason: HOSPADM

## 2024-10-23 RX ORDER — PROPRANOLOL HYDROCHLORIDE 10 MG/1
10 TABLET ORAL ONCE
Status: ACTIVE | OUTPATIENT
Start: 2024-10-23 | End: 2024-10-24

## 2024-10-23 RX ORDER — MAGNESIUM SULFATE HEPTAHYDRATE 40 MG/ML
2 INJECTION, SOLUTION INTRAVENOUS ONCE
Status: COMPLETED | OUTPATIENT
Start: 2024-10-23 | End: 2024-10-23

## 2024-10-23 RX ADMIN — AMPICILLIN AND SULBACTAM 3 G: 1; 2 INJECTION, POWDER, FOR SOLUTION INTRAMUSCULAR; INTRAVENOUS at 23:43

## 2024-10-23 RX ADMIN — HYDROXYZINE HYDROCHLORIDE 25 MG: 50 TABLET, FILM COATED ORAL at 01:06

## 2024-10-23 RX ADMIN — OXYCODONE 5 MG: 5 TABLET ORAL at 23:34

## 2024-10-23 RX ADMIN — TAMSULOSIN HYDROCHLORIDE 0.4 MG: 0.4 CAPSULE ORAL at 10:08

## 2024-10-23 RX ADMIN — LORAZEPAM 0.5 MG: 2 INJECTION INTRAMUSCULAR; INTRAVENOUS at 02:25

## 2024-10-23 RX ADMIN — HALOPERIDOL LACTATE 5 MG: 5 INJECTION, SOLUTION INTRAMUSCULAR at 00:01

## 2024-10-23 RX ADMIN — OXYCODONE HYDROCHLORIDE 10 MG: 10 TABLET ORAL at 12:16

## 2024-10-23 RX ADMIN — AMPICILLIN AND SULBACTAM 3 G: 1; 2 INJECTION, POWDER, FOR SOLUTION INTRAMUSCULAR; INTRAVENOUS at 12:19

## 2024-10-23 RX ADMIN — AMPICILLIN AND SULBACTAM 3 G: 1; 2 INJECTION, POWDER, FOR SOLUTION INTRAMUSCULAR; INTRAVENOUS at 04:43

## 2024-10-23 RX ADMIN — MAGNESIUM SULFATE HEPTAHYDRATE 2 G: 2 INJECTION, SOLUTION INTRAVENOUS at 10:15

## 2024-10-23 RX ADMIN — AMPICILLIN AND SULBACTAM 3 G: 1; 2 INJECTION, POWDER, FOR SOLUTION INTRAMUSCULAR; INTRAVENOUS at 17:39

## 2024-10-23 RX ADMIN — INSULIN LISPRO 1 UNITS: 100 INJECTION, SOLUTION INTRAVENOUS; SUBCUTANEOUS at 17:32

## 2024-10-23 ASSESSMENT — ENCOUNTER SYMPTOMS
PND: 0
PALPITATIONS: 0
BRUISES/BLEEDS EASILY: 0
DOUBLE VISION: 0
DEPRESSION: 0
MYALGIAS: 0
FEVER: 0
COUGH: 0
BACK PAIN: 0
BLURRED VISION: 0
HEARTBURN: 0
NAUSEA: 0
DIZZINESS: 0
VOMITING: 0
HEADACHES: 0
WHEEZING: 0
HEMOPTYSIS: 0
CLAUDICATION: 0
CHILLS: 0

## 2024-10-23 ASSESSMENT — PAIN DESCRIPTION - PAIN TYPE
TYPE: SURGICAL PAIN
TYPE: ACUTE PAIN
TYPE: SURGICAL PAIN

## 2024-10-23 ASSESSMENT — COGNITIVE AND FUNCTIONAL STATUS - GENERAL
PERSONAL GROOMING: A LITTLE
TOILETING: A LOT
DRESSING REGULAR LOWER BODY CLOTHING: A LOT
SUGGESTED CMS G CODE MODIFIER DAILY ACTIVITY: CK
DRESSING REGULAR UPPER BODY CLOTHING: A LITTLE
DAILY ACTIVITIY SCORE: 16
HELP NEEDED FOR BATHING: A LOT

## 2024-10-23 ASSESSMENT — ACTIVITIES OF DAILY LIVING (ADL): TOILETING: REQUIRES ASSIST

## 2024-10-24 LAB
ANION GAP SERPL CALC-SCNC: 12 MMOL/L (ref 7–16)
BUN SERPL-MCNC: 19 MG/DL (ref 8–22)
CALCIUM SERPL-MCNC: 9 MG/DL (ref 8.5–10.5)
CHLORIDE SERPL-SCNC: 104 MMOL/L (ref 96–112)
CO2 SERPL-SCNC: 22 MMOL/L (ref 20–33)
CREAT SERPL-MCNC: 1.28 MG/DL (ref 0.5–1.4)
ERYTHROCYTE [DISTWIDTH] IN BLOOD BY AUTOMATED COUNT: 45.3 FL (ref 35.9–50)
GFR SERPLBLD CREATININE-BSD FMLA CKD-EPI: 56 ML/MIN/1.73 M 2
GLUCOSE BLD STRIP.AUTO-MCNC: 138 MG/DL (ref 65–99)
GLUCOSE BLD STRIP.AUTO-MCNC: 144 MG/DL (ref 65–99)
GLUCOSE BLD STRIP.AUTO-MCNC: 167 MG/DL (ref 65–99)
GLUCOSE SERPL-MCNC: 154 MG/DL (ref 65–99)
HCT VFR BLD AUTO: 32.8 % (ref 42–52)
HGB BLD-MCNC: 10.1 G/DL (ref 14–18)
MAGNESIUM SERPL-MCNC: 2.2 MG/DL (ref 1.5–2.5)
MCH RBC QN AUTO: 28.8 PG (ref 27–33)
MCHC RBC AUTO-ENTMCNC: 30.8 G/DL (ref 32.3–36.5)
MCV RBC AUTO: 93.4 FL (ref 81.4–97.8)
PLATELET # BLD AUTO: 257 K/UL (ref 164–446)
PMV BLD AUTO: 10.3 FL (ref 9–12.9)
POTASSIUM SERPL-SCNC: 4.3 MMOL/L (ref 3.6–5.5)
RBC # BLD AUTO: 3.51 M/UL (ref 4.7–6.1)
SODIUM SERPL-SCNC: 138 MMOL/L (ref 135–145)
WBC # BLD AUTO: 6.4 K/UL (ref 4.8–10.8)

## 2024-10-24 PROCEDURE — 97162 PT EVAL MOD COMPLEX 30 MIN: CPT

## 2024-10-24 PROCEDURE — 97607 NEG PRS WND THR NDME<=50SQCM: CPT

## 2024-10-24 PROCEDURE — 82962 GLUCOSE BLOOD TEST: CPT

## 2024-10-24 PROCEDURE — 700111 HCHG RX REV CODE 636 W/ 250 OVERRIDE (IP)

## 2024-10-24 PROCEDURE — 700102 HCHG RX REV CODE 250 W/ 637 OVERRIDE(OP)

## 2024-10-24 PROCEDURE — 700111 HCHG RX REV CODE 636 W/ 250 OVERRIDE (IP): Performed by: HOSPITALIST

## 2024-10-24 PROCEDURE — 700105 HCHG RX REV CODE 258: Performed by: HOSPITALIST

## 2024-10-24 PROCEDURE — 700105 HCHG RX REV CODE 258: Performed by: INTERNAL MEDICINE

## 2024-10-24 PROCEDURE — 99233 SBSQ HOSP IP/OBS HIGH 50: CPT | Performed by: HOSPITALIST

## 2024-10-24 PROCEDURE — 85027 COMPLETE CBC AUTOMATED: CPT

## 2024-10-24 PROCEDURE — 80048 BASIC METABOLIC PNL TOTAL CA: CPT

## 2024-10-24 PROCEDURE — A9270 NON-COVERED ITEM OR SERVICE: HCPCS

## 2024-10-24 PROCEDURE — 700111 HCHG RX REV CODE 636 W/ 250 OVERRIDE (IP): Mod: JZ

## 2024-10-24 PROCEDURE — 99223 1ST HOSP IP/OBS HIGH 75: CPT | Performed by: INTERNAL MEDICINE

## 2024-10-24 PROCEDURE — 97535 SELF CARE MNGMENT TRAINING: CPT

## 2024-10-24 PROCEDURE — 83735 ASSAY OF MAGNESIUM: CPT

## 2024-10-24 PROCEDURE — 700102 HCHG RX REV CODE 250 W/ 637 OVERRIDE(OP): Performed by: STUDENT IN AN ORGANIZED HEALTH CARE EDUCATION/TRAINING PROGRAM

## 2024-10-24 PROCEDURE — 700111 HCHG RX REV CODE 636 W/ 250 OVERRIDE (IP): Mod: JZ | Performed by: HOSPITALIST

## 2024-10-24 PROCEDURE — 770001 HCHG ROOM/CARE - MED/SURG/GYN PRIV*

## 2024-10-24 PROCEDURE — 306565 RIGID MIT RESTRAINT(PAIR): Performed by: HOSPITALIST

## 2024-10-24 PROCEDURE — 700111 HCHG RX REV CODE 636 W/ 250 OVERRIDE (IP): Mod: JZ | Performed by: INTERNAL MEDICINE

## 2024-10-24 PROCEDURE — A9270 NON-COVERED ITEM OR SERVICE: HCPCS | Performed by: STUDENT IN AN ORGANIZED HEALTH CARE EDUCATION/TRAINING PROGRAM

## 2024-10-24 PROCEDURE — A9270 NON-COVERED ITEM OR SERVICE: HCPCS | Performed by: INTERNAL MEDICINE

## 2024-10-24 PROCEDURE — 700102 HCHG RX REV CODE 250 W/ 637 OVERRIDE(OP): Performed by: INTERNAL MEDICINE

## 2024-10-24 RX ORDER — LINEZOLID 600 MG/1
600 TABLET, FILM COATED ORAL EVERY 12 HOURS
Status: DISCONTINUED | OUTPATIENT
Start: 2024-10-24 | End: 2024-10-25

## 2024-10-24 RX ORDER — HALOPERIDOL 5 MG/ML
2 INJECTION INTRAMUSCULAR ONCE
Status: COMPLETED | OUTPATIENT
Start: 2024-10-24 | End: 2024-10-24

## 2024-10-24 RX ADMIN — HALOPERIDOL LACTATE 2 MG: 5 INJECTION, SOLUTION INTRAMUSCULAR at 23:29

## 2024-10-24 RX ADMIN — LORAZEPAM 0.5 MG: 2 INJECTION INTRAMUSCULAR; INTRAVENOUS at 22:38

## 2024-10-24 RX ADMIN — LORAZEPAM 0.5 MG: 2 INJECTION INTRAMUSCULAR; INTRAVENOUS at 01:47

## 2024-10-24 RX ADMIN — LINEZOLID 600 MG: 600 TABLET, FILM COATED ORAL at 17:37

## 2024-10-24 RX ADMIN — LORAZEPAM 0.5 MG: 2 INJECTION INTRAMUSCULAR; INTRAVENOUS at 10:41

## 2024-10-24 RX ADMIN — OXYCODONE 5 MG: 5 TABLET ORAL at 16:09

## 2024-10-24 RX ADMIN — HEPARIN SODIUM 5000 UNITS: 5000 INJECTION, SOLUTION INTRAVENOUS; SUBCUTANEOUS at 21:58

## 2024-10-24 RX ADMIN — AMPICILLIN AND SULBACTAM 3 G: 1; 2 INJECTION, POWDER, FOR SOLUTION INTRAMUSCULAR; INTRAVENOUS at 05:11

## 2024-10-24 RX ADMIN — INSULIN LISPRO 1 UNITS: 100 INJECTION, SOLUTION INTRAVENOUS; SUBCUTANEOUS at 17:33

## 2024-10-24 RX ADMIN — HYDROXYZINE HYDROCHLORIDE 25 MG: 50 TABLET, FILM COATED ORAL at 05:06

## 2024-10-24 RX ADMIN — CEFEPIME 2 G: 2 INJECTION, POWDER, FOR SOLUTION INTRAVENOUS at 18:38

## 2024-10-24 RX ADMIN — LORAZEPAM 0.5 MG: 2 INJECTION INTRAMUSCULAR; INTRAVENOUS at 10:40

## 2024-10-24 RX ADMIN — HEPARIN SODIUM 5000 UNITS: 5000 INJECTION, SOLUTION INTRAVENOUS; SUBCUTANEOUS at 13:47

## 2024-10-24 RX ADMIN — OXYCODONE HYDROCHLORIDE 10 MG: 10 TABLET ORAL at 05:06

## 2024-10-24 ASSESSMENT — ENCOUNTER SYMPTOMS
HEADACHES: 0
HEMOPTYSIS: 0
BLURRED VISION: 0
HEARTBURN: 0
PND: 0
MYALGIAS: 0
DIZZINESS: 0
COUGH: 0
PALPITATIONS: 0
NAUSEA: 0
VOMITING: 0
DOUBLE VISION: 0
BRUISES/BLEEDS EASILY: 0
FEVER: 0
CLAUDICATION: 0
CHILLS: 0
BACK PAIN: 0
DEPRESSION: 0
WHEEZING: 0

## 2024-10-24 ASSESSMENT — COGNITIVE AND FUNCTIONAL STATUS - GENERAL
PERSONAL GROOMING: A LITTLE
WALKING IN HOSPITAL ROOM: A LITTLE
MOVING TO AND FROM BED TO CHAIR: A LITTLE
HELP NEEDED FOR BATHING: A LOT
MOBILITY SCORE: 21
DRESSING REGULAR LOWER BODY CLOTHING: A LOT
SUGGESTED CMS G CODE MODIFIER DAILY ACTIVITY: CK
SUGGESTED CMS G CODE MODIFIER MOBILITY: CJ
DRESSING REGULAR UPPER BODY CLOTHING: A LITTLE
CLIMB 3 TO 5 STEPS WITH RAILING: A LITTLE
TOILETING: A LOT
DAILY ACTIVITIY SCORE: 16

## 2024-10-24 ASSESSMENT — GAIT ASSESSMENTS
DISTANCE (FEET): 100
ASSISTIVE DEVICE: FRONT WHEEL WALKER
GAIT LEVEL OF ASSIST: CONTACT GUARD ASSIST

## 2024-10-24 ASSESSMENT — PAIN DESCRIPTION - PAIN TYPE
TYPE: ACUTE PAIN
TYPE: SURGICAL PAIN
TYPE: ACUTE PAIN
TYPE: ACUTE PAIN

## 2024-10-25 ENCOUNTER — PHARMACY VISIT (OUTPATIENT)
Dept: PHARMACY | Facility: MEDICAL CENTER | Age: 84
End: 2024-10-25
Payer: COMMERCIAL

## 2024-10-25 VITALS
TEMPERATURE: 98.8 F | RESPIRATION RATE: 19 BRPM | SYSTOLIC BLOOD PRESSURE: 162 MMHG | HEART RATE: 119 BPM | WEIGHT: 140 LBS | BODY MASS INDEX: 19.53 KG/M2 | DIASTOLIC BLOOD PRESSURE: 82 MMHG | OXYGEN SATURATION: 95 %

## 2024-10-25 LAB
ANION GAP SERPL CALC-SCNC: 16 MMOL/L (ref 7–16)
BACTERIA TISS AEROBE CULT: ABNORMAL
BUN SERPL-MCNC: 21 MG/DL (ref 8–22)
CALCIUM SERPL-MCNC: 9.2 MG/DL (ref 8.5–10.5)
CHLORIDE SERPL-SCNC: 103 MMOL/L (ref 96–112)
CO2 SERPL-SCNC: 18 MMOL/L (ref 20–33)
CREAT SERPL-MCNC: 1.28 MG/DL (ref 0.5–1.4)
ERYTHROCYTE [DISTWIDTH] IN BLOOD BY AUTOMATED COUNT: 44.3 FL (ref 35.9–50)
GFR SERPLBLD CREATININE-BSD FMLA CKD-EPI: 56 ML/MIN/1.73 M 2
GLUCOSE BLD STRIP.AUTO-MCNC: 145 MG/DL (ref 65–99)
GLUCOSE BLD STRIP.AUTO-MCNC: 155 MG/DL (ref 65–99)
GLUCOSE SERPL-MCNC: 165 MG/DL (ref 65–99)
GRAM STN SPEC: ABNORMAL
HCT VFR BLD AUTO: 34.4 % (ref 42–52)
HGB BLD-MCNC: 11 G/DL (ref 14–18)
MAGNESIUM SERPL-MCNC: 2.1 MG/DL (ref 1.5–2.5)
MCH RBC QN AUTO: 29.3 PG (ref 27–33)
MCHC RBC AUTO-ENTMCNC: 32 G/DL (ref 32.3–36.5)
MCV RBC AUTO: 91.7 FL (ref 81.4–97.8)
PLATELET # BLD AUTO: 262 K/UL (ref 164–446)
PMV BLD AUTO: 10.6 FL (ref 9–12.9)
POTASSIUM SERPL-SCNC: 4.3 MMOL/L (ref 3.6–5.5)
RBC # BLD AUTO: 3.75 M/UL (ref 4.7–6.1)
SIGNIFICANT IND 70042: ABNORMAL
SITE SITE: ABNORMAL
SODIUM SERPL-SCNC: 137 MMOL/L (ref 135–145)
SOURCE SOURCE: ABNORMAL
WBC # BLD AUTO: 12.7 K/UL (ref 4.8–10.8)

## 2024-10-25 PROCEDURE — 83735 ASSAY OF MAGNESIUM: CPT

## 2024-10-25 PROCEDURE — 700111 HCHG RX REV CODE 636 W/ 250 OVERRIDE (IP): Mod: JZ | Performed by: INTERNAL MEDICINE

## 2024-10-25 PROCEDURE — 99239 HOSP IP/OBS DSCHRG MGMT >30: CPT | Performed by: INTERNAL MEDICINE

## 2024-10-25 PROCEDURE — 85027 COMPLETE CBC AUTOMATED: CPT

## 2024-10-25 PROCEDURE — 80048 BASIC METABOLIC PNL TOTAL CA: CPT

## 2024-10-25 PROCEDURE — 700105 HCHG RX REV CODE 258: Performed by: INTERNAL MEDICINE

## 2024-10-25 PROCEDURE — A9270 NON-COVERED ITEM OR SERVICE: HCPCS | Performed by: INTERNAL MEDICINE

## 2024-10-25 PROCEDURE — 700102 HCHG RX REV CODE 250 W/ 637 OVERRIDE(OP): Performed by: INTERNAL MEDICINE

## 2024-10-25 PROCEDURE — 700111 HCHG RX REV CODE 636 W/ 250 OVERRIDE (IP): Performed by: HOSPITALIST

## 2024-10-25 PROCEDURE — RXMED WILLOW AMBULATORY MEDICATION CHARGE: Performed by: INTERNAL MEDICINE

## 2024-10-25 PROCEDURE — 99233 SBSQ HOSP IP/OBS HIGH 50: CPT | Performed by: INTERNAL MEDICINE

## 2024-10-25 PROCEDURE — 700102 HCHG RX REV CODE 250 W/ 637 OVERRIDE(OP): Performed by: STUDENT IN AN ORGANIZED HEALTH CARE EDUCATION/TRAINING PROGRAM

## 2024-10-25 PROCEDURE — 82962 GLUCOSE BLOOD TEST: CPT

## 2024-10-25 PROCEDURE — A9270 NON-COVERED ITEM OR SERVICE: HCPCS | Performed by: STUDENT IN AN ORGANIZED HEALTH CARE EDUCATION/TRAINING PROGRAM

## 2024-10-25 RX ORDER — SODIUM BICARBONATE 650 MG/1
650 TABLET ORAL 3 TIMES DAILY
Status: DISCONTINUED | OUTPATIENT
Start: 2024-10-25 | End: 2024-10-25 | Stop reason: HOSPADM

## 2024-10-25 RX ORDER — GLIPIZIDE 5 MG/1
5 TABLET ORAL 2 TIMES DAILY
Qty: 60 TABLET | Refills: 0 | Status: SHIPPED | OUTPATIENT
Start: 2024-10-25

## 2024-10-25 RX ORDER — SULFAMETHOXAZOLE AND TRIMETHOPRIM 800; 160 MG/1; MG/1
1 TABLET ORAL EVERY 12 HOURS
Status: DISCONTINUED | OUTPATIENT
Start: 2024-10-25 | End: 2024-10-25 | Stop reason: HOSPADM

## 2024-10-25 RX ORDER — LOSARTAN POTASSIUM 25 MG/1
25 TABLET ORAL
Status: DISCONTINUED | OUTPATIENT
Start: 2024-10-25 | End: 2024-10-25 | Stop reason: HOSPADM

## 2024-10-25 RX ORDER — LOSARTAN POTASSIUM 25 MG/1
25 TABLET ORAL DAILY
Qty: 30 TABLET | Refills: 0 | Status: SHIPPED | OUTPATIENT
Start: 2024-10-26

## 2024-10-25 RX ORDER — SULFAMETHOXAZOLE AND TRIMETHOPRIM 800; 160 MG/1; MG/1
1 TABLET ORAL EVERY 12 HOURS
Qty: 28 TABLET | Refills: 0 | Status: ACTIVE | OUTPATIENT
Start: 2024-10-25 | End: 2024-11-08

## 2024-10-25 RX ORDER — TAMSULOSIN HYDROCHLORIDE 0.4 MG/1
0.4 CAPSULE ORAL
Qty: 30 CAPSULE | Refills: 0 | Status: SHIPPED | OUTPATIENT
Start: 2024-10-26

## 2024-10-25 RX ORDER — OXYCODONE HYDROCHLORIDE 5 MG/1
5 TABLET ORAL EVERY 6 HOURS PRN
Qty: 15 TABLET | Refills: 0 | Status: SHIPPED | OUTPATIENT
Start: 2024-10-25 | End: 2024-10-30

## 2024-10-25 RX ADMIN — INSULIN LISPRO 1 UNITS: 100 INJECTION, SOLUTION INTRAVENOUS; SUBCUTANEOUS at 08:53

## 2024-10-25 RX ADMIN — CEFEPIME 2 G: 2 INJECTION, POWDER, FOR SOLUTION INTRAVENOUS at 05:54

## 2024-10-25 RX ADMIN — SODIUM BICARBONATE 650 MG: 650 TABLET ORAL at 12:44

## 2024-10-25 RX ADMIN — HEPARIN SODIUM 5000 UNITS: 5000 INJECTION, SOLUTION INTRAVENOUS; SUBCUTANEOUS at 12:44

## 2024-10-25 RX ADMIN — LOSARTAN POTASSIUM 25 MG: 25 TABLET, FILM COATED ORAL at 12:44

## 2024-10-25 RX ADMIN — HEPARIN SODIUM 5000 UNITS: 5000 INJECTION, SOLUTION INTRAVENOUS; SUBCUTANEOUS at 05:51

## 2024-10-25 RX ADMIN — TAMSULOSIN HYDROCHLORIDE 0.4 MG: 0.4 CAPSULE ORAL at 09:27

## 2024-10-25 ASSESSMENT — PAIN DESCRIPTION - PAIN TYPE: TYPE: ACUTE PAIN

## 2024-10-27 LAB
BACTERIA SPEC ANAEROBE CULT: ABNORMAL
BACTERIA SPEC ANAEROBE CULT: ABNORMAL
SIGNIFICANT IND 70042: ABNORMAL
SITE SITE: ABNORMAL
SOURCE SOURCE: ABNORMAL

## 2024-10-31 ENCOUNTER — TELEPHONE (OUTPATIENT)
Dept: INFECTIOUS DISEASES | Facility: MEDICAL CENTER | Age: 84
End: 2024-10-31
Payer: MEDICARE

## 2024-12-19 ENCOUNTER — TELEPHONE (OUTPATIENT)
Dept: INFECTIOUS DISEASES | Facility: MEDICAL CENTER | Age: 84
End: 2024-12-19
Payer: MEDICARE

## 2024-12-19 NOTE — TELEPHONE ENCOUNTER
Received call from [employer] LEVI with update on referral sent  for patient. [facility] ID has schedule patient 3 different times and patient has not shown up to any appts

## (undated) DEVICE — LACTATED RINGERS INJ 1000 ML - (14EA/CA 60CA/PF)

## (undated) DEVICE — TOOL MR8 9CM MATCH HEAD 3MM DIAMETER (1/EA)

## (undated) DEVICE — TUBING CLEARLINK DUO-VENT - C-FLO (48EA/CA)

## (undated) DEVICE — GLOVE BIOGEL PI INDICATOR SZ 6.5 SURGICAL PF LF - (50/BX 4BX/CA)

## (undated) DEVICE — HEMOSTAT SURG ABSORBABLE - 4 X 8 IN SURGICEL (24EA/CA)

## (undated) DEVICE — PACK CYSTOSCOPY - (14/CA)

## (undated) DEVICE — JELLY, KY 2 0Z STERILE

## (undated) DEVICE — GLOVE BIOGEL SZ 8 SURGICAL PF LTX - (50PR/BX 4BX/CA)

## (undated) DEVICE — GLOVE BIOGEL SZ 7.5 SURGICAL PF LTX - (50PR/BX 4BX/CA)

## (undated) DEVICE — ELECTRODE BIPOLAR REGULAR CUTTING LOOP URO 24/26 FR (6EA/PK)

## (undated) DEVICE — TUBE CONNECT SUCTION CLEAR 120 X 1/4" (50EA/CA)"

## (undated) DEVICE — CANISTER SUCTION RIGID RED 1500CC (40EA/CA)

## (undated) DEVICE — PACK SINGLE BASIN - (6/CA)

## (undated) DEVICE — GLOVE BIOGEL PI INDICATOR SZ 8.0 SURGICAL PF LF -(50/BX 4BX/CA)

## (undated) DEVICE — SODIUM CHL. IRRIGATION 0.9% 3000ML (4EA/CA 65CA/PF)

## (undated) DEVICE — COVER LIGHT HANDLE ALC PLUS DISP (18EA/BX)

## (undated) DEVICE — GLOVE BIOGEL PI INDICATOR SZ 7.5 SURGICAL PF LF -(50/BX 4BX/CA)

## (undated) DEVICE — DRESSING KIT V.A.C. SENSA T.R.A.C. SMALL (5EA/CA)

## (undated) DEVICE — WRAP CO-FLEX 4IN X 5YD STERIL - SELF-ADHERENT (18/CA)

## (undated) DEVICE — GLOVE SZ 6.5 BIOGEL PI MICRO - PF LF (50PR/BX)

## (undated) DEVICE — ELECTRODE DUAL RETURN W/ CORD - (50/PK)

## (undated) DEVICE — DRESSING POST OP BORDER 4 X 10 (5EA/BX)

## (undated) DEVICE — PATTIES SURG X-RAYCOTTONOID - 1/2 X 3 IN (200/CA)

## (undated) DEVICE — BLADE SURGICAL #15 - (50/BX 3BX/CA)

## (undated) DEVICE — GLOVE BIOGEL PI ORTHO SZ 7.5 PF LF (40PR/BX)

## (undated) DEVICE — KIT  I.V. START (100EA/CA)

## (undated) DEVICE — VAC CANISTER W/GEL 500ML - FITS NEW MACHINES (10EA/CA)

## (undated) DEVICE — BONE WAX (12PK/BX)

## (undated) DEVICE — GLOVE SZ 7.5 BIOGEL PI MICRO - PF LF (50PR/BX)

## (undated) DEVICE — SET LEADWIRE 5 LEAD BEDSIDE DISPOSABLE ECG (1SET OF 5/EA)

## (undated) DEVICE — SYRINGE 10 ML CONTROL LL (25EA/BX 4BX/CA)

## (undated) DEVICE — COVER MAYO STAND X-LG - (22EA/CA)

## (undated) DEVICE — DRAPE STRLE REG TOWEL 18X24 - (10/BX 4BX/CA)"

## (undated) DEVICE — STAY ELASTIC BLUNT RETRACTOR 12MM (8EA/PK)

## (undated) DEVICE — CANISTER SUCTION 3000ML MECHANICAL FILTER AUTO SHUTOFF MEDI-VAC NONSTERILE LF DISP (40EA/CA)

## (undated) DEVICE — KIT ANESTHESIA W/CIRCUIT & 3/LT BAG W/FILTER (20EA/CA)

## (undated) DEVICE — SUCTION INSTRUMENT YANKAUER BULBOUS TIP W/O VENT (50EA/CA)

## (undated) DEVICE — DRAPE IOBAN II INCISE 23X17 - (10EA/BX 4BX/CA)

## (undated) DEVICE — GLOVE BIOGEL PI INDICATOR SZ 9.0 SURGICAL PF LF -(50PR/BX 4BX/CA)

## (undated) DEVICE — HEAD HOLDER JUNIOR/ADULT

## (undated) DEVICE — DETERGENT RENUZYME PLUS 10 OZ PACKET (50/BX)

## (undated) DEVICE — SYRINGE DISP. 12 CC LL - (100/BX)

## (undated) DEVICE — TRAY SRGPRP PVP IOD WT PRP - (20/CA)

## (undated) DEVICE — SUTURE 2-0 ETHILON FS - (36/BX) 18 INCH

## (undated) DEVICE — DRESSING XEROFORM 1X8 - (50/BX 4BX/CA)

## (undated) DEVICE — KIT ROOM DECONTAMINATION

## (undated) DEVICE — GLOVESZ 8.5 BIOGEL PI MICRO - PF LF (50PR/BX)

## (undated) DEVICE — TUBE CONNECTING SUCTION - CLEAR PLASTIC STERILE 72 IN (50EA/CA)

## (undated) DEVICE — SUTURE 3-0 VICRYL PLUS SH - 8X 18 INCH (12/BX)

## (undated) DEVICE — CHLORAPREP 26 ML APPLICATOR - ORANGE TINT(25/CA)

## (undated) DEVICE — PERFORATER DISP TIP DGR-0

## (undated) DEVICE — KIT EVACUATER 3 SPRING PVC LF 1/8 DRAIN SIZE (10EA/CA)"

## (undated) DEVICE — CATHETER URETHRAL FOLEY SILICONE 22 FR 30 ML 3-WAY

## (undated) DEVICE — SUTURE 3-0 ETHILON PS-1 (36PK/BX)

## (undated) DEVICE — BANDAGE STERILE 2 IN X 75 IN (12EA/BX 8BX/CA)

## (undated) DEVICE — GOWN WARMING STANDARD FLEX - (30/CA)

## (undated) DEVICE — NEPTUNE 4 PORT MANIFOLD - (20/PK)

## (undated) DEVICE — TOOL MR8 2.3CM F2/7CM TAPER (1/EA)

## (undated) DEVICE — EVACUATOR BLADDER ELLIK - (10/BX)

## (undated) DEVICE — GOWN SURGICAL X-LARGE ULTRA - FILM-REINFORCED (20/CA)

## (undated) DEVICE — SYRINGE TOOMEY (50EA/CA)

## (undated) DEVICE — MASK AIRWAY FLEXIBLE SINGLE-USE SIZE 5 ADULTS (10EA/BX)

## (undated) DEVICE — CORETEMP DRAPE FORM-FITTED EASY DROPANDGO DRAPE FOR USE ON THE CORETEMP FLUID MANAGEMENT 56IN X 56IN

## (undated) DEVICE — MASK OXYGEN VNYL ADLT MED CONC WITH 7 FOOT TUBING - (50EA/CA)

## (undated) DEVICE — SUTURE 2-0 VICRYL PLUS CT-1 - 8 X 18 INCH(12/BX)

## (undated) DEVICE — PROTECTOR ULNA NERVE - (36PR/CA)

## (undated) DEVICE — SURGIFOAM (SIZE 100) - (6EA/CA)

## (undated) DEVICE — ELECTRODE 850 FOAM ADHESIVE - HYDROGEL RADIOTRNSPRNT (50/PK)

## (undated) DEVICE — SPONGE GAUZESTER 4 X 4 4PLY - (128PK/CA)

## (undated) DEVICE — FORCEPS IRRIGATING 9 X 1.5MM (5EA/BX)

## (undated) DEVICE — GLOVE BIOGEL INDICATOR SZ 8.5 SURGICAL PF LTX - (50/BX 4BX/CA)

## (undated) DEVICE — FORCEPS IRRIGATING 8 X 1.5MM (5EA/BX)

## (undated) DEVICE — BLADE SURGICAL #10 - (50/BX)

## (undated) DEVICE — BAG URODRAIN WITH TUBING - (20/CA)

## (undated) DEVICE — SUTURE GENERAL

## (undated) DEVICE — SENSOR SPO2 NEO LNCS ADHESIVE (20/BX) SEE USER NOTES

## (undated) DEVICE — SCALP CLIP RANEY 20-1037 (10EA/PK 20PK/CA)

## (undated) DEVICE — STAPLER SKIN DISP - (6/BX 10BX/CA) VISISTAT

## (undated) DEVICE — KIT SURGIFLO W/OUT THROMBIN - (6EA/CA)

## (undated) DEVICE — SET EXTENSION WITH 2 PORTS (48EA/CA) ***PART #2C8610 IS A SUBSTITUTE*****

## (undated) DEVICE — MASK, LARYNGEAL AIRWAY #4

## (undated) DEVICE — SUTURE 4-0 NUROLON CR/8 TF - (12/BX) ETHICON

## (undated) DEVICE — CANISTER SUCTION 3000ML MECHANICAL FILTER AUTO SHUTOFF MEDI-VAC NONSTERILE LF DISP  (40EA/CA)

## (undated) DEVICE — SENSOR OXIMETER ADULT SPO2 RD SET (20EA/BX)

## (undated) DEVICE — CONNECTOR HOSE NEPTUNE FOR CYSTO ROOM

## (undated) DEVICE — CONTAINER SPECIMEN BAG OR - STERILE 4 OZ W/LID (100EA/CA)

## (undated) DEVICE — MASK ANESTHESIA ADULT  - (100/CA)

## (undated) DEVICE — SLEEVE, VASO, THIGH, MED

## (undated) DEVICE — PACK MINOR BASIN - (2EA/CA)

## (undated) DEVICE — TOURNIQUET CUFF 24 X 4 ONE PORT - STERILE (10/BX)

## (undated) DEVICE — SODIUM CHL IRRIGATION 0.9% 1000ML (12EA/CA)

## (undated) DEVICE — BLADE SURGICAL CLIPPER - (50EA/CA)

## (undated) DEVICE — PAD LAP STERILE 18 X 18 - (5/PK 40PK/CA)

## (undated) DEVICE — COVER FOOT UNIVERSAL DISP. - (25EA/CA)

## (undated) DEVICE — SET IRRIGATION CYSTOSCOPY Y-TYPE L81 IN (20EA/CA)

## (undated) DEVICE — DRAPE LAPAROTOMY T SHEET - (12EA/CA)

## (undated) DEVICE — DRESSING 3X8 ADAPTIC GAUZE - NON-ADHERING (36/PK 6PK/BX)

## (undated) DEVICE — BAG DRAINAGE ANTIREFLUX TOWER SLIDE TAP 4000 ML (20EA/CA)

## (undated) DEVICE — DRAIN CSF WITH ANTI REFLUX VALVE

## (undated) DEVICE — CANNULA O2 COMFORT SOFT EAR ADULT 7 FT TUBING (50/CA)

## (undated) DEVICE — GLOVE SZ 7 BIOGEL PI MICRO - PF LF (50PR/BX 4BX/CA)

## (undated) DEVICE — SOD. CHL. INJ. 0.9% 1000 ML - (14EA/CA 60CA/PF)

## (undated) DEVICE — PACK CRANI - (1EA/CA)

## (undated) DEVICE — PACK LOWER EXTREMITY - (2/CA)

## (undated) DEVICE — GOWN SURGEONS X-LARGE - DISP. (30/CA)

## (undated) DEVICE — NEEDLE NON SAFETY 25 GA X 1 1/2 IN HYPO (100EA/BX)

## (undated) DEVICE — GLOVE BIOGEL PI INDICATOR SZ 7.0 SURGICAL PF LF - (50/BX 4BX/CA)

## (undated) DEVICE — TUBING C&T SET FLYING LEADS DRAIN TUBING (10EA/BX)

## (undated) DEVICE — SUTURE 3-0 CHROMIC GUT SH 27 (36PK/BX)"